# Patient Record
Sex: FEMALE | Race: OTHER | Employment: UNEMPLOYED | URBAN - METROPOLITAN AREA
[De-identification: names, ages, dates, MRNs, and addresses within clinical notes are randomized per-mention and may not be internally consistent; named-entity substitution may affect disease eponyms.]

---

## 2017-02-07 ENCOUNTER — OFFICE VISIT (OUTPATIENT)
Dept: FAMILY MEDICINE CLINIC | Age: 62
End: 2017-02-07

## 2017-02-07 VITALS
WEIGHT: 192.8 LBS | HEIGHT: 63 IN | OXYGEN SATURATION: 92 % | TEMPERATURE: 98.1 F | BODY MASS INDEX: 34.16 KG/M2 | HEART RATE: 95 BPM | RESPIRATION RATE: 18 BRPM | DIASTOLIC BLOOD PRESSURE: 74 MMHG | SYSTOLIC BLOOD PRESSURE: 107 MMHG

## 2017-02-07 DIAGNOSIS — E78.5 DYSLIPIDEMIA: ICD-10-CM

## 2017-02-07 DIAGNOSIS — R10.13 EPIGASTRIC ABDOMINAL PAIN: ICD-10-CM

## 2017-02-07 DIAGNOSIS — K59.09 OTHER CONSTIPATION: ICD-10-CM

## 2017-02-07 DIAGNOSIS — F33.9 MAJOR DEPRESSIVE DISORDER, RECURRENT EPISODE WITH ANXIOUS DISTRESS (HCC): ICD-10-CM

## 2017-02-07 DIAGNOSIS — E55.9 HYPOVITAMINOSIS D: ICD-10-CM

## 2017-02-07 DIAGNOSIS — E78.00 HYPERCHOLESTEROLEMIA: ICD-10-CM

## 2017-02-07 DIAGNOSIS — I10 HYPERTENSION, WELL CONTROLLED: ICD-10-CM

## 2017-02-07 DIAGNOSIS — E11.40 DIABETIC NEUROPATHY, PAINFUL (HCC): ICD-10-CM

## 2017-02-07 LAB
GLUCOSE POC: NORMAL MG/DL
HBA1C MFR BLD HPLC: 10.5 %

## 2017-02-07 RX ORDER — LANCETS
EACH MISCELLANEOUS
Qty: 1 EACH | Refills: 11 | Status: SHIPPED | OUTPATIENT
Start: 2017-02-07 | End: 2017-05-03 | Stop reason: SDUPTHER

## 2017-02-07 RX ORDER — PHENOL/SODIUM PHENOLATE
20 AEROSOL, SPRAY (ML) MUCOUS MEMBRANE DAILY
Qty: 30 TAB | Refills: 5 | Status: SHIPPED | OUTPATIENT
Start: 2017-02-07 | End: 2017-05-03 | Stop reason: SDUPTHER

## 2017-02-07 RX ORDER — LISINOPRIL 5 MG/1
5 TABLET ORAL DAILY
Qty: 30 TAB | Refills: 5 | Status: SHIPPED | OUTPATIENT
Start: 2017-02-07 | End: 2017-03-07 | Stop reason: SDUPTHER

## 2017-02-07 RX ORDER — INSULIN ASPART 100 [IU]/ML
INJECTION, SOLUTION INTRAVENOUS; SUBCUTANEOUS
Qty: 3 PEN | Refills: 10 | Status: SHIPPED | OUTPATIENT
Start: 2017-02-07 | End: 2017-05-03 | Stop reason: SDUPTHER

## 2017-02-07 RX ORDER — ERGOCALCIFEROL 1.25 MG/1
50000 CAPSULE ORAL
Qty: 12 CAP | Refills: 3 | Status: SHIPPED | OUTPATIENT
Start: 2017-02-07 | End: 2017-03-07 | Stop reason: SDUPTHER

## 2017-02-07 RX ORDER — METFORMIN HYDROCHLORIDE 1000 MG/1
1000 TABLET ORAL 2 TIMES DAILY WITH MEALS
Qty: 60 TAB | Refills: 7 | Status: SHIPPED | OUTPATIENT
Start: 2017-02-07 | End: 2017-05-03 | Stop reason: SDUPTHER

## 2017-02-07 RX ORDER — BUPROPION HYDROCHLORIDE 150 MG/1
150 TABLET ORAL
Qty: 30 TAB | Refills: 2 | Status: SHIPPED | OUTPATIENT
Start: 2017-02-07 | End: 2017-03-07 | Stop reason: SDUPTHER

## 2017-02-07 RX ORDER — POLYETHYLENE GLYCOL 3350 17 G/17G
17 POWDER, FOR SOLUTION ORAL
Qty: 30 PACKET | Refills: 5 | Status: SHIPPED | OUTPATIENT
Start: 2017-02-09 | End: 2017-12-24 | Stop reason: ALTCHOICE

## 2017-02-07 RX ORDER — GABAPENTIN 100 MG/1
100 CAPSULE ORAL 3 TIMES DAILY
Qty: 90 CAP | Refills: 3 | Status: SHIPPED | OUTPATIENT
Start: 2017-02-07 | End: 2017-03-07 | Stop reason: SDUPTHER

## 2017-02-07 RX ORDER — INSULIN LISPRO 100 [IU]/ML
INJECTION, SOLUTION INTRAVENOUS; SUBCUTANEOUS
Qty: 20 UNITS | Refills: 0
Start: 2017-02-07 | End: 2017-02-07 | Stop reason: ALTCHOICE

## 2017-02-07 NOTE — PATIENT INSTRUCTIONS
Type 2 Diabetes: Care Instructions  Your Care Instructions  Type 2 diabetes is a disease that develops when the body's tissues cannot use insulin properly. Over time, the pancreas cannot make enough insulin. Insulin is a hormone that helps the body's cells use sugar (glucose) for energy. It also helps the body store extra sugar in muscle, fat, and liver cells. Without insulin, the sugar cannot get into the cells to do its work. It stays in the blood instead. This can cause high blood sugar levels. A person has diabetes when the blood sugar stays too high too much of the time. Over time, diabetes can lead to diseases of the heart, blood vessels, nerves, kidneys, and eyes. You may be able to control your blood sugar by losing weight, eating a healthy diet, and getting daily exercise. You may also have to take insulin or other diabetes medicine. Follow-up care is a key part of your treatment and safety. Be sure to make and go to all appointments. Call your doctor if you are having problems. It's also a good idea to know your test results and keep a list of the medicines you take. How can you care for yourself at home? · Keep your blood sugar at a target level (which you set with your doctor). ¨ Eat a good diet that spreads carbohydrate throughout the day. Carbohydrate--the body's main source of fuel--affects blood sugar more than any other nutrient. Carbohydrate is in fruits, vegetables, milk, and yogurt. It also is in breads, cereals, vegetables such as potatoes and corn, and sugary foods such as candy and cakes. ¨ Aim for 30 minutes of exercise on most, preferably all, days of the week. Walking is a good choice. You also may want to do other activities, such as running, swimming, cycling, or playing tennis or team sports. If your doctor says it's okay, do muscle-strengthening exercises at least 2 times a week. ¨ Take your medicines exactly as prescribed.  Call your doctor if you think you are having a problem with your medicine. You will get more details on the specific medicines your doctor prescribes. · Check your blood sugar as often as your doctor recommends. It is important to keep track of any symptoms you have, such as low blood sugar. Also tell your doctor if you have any changes in your activities, diet, or insulin use. · Talk to your doctor before you start taking aspirin every day. Aspirin can help certain people lower their risk of a heart attack or stroke. But taking aspirin isn't right for everyone, because it can cause serious bleeding. · Do not smoke. If you need help quitting, talk to your doctor about stop-smoking programs and medicines. These can increase your chances of quitting for good. · Keep your cholesterol and blood pressure at normal levels. You may need to take one or more medicines to reach your goals. Take them exactly as directed. Do not stop or change a medicine without talking to your doctor first.  When should you call for help? Call 911 anytime you think you may need emergency care. For example, call if:  · You passed out (lost consciousness), or you suddenly become very sleepy or confused. (You may have very low blood sugar.)  Call your doctor now or seek immediate medical care if:  · Your blood sugar is 300 mg/dL or is higher than the level your doctor has set for you. · You have symptoms of low blood sugar, such as:  ¨ Sweating. ¨ Feeling nervous, shaky, and weak. ¨ Extreme hunger and slight nausea. ¨ Dizziness and headache. ¨ Blurred vision. ¨ Confusion. Watch closely for changes in your health, and be sure to contact your doctor if:  · You often have problems controlling your blood sugar. · You have symptoms of long-term diabetes problems, such as:  ¨ New vision changes. ¨ New pain, numbness, or tingling in your hands or feet. ¨ Skin problems. Where can you learn more? Go to http://alley-serg.info/.   Enter C553 in the search box to learn more about \"Type 2 Diabetes: Care Instructions. \"  Current as of: May 23, 2016  Content Version: 11.1  © 2235-1294 [employer]. Care instructions adapted under license by [employer] (which disclaims liability or warranty for this information). If you have questions about a medical condition or this instruction, always ask your healthcare professional. [address] any warranty or liability for your use of this information.

## 2017-02-07 NOTE — PROGRESS NOTES
1. Have you been to the ER, urgent care clinic since your last visit? Hospitalized since your last visit? Yes Where: elevated sugar, [facility]    2. Have you seen or consulted any other health care providers outside of the [address] [place] [address] since your last visit? Include any pap smears or colon screening. No  interperter states patient sugar is elevated and monitor is not reading sugar.

## 2017-02-07 NOTE — MR AVS SNAPSHOT
Visit Information Date & Time Provider Department Dept. Phone Encounter #  
 2/7/2017  3:30 PM Ricci Robertson MD [facility] at [address] [id number] Follow-up Instructions Return 2 weeks, for f/u results. Your Appointments 3/2/2017 10:40 AM  
Any with Marcos Alvarado MD  
[address] [place] ([facility]) Appt Note: f/up to discuss treatment options, last seen 2015  
 [address] [facility], [address] [address] [address] [place] [address], [address] [address] Upcoming Health Maintenance Date Due Pneumococcal 19-64 Medium Risk (1 of 1 - PPSV23) 7/1/1974 DTaP/Tdap/Td series (1 - Tdap) 7/1/1976 FOOT EXAM Q1 6/9/2016 MICROALBUMIN Q1 6/9/2016 EYE EXAM RETINAL OR DILATED Q1 6/9/2016 FOBT Q 1 YEAR AGE 50-75 6/9/2016 HEMOGLOBIN A1C Q6M 2/10/2017 BREAST CANCER SCRN MAMMOGRAM 5/6/2017 LIPID PANEL Q1 3/8/2017 PAP AKA CERVICAL CYTOLOGY 6/9/2018 Allergies as of 2/7/2017  Review Complete On: 2/7/2017 By: Ricci Robertson MD  
 No Known Allergies Current Immunizations  Never Reviewed No immunizations on file. Not reviewed this visit You Were Diagnosed With   
  
 Codes Comments DM type 2, uncontrolled, with neuropathy ([facility] [address]    -  Primary ICD-10-CM: E11.40, E11.65 ICD-9-CM: 250.62, 357.2 Other constipation     ICD-10-CM: K59.09 
ICD-9-CM: 564.09 Hypertension, well controlled     ICD-10-CM: I10 
ICD-9-CM: 401.9 Major depressive disorder, recurrent episode with anxious distress ([address]     ICD-10-CM: F33.9 ICD-9-CM: 296.30 Hypovitaminosis D     ICD-10-CM: E55.9 ICD-9-CM: 268.9 Epigastric abdominal pain     ICD-10-CM: R10.13 ICD-9-CM: 789.06 Diabetic neuropathy, painful ([address]     ICD-10-CM: E11.40 ICD-9-CM: 250.60, 357.2 Dyslipidemia     ICD-10-CM: E78.5 ICD-9-CM: 272.4 Hypercholesterolemia     ICD-10-CM: E78.00 ICD-9-CM: 272.0 Vitals BP Pulse Temp Resp Height(growth percentile) Weight(growth percentile) 107/74 (BP 1 Location: Left arm, BP Patient Position: Sitting) 95 98.1 °F (36.7 °C) (Oral) 18 5' 3\" (1.6 m) 192 lb 12.8 oz (87.5 kg) LMP SpO2 BMI OB Status Smoking Status (LMP Unknown) 92% 34.15 kg/m2 Hysterectomy Current Every Day Smoker Vitals History BMI and BSA Data Body Mass Index Body Surface Area  
 34.15 kg/m 2 1.97 m 2 Preferred Pharmacy Pharmacy Name Phone [id number], [address] [phone] Your Updated Medication List  
  
   
This list is accurate as of: 2/7/17  4:38 PM.  Always use your most recent med list.  
  
  
  
  
 Blood-Glucose Meter monitoring kit Commonly known as:  Freestyle Flash System Use as directed buPROPion  mg tablet Commonly known as:  Diego Medrano Take 1 Tab by mouth every morning. Indications: ANXIETY WITH DEPRESSION  
  
 ergocalciferol 50,000 unit capsule Commonly known as:  ERGOCALCIFEROL Take 1 Cap by mouth every seven (7) days. gabapentin 100 mg capsule Commonly known as:  NEURONTIN Take 1 Cap by mouth three (3) times daily. glucose blood VI test strips strip Commonly known as:  FREESTYLE INSULINX Check sugar 3 times  
  
 insulin aspart 100 unit/mL Inpn Commonly known as:  Andrey Ahr 40 units in am, 15 units in evening with meal  Indications: type 2 diabetes mellitus Lancets Misc Check sugar 2 times a day  
  
 lisinopril 5 mg tablet Commonly known as:  Nonah Henrietta Take 1 Tab by mouth daily. Indications: DIABETIC NEPHROPATHY, hypertension  
  
 metFORMIN 1,000 mg tablet Commonly known as:  GLUCOPHAGE Take 1 Tab by mouth two (2) times daily (with meals). Indications: type 2 diabetes mellitus Omeprazole delayed release 20 mg tablet Commonly known as:  PRILOSEC D/R Take 1 Tab by mouth daily. Indications: GASTROESOPHAGEAL REFLUX polyethylene glycol 17 gram packet Commonly known as:  Bolivar Novak Take 1 Packet by mouth every Monday, Thursday, Saturday. Start taking on:  2017 Prescriptions Sent to Pharmacy Refills  
 polyethylene glycol (MIRALAX) 17 gram packet 5 Starting on: 2017 Sig: Take 1 Packet by mouth every Monday, Thursday, Saturday. Class: Normal  
 Pharmacy: [facility]/pharmacy #[id number] [address] [address] Ph #: [phone] Route: Oral  
 lisinopril (PRINIVIL, ZESTRIL) 5 mg tablet 5 Sig: Take 1 Tab by mouth daily. Indications: DIABETIC NEPHROPATHY, hypertension Class: Normal  
 Pharmacy: [facility]/pharmacy #[id number]- [place] [address] [address] Ph #: [phone] Route: Oral  
 glucose blood VI test strips (FREESTYLE INSULINX) strip 11 Sig: Check sugar 3 times Class: Normal  
 Pharmacy: [facility]/pharmacy #[id number] [address] [address] Ph #: [phone] Lancets misc 11 Sig: Check sugar 2 times a day Class: Normal  
 Pharmacy: [facility]/pharmacy #[id number] [address] [address] Ph #: [phone]  
 buPROPion XL (WELLBUTRIN XL) 150 mg tablet 2 Sig: Take 1 Tab by mouth every morning. Indications: ANXIETY WITH DEPRESSION Class: Normal  
 Pharmacy: [facility]/pharmacy #[id number] [address] [address] Ph #: [phone] Route: Oral  
 insulin aspart (NOVOLOG FLEXPEN) 100 unit/mL inpn 10 Si units in am, 15 units in evening with meal  Indications: type 2 diabetes mellitus  Class: Normal  
 Pharmacy: [facility]/pharmacy #[id number]- [place] [address] [address] Ph #: [phone]  
 ergocalciferol (ERGOCALCIFEROL) 50,000 unit capsule 3  
 Sig: Take 1 Cap by mouth every seven (7) days. Class: Normal  
 Pharmacy: [facility]/pharmacy #[id number] [address] [address] Ph #: [phone] Route: Oral  
 Omeprazole delayed release (PRILOSEC D/R) 20 mg tablet 5 Sig: Take 1 Tab by mouth daily. Indications: GASTROESOPHAGEAL REFLUX Class: Normal  
 Pharmacy: [facility]/pharmacy #[id number] [address] [address] Ph #: [phone] Route: Oral  
 gabapentin (NEURONTIN) 100 mg capsule 3 Sig: Take 1 Cap by mouth three (3) times daily. Class: Normal  
 Pharmacy: [facility]/pharmacy #[id number] [address] [address] #: [phone] Route: Oral  
 metFORMIN (GLUCOPHAGE) 1,000 mg tablet 7 Sig: Take 1 Tab by mouth two (2) times daily (with meals). Indications: type 2 diabetes mellitus Class: Normal  
 Pharmacy: [facility]/pharmacy #[id number] [address] [address] #: [phone] Route: Oral  
  
We Performed the Following AMB POC GLUCOSE BLOOD, BY GLUCOSE MONITORING DEVICE [93613 CPT(R)] AMB POC HEMOGLOBIN A1C [21150 CPT(R)] LIPID PANEL [55663 CPT(R)] METABOLIC PANEL, COMPREHENSIVE [60204 CPT(R)] MICROALBUMIN, UR, RAND W/ MICROALBUMIN/CREA RATIO [id number] CPT(R)] Follow-up Instructions Return 2 weeks, for f/u results. Patient Instructions Type 2 Diabetes: Care Instructions Your Care Instructions Type 2 diabetes is a disease that develops when the body's tissues cannot use insulin properly. Over time, the pancreas cannot make enough insulin. Insulin is a hormone that helps the body's cells use sugar (glucose) for energy. It also helps the body store extra sugar in muscle, fat, and liver cells. Without insulin, the sugar cannot get into the cells to do its work. It stays in the blood instead. This can cause high blood sugar levels.  A person has diabetes when the blood sugar stays too high too much of the time. Over time, diabetes can lead to diseases of the heart, blood vessels, nerves, kidneys, and eyes. You may be able to control your blood sugar by losing weight, eating a healthy diet, and getting daily exercise. You may also have to take insulin or other diabetes medicine. Follow-up care is a key part of your treatment and safety. Be sure to make and go to all appointments. Call your doctor if you are having problems. It's also a good idea to know your test results and keep a list of the medicines you take. How can you care for yourself at home? · Keep your blood sugar at a target level (which you set with your doctor). ¨ Eat a good diet that spreads carbohydrate throughout the day. Carbohydratethe body's main source of fuelaffects blood sugar more than any other nutrient. Carbohydrate is in fruits, vegetables, milk, and yogurt. It also is in breads, cereals, vegetables such as potatoes and corn, and sugary foods such as candy and cakes. ¨ Aim for 30 minutes of exercise on most, preferably all, days of the week. Walking is a good choice. You also may want to do other activities, such as running, swimming, cycling, or playing tennis or team sports. If your doctor says it's okay, do muscle-strengthening exercises at least 2 times a week. ¨ Take your medicines exactly as prescribed. Call your doctor if you think you are having a problem with your medicine. You will get more details on the specific medicines your doctor prescribes. · Check your blood sugar as often as your doctor recommends. It is important to keep track of any symptoms you have, such as low blood sugar. Also tell your doctor if you have any changes in your activities, diet, or insulin use. · Talk to your doctor before you start taking aspirin every day. Aspirin can help certain people lower their risk of a heart attack or stroke.  But taking aspirin isn't right for everyone, because it can cause serious bleeding. · Do not smoke. If you need help quitting, talk to your doctor about stop-smoking programs and medicines. These can increase your chances of quitting for good. · Keep your cholesterol and blood pressure at normal levels. You may need to take one or more medicines to reach your goals. Take them exactly as directed. Do not stop or change a medicine without talking to your doctor first. 
When should you call for help? Call 911 anytime you think you may need emergency care. For example, call if: 
· You passed out (lost consciousness), or you suddenly become very sleepy or confused. (You may have very low blood sugar.) Call your doctor now or seek immediate medical care if: 
· Your blood sugar is 300 mg/dL or is higher than the level your doctor has set for you. · You have symptoms of low blood sugar, such as: ¨ Sweating. ¨ Feeling nervous, shaky, and weak. ¨ Extreme hunger and slight nausea. ¨ Dizziness and headache. ¨ Blurred vision. ¨ Confusion. Watch closely for changes in your health, and be sure to contact your doctor if: 
· You often have problems controlling your blood sugar. · You have symptoms of long-term diabetes problems, such as: ¨ New vision changes. ¨ New pain, numbness, or tingling in your hands or feet. ¨ Skin problems. Where can you learn more? Go to http://alley-serg.info/. Enter C553 in the search box to learn more about \"Type 2 Diabetes: Care Instructions. \" Current as of: May 23, 2016 Content Version: 11.1 © 2388-9943 [employer]. Care instructions adapted under license by [employer] (which disclaims liability or warranty for this information). If you have questions about a medical condition or this instruction, always ask your healthcare professional. [address] any warranty or liability for your use of this information. Introducing [facility]! Angelina Cummings introduces [employer] patient portal. Now you can access parts of your medical record, email your doctor's office, and request medication refills online. 1. In your internet browser, go to [email or web address] 2. Click on the First Time User? Click Here link in the Sign In box. You will see the New Member Sign Up page. 3. Enter your [employer] Access Code exactly as it appears below. You will not need to use this code after youve completed the sign-up process. If you do not sign up before the expiration date, you must request a new code. · [employer] Access Code: [email or web address] Expires: 5/8/2017  4:38 PM 
 
4. Enter the last four digits of your Social Security Number (xxxx) and Date of Birth (mm/dd/yyyy) as indicated and click Submit. You will be taken to the next sign-up page. 5. Create a Femasys ID. This will be your [employer] login ID and cannot be changed, so think of one that is secure and easy to remember. 6. Create a [employer] password. You can change your password at any time. 7. Enter your Password Reset Question and Answer. This can be used at a later time if you forget your password. 8. Enter your e-mail address. You will receive e-mail notification when new information is available in [address] 9. Click Sign Up. You can now view and download portions of your medical record. 10. Click the Download Summary menu link to download a portable copy of your medical information. If you have questions, please visit the Frequently Asked Questions section of the [employer] website. Remember, [employer] is NOT to be used for urgent needs. For medical emergencies, dial 911. Now available from your iPhone and Android! Please provide this summary of care documentation to your next provider. Your primary care clinician is listed as Juany Stark. If you have any questions after today's visit, please call [phone].

## 2017-02-07 NOTE — PROGRESS NOTES
Chief Complaint   Patient presents with    Diabetes    Medication Refill     HPI:  Maggy Campos is a 64 y.o. AA female presenting for diabetes and hypertension presenting for medication r.efill  Diabetes is always not controlled, A1c=10%, blood sugar reads high. She awake, alert and asymptomatic. Patient has not taken insulin for few weeks    Review of Systems  As per hpi  Past Medical History   Diagnosis Date    Arthritis      shoulders and knees.  Diabetes ([address]      insulin dependent     Hypercholesterolemia     Hypertension      Past Surgical History   Procedure Laterality Date    Hx gyn       hysterectomy     Social History    Marital status:      Spouse name: N/A    Number of children: N/A    Years of education: N/A     Social History Main Topics    Smoking status: Current Every Day Smoker     Packs/day: 4.00     Years: 3.00    Smokeless tobacco: Never Used    Alcohol use No    Drug use: No    Sexual activity: Not Asked     Current Outpatient Prescriptions   Medication Sig Dispense Refill    buPROPion XL (WELLBUTRIN XL) 150 mg tablet Take 1 Tab by mouth every morning. Indications: ANXIETY WITH DEPRESSION 30 Tab 2    ergocalciferol (ERGOCALCIFEROL) 50,000 unit capsule Take 1 Cap by mouth every seven (7) days. 12 Cap 3    insulin aspart (NOVOLOG FLEXPEN) 100 unit/mL inpn 30 units in am, 10 units in evening with meal  Indications: TYPE 2 DIABETES MELLITUS 3 Pen 10    mupirocin (BACTROBAN) 2 % ointment Apply  to affected area daily. 22 g 0    insulin lispro (HUMALOG) 100 unit/mL injection 20 units now 20 Units 0    Omeprazole delayed release (PRILOSEC D/R) 20 mg tablet Take 1 Tab by mouth daily. Indications: GASTROESOPHAGEAL REFLUX 30 Tab 5    gabapentin (NEURONTIN) 100 mg capsule Take 1 Cap by mouth three (3) times daily.  90 Cap 3    Lancets (FREESTYLE LANCETS) misc Check sugar 2 times a day 1 Each 11    glucose blood VI test strips (FREESTYLE INSULINX) strip Check sugar 3 times 90 Strip 11    insulin aspart protamine/insulin aspart (NOVOLOG MIX 70/30) 100 unit/mL (70-30) inpn by SubCUTAneous route.  metFORMIN (GLUCOPHAGE) 1,000 mg tablet Take 1 Tab by mouth two (2) times daily (with meals). Indications: TYPE 2 DIABETES MELLITUS 60 Tab 7    polyethylene glycol (MIRALAX) 17 gram packet Take 1 Packet by mouth every Monday, Thursday, Saturday. 30 Packet 5    lisinopril (PRINIVIL, ZESTRIL) 5 mg tablet Take 1 Tab by mouth daily.  Indications: DIABETIC NEPHROPATHY, HYPERTENSION 30 Tab 5    Blood-Glucose Meter (VidimaxSTYLE FLASH SYSTEM) monitoring kit Use as directed 1 Kit 0     No Known Allergies    Objective:  Visit Vitals    /74 (BP 1 Location: Left arm, BP Patient Position: Sitting)    Pulse 95    Temp 98.1 °F (36.7 °C) (Oral)    Resp 18    Ht 5' 3\" (1.6 m)    Wt 192 lb 12.8 oz (87.5 kg)    LMP  (LMP Unknown)    SpO2 92%    BMI 34.15 kg/m2     Physical Exam:   General appearance - alert, well appearing, in no distress  Mental status - alert, oriented to person, place, and time  EYE-PERRL, EOMI  Heart - normal rate, regular rhythm, stable blood pressure  Abdomen - soft, nontender, nondistended, no organomegaly  Ext-peripheral pulses normal, no pedal edema, no clubbing or cyanosis  Neuro -alert, oriented, normal speech, no focal findings    Results for orders placed or performed in visit on 02/07/17   AMB POC GLUCOSE BLOOD, BY GLUCOSE MONITORING DEVICE   Result Value Ref Range    Glucose POC HHH mg/dL   AMB POC HEMOGLOBIN A1C   Result Value Ref Range    Hemoglobin A1c (POC) 10.5 %     Assessment/Plan:    ICD-10-CM ICD-9-CM    1. DM type 2, uncontrolled, with neuropathy (HCC) E11.40 250.62 AMB POC GLUCOSE BLOOD, BY GLUCOSE MONITORING DEVICE    E11.65 357.2 AMB POC HEMOGLOBIN A1C      Lancets misc      metFORMIN (GLUCOPHAGE) 1,000 mg tablet      METABOLIC PANEL, COMPREHENSIVE      MICROALBUMIN, UR, RAND W/ MICROALBUMIN/CREA RATIO      REFERRAL TO ENDOCRINOLOGY   2. Other constipation K59.09 564.09 polyethylene glycol (MIRALAX) 17 gram packet   3. Hypertension, well controlled I10 401.9 lisinopril (PRINIVIL, ZESTRIL) 5 mg tablet      METABOLIC PANEL, COMPREHENSIVE   4. Major depressive disorder, recurrent episode with anxious distress (HCC) F33.9 296.30 buPROPion XL (WELLBUTRIN XL) 150 mg tablet   5. Hypovitaminosis D E55.9 268.9 ergocalciferol (ERGOCALCIFEROL) 50,000 unit capsule   6. Epigastric abdominal pain R10.13 789.06 Omeprazole delayed release (PRILOSEC D/R) 20 mg tablet   7. Diabetic neuropathy, painful (HCC) E11.40 250.60 gabapentin (NEURONTIN) 100 mg capsule     357.2    8. Dyslipidemia E78.5 272.4    9. Hypercholesterolemia E78.00 272.0 LIPID PANEL     Patient Instructions        Type 2 Diabetes: Care Instructions  Your Care Instructions  Type 2 diabetes is a disease that develops when the body's tissues cannot use insulin properly. Over time, the pancreas cannot make enough insulin. Insulin is a hormone that helps the body's cells use sugar (glucose) for energy. It also helps the body store extra sugar in muscle, fat, and liver cells. Without insulin, the sugar cannot get into the cells to do its work. It stays in the blood instead. This can cause high blood sugar levels. A person has diabetes when the blood sugar stays too high too much of the time. Over time, diabetes can lead to diseases of the heart, blood vessels, nerves, kidneys, and eyes. You may be able to control your blood sugar by losing weight, eating a healthy diet, and getting daily exercise. You may also have to take insulin or other diabetes medicine. Follow-up care is a key part of your treatment and safety. Be sure to make and go to all appointments. Call your doctor if you are having problems. It's also a good idea to know your test results and keep a list of the medicines you take. How can you care for yourself at home?   · Keep your blood sugar at a target level (which you set with your doctor). ¨ Eat a good diet that spreads carbohydrate throughout the day. Carbohydrate--the body's main source of fuel--affects blood sugar more than any other nutrient. Carbohydrate is in fruits, vegetables, milk, and yogurt. It also is in breads, cereals, vegetables such as potatoes and corn, and sugary foods such as candy and cakes. ¨ Aim for 30 minutes of exercise on most, preferably all, days of the week. Walking is a good choice. You also may want to do other activities, such as running, swimming, cycling, or playing tennis or team sports. If your doctor says it's okay, do muscle-strengthening exercises at least 2 times a week. ¨ Take your medicines exactly as prescribed. Call your doctor if you think you are having a problem with your medicine. You will get more details on the specific medicines your doctor prescribes. · Check your blood sugar as often as your doctor recommends. It is important to keep track of any symptoms you have, such as low blood sugar. Also tell your doctor if you have any changes in your activities, diet, or insulin use. · Talk to your doctor before you start taking aspirin every day. Aspirin can help certain people lower their risk of a heart attack or stroke. But taking aspirin isn't right for everyone, because it can cause serious bleeding. · Do not smoke. If you need help quitting, talk to your doctor about stop-smoking programs and medicines. These can increase your chances of quitting for good. · Keep your cholesterol and blood pressure at normal levels. You may need to take one or more medicines to reach your goals. Take them exactly as directed. Do not stop or change a medicine without talking to your doctor first.  When should you call for help? Call 911 anytime you think you may need emergency care. For example, call if:  · You passed out (lost consciousness), or you suddenly become very sleepy or confused.  (You may have very low blood sugar.)  Call your doctor now or seek immediate medical care if:  · Your blood sugar is 300 mg/dL or is higher than the level your doctor has set for you. · You have symptoms of low blood sugar, such as:  ¨ Sweating. ¨ Feeling nervous, shaky, and weak. ¨ Extreme hunger and slight nausea. ¨ Dizziness and headache. ¨ Blurred vision. ¨ Confusion. Watch closely for changes in your health, and be sure to contact your doctor if:  · You often have problems controlling your blood sugar. · You have symptoms of long-term diabetes problems, such as:  ¨ New vision changes. ¨ New pain, numbness, or tingling in your hands or feet. ¨ Skin problems. Where can you learn more? Go to http://alley-serg.info/. Enter C553 in the search box to learn more about \"Type 2 Diabetes: Care Instructions. \"  Current as of: May 23, 2016  Content Version: 11.1  © 6935-8590 [employer]. Care instructions adapted under license by [employer] (which disclaims liability or warranty for this information). If you have questions about a medical condition or this instruction, always ask your healthcare professional. [place] [address] any warranty or liability for your use of this information. Follow-up Disposition:  Return 2 weeks, for f/u results.

## 2017-02-16 LAB
ALBUMIN SERPL-MCNC: 4.1 G/DL (ref 3.6–4.8)
ALBUMIN/CREAT UR: 4.8 MG/G CREAT (ref 0–30)
ALBUMIN/GLOB SERPL: 1.5 {RATIO} (ref 1.1–2.5)
ALP SERPL-CCNC: 80 IU/L (ref 39–117)
ALT SERPL-CCNC: 16 IU/L (ref 0–32)
AST SERPL-CCNC: 15 IU/L (ref 0–40)
BILIRUB SERPL-MCNC: 0.5 MG/DL (ref 0–1.2)
BUN SERPL-MCNC: 15 MG/DL (ref 8–27)
BUN/CREAT SERPL: 20 (ref 11–26)
CALCIUM SERPL-MCNC: 9.5 MG/DL (ref 8.7–10.3)
CHLORIDE SERPL-SCNC: 96 MMOL/L (ref 96–106)
CHOLEST SERPL-MCNC: 220 MG/DL (ref 100–199)
CO2 SERPL-SCNC: 26 MMOL/L (ref 18–29)
CREAT SERPL-MCNC: 0.74 MG/DL (ref 0.57–1)
CREAT UR-MCNC: 135.8 MG/DL
GLOBULIN SER CALC-MCNC: 2.8 G/DL (ref 1.5–4.5)
GLUCOSE SERPL-MCNC: 347 MG/DL (ref 65–99)
HDLC SERPL-MCNC: 45 MG/DL
LDLC SERPL CALC-MCNC: 123 MG/DL (ref 0–99)
MICROALBUMIN UR-MCNC: 6.5 UG/ML
POTASSIUM SERPL-SCNC: 5 MMOL/L (ref 3.5–5.2)
PROT SERPL-MCNC: 6.9 G/DL (ref 6–8.5)
SODIUM SERPL-SCNC: 137 MMOL/L (ref 134–144)
TRIGL SERPL-MCNC: 258 MG/DL (ref 0–149)
VLDLC SERPL CALC-MCNC: 52 MG/DL (ref 5–40)

## 2017-02-20 ENCOUNTER — OFFICE VISIT (OUTPATIENT)
Dept: FAMILY MEDICINE CLINIC | Age: 62
End: 2017-02-20

## 2017-02-20 VITALS
HEART RATE: 100 BPM | SYSTOLIC BLOOD PRESSURE: 103 MMHG | RESPIRATION RATE: 20 BRPM | DIASTOLIC BLOOD PRESSURE: 64 MMHG | WEIGHT: 193 LBS | TEMPERATURE: 97.4 F | HEIGHT: 63 IN | BODY MASS INDEX: 34.2 KG/M2 | OXYGEN SATURATION: 97 %

## 2017-02-20 DIAGNOSIS — E11.00 UNCONTROLLED TYPE 2 DIABETES MELLITUS WITH HYPEROSMOLARITY WITHOUT COMA, UNSPECIFIED LONG TERM INSULIN USE STATUS: ICD-10-CM

## 2017-02-20 DIAGNOSIS — E78.00 HYPERCHOLESTEROLEMIA: ICD-10-CM

## 2017-02-20 DIAGNOSIS — E66.9 OBESITY (BMI 30-39.9): ICD-10-CM

## 2017-02-20 DIAGNOSIS — E55.9 HYPOVITAMINOSIS D: Primary | ICD-10-CM

## 2017-02-20 NOTE — MR AVS SNAPSHOT
Visit Information Date & Time Provider Department Dept. Phone Encounter #  
 2/20/2017  3:00 PM Nanci Celestin MD [facility] at [address] [id number] Follow-up Instructions Return in about 4 weeks (around 3/20/2017), or if symptoms worsen or fail to improve. Your Appointments 3/2/2017 10:40 AM  
Any with Lola Jeronimo MD  
[address] [place] [address] ([facility]) Appt Note: f/up to discuss treatment options, last seen 2015  
 [address] [facility] [address] [phone] [address] [place] [address]., [address] [phone]  
  
    
 3/21/2017  3:20 PM  
New Patient with Venancio Beach MD  
[facility] OFF[facility] ([facility]) Appt Note: new pt; manage diabetes [address] [place] [address] 7 [phone] [place] [phone] Upcoming Health Maintenance Date Due Pneumococcal 19-64 Medium Risk (1 of 1 - PPSV23) 7/1/1974 DTaP/Tdap/Td series (1 - Tdap) 7/1/1976 FOOT EXAM Q1 6/9/2016 EYE EXAM RETINAL OR DILATED Q1 6/9/2016 FOBT Q 1 YEAR AGE 50-75 6/9/2016 BREAST CANCER SCRN MAMMOGRAM 5/6/2017 HEMOGLOBIN A1C Q6M 8/7/2017 MICROALBUMIN Q1 2/15/2018 LIPID PANEL Q1 2/15/2018 PAP AKA CERVICAL CYTOLOGY 6/9/2018 Allergies as of 2/20/2017  Review Complete On: 2/20/2017 By: Nanci Celestin MD  
 No Known Allergies Current Immunizations  Never Reviewed No immunizations on file. Not reviewed this visit You Were Diagnosed With   
  
 Codes Comments Hypovitaminosis D    -  Primary ICD-10-CM: E55.9 ICD-9-CM: 268.9 Hypercholesterolemia     ICD-10-CM: E78.00 ICD-9-CM: 272.0 Uncontrolled type 2 diabetes mellitus with hyperosmolarity without coma, unspecified long term insulin use status (HCC)     ICD-10-CM: E11.00 ICD-9-CM: 250.22   
 Obesity (BMI 30-39. 9)     ICD-10-CM: E66.9 ICD-9-CM: 278.00 Vitals BP Pulse Temp Resp Height(growth percentile) Weight(growth percentile) 103/64 (BP 1 Location: Right arm, BP Patient Position: Sitting) 100 97.4 °F (36.3 °C) (Oral) 20 5' 3\" (1.6 m) 193 lb (87.5 kg) LMP SpO2 BMI OB Status Smoking Status (LMP Unknown) 97% 34.19 kg/m2 Hysterectomy Current Every Day Smoker Vitals History BMI and BSA Data Body Mass Index Body Surface Area  
 34.19 kg/m 2 1.97 m 2 Preferred Pharmacy Pharmacy Name Phone [id number] [id number], [phone] [phone] Your Updated Medication List  
  
   
This list is accurate as of: 2/20/17  4:32 PM.  Always use your most recent med list.  
  
  
  
  
 Blood-Glucose Meter monitoring kit Commonly known as:  Freestyle Flash System Use as directed buPROPion  mg tablet Commonly known as:  Oanh Bannister Take 1 Tab by mouth every morning. Indications: ANXIETY WITH DEPRESSION  
  
 ergocalciferol 50,000 unit capsule Commonly known as:  ERGOCALCIFEROL Take 1 Cap by mouth every seven (7) days. gabapentin 100 mg capsule Commonly known as:  NEURONTIN Take 1 Cap by mouth three (3) times daily. glucose blood VI test strips strip Commonly known as:  FREESTYLE INSULINX Check sugar 3 times  
  
 insulin aspart 100 unit/mL Inpn Commonly known as:  Julee Barnacle 40 units in am, 15 units in evening with meal  Indications: type 2 diabetes mellitus Lancets Misc Check sugar 2 times a day  
  
 lisinopril 5 mg tablet Commonly known as:  Kenneth Pares Take 1 Tab by mouth daily. Indications: DIABETIC NEPHROPATHY, hypertension  
  
 metFORMIN 1,000 mg tablet Commonly known as:  GLUCOPHAGE Take 1 Tab by mouth two (2) times daily (with meals). Indications: type 2 diabetes mellitus Omeprazole delayed release 20 mg tablet Commonly known as:  PRILOSEC D/R Take 1 Tab by mouth daily. Indications: GASTROESOPHAGEAL REFLUX polyethylene glycol 17 gram packet Commonly known as:  Johnathan John Take 1 Packet by mouth every Monday, Thursday, Saturday. We Performed the Following AMB POC GLUCOSE BLOOD, BY GLUCOSE MONITORING DEVICE [67475 CPT(R)] Follow-up Instructions Return in about 4 weeks (around 3/20/2017), or if symptoms worsen or fail to improve. Patient Instructions Advised to go to ER for evaluation and some fluid, Sugar is ruperto high Introducing \[facility]\"" & HEALTH SERVICES! [facility] introduces [employer] patient portal. Now you can access parts of your medical record, email your doctor's office, and request medication refills online. 1. In your internet browser, go to [email or web address] 2. Click on the First Time User? Click Here link in the Sign In box. You will see the New Member Sign Up page. 3. Enter your Priceline Access Code exactly as it appears below. You will not need to use this code after youve completed the sign-up process. If you do not sign up before the expiration date, you must request a new code. · Priceline Access Code: [email or web address] Expires: 5/8/2017  4:38 PM 
 
4. Enter the last four digits of your Social Security Number (xxxx) and Date of Birth (mm/dd/yyyy) as indicated and click Submit. You will be taken to the next sign-up page. 5. Create a [employer] ID. This will be your Priceline login ID and cannot be changed, so think of one that is secure and easy to remember. 6. Create a Priceline password. You can change your password at any time. 7. Enter your Password Reset Question and Answer. This can be used at a later time if you forget your password. 8. Enter your e-mail address. You will receive e-mail notification when new information is available in [address] 9. Click Sign Up. You can now view and download portions of your medical record. 10. Click the Download Summary menu link to download a portable copy of your medical information. If you have questions, please visit the Frequently Asked Questions section of the [employer] website. Remember, [employer] is NOT to be used for urgent needs. For medical emergencies, dial 911. Now available from your iPhone and Android! Please provide this summary of care documentation to your next provider. Your primary care clinician is listed as Esme Norman. If you have any questions after today's visit, please call [phone].

## 2017-02-20 NOTE — PROGRESS NOTES
1. Have you been to the ER, urgent care clinic since your last visit? Hospitalized since your last visit? No    2. Have you seen or consulted any other health care providers outside of the [address] [place] [address] since your last visit? Include any pap smears or colon screening. No     Pt is here for lab results.

## 2017-02-20 NOTE — PROGRESS NOTES
Chief Complaint   Patient presents with    Results     HPI:  Rubens Cortez is a 64 y.o. female presenting to follow up diabetes. Current medications include Nolog 40 unit AM and 15 units in PM; metformin 1g 2 times aday. Patient is non compliant with treatment, A1C=10, BS clinic today reads [address] About 4 weeks ago the reading was also [address]. Patient is alert and awake. Advised to go to ER for some IV fluid and insulin    Review of Systems  As per    Past Medical History   Diagnosis Date    Arthritis      shoulders and knees.  Diabetes ([address]      insulin dependent     Hypercholesterolemia     Hypertension      Past Surgical History   Procedure Laterality Date    Hx gyn       hysterectomy     Social History     Social History    Marital status:      Spouse name: N/A    Number of children: N/A    Years of education: N/A     Social History Main Topics    Smoking status: Current Every Day Smoker     Packs/day: 4.00     Years: 3.00    Smokeless tobacco: Never Used    Alcohol use No    Drug use: No    Sexual activity: Not Asked     Current Outpatient Prescriptions   Medication Sig Dispense Refill    polyethylene glycol (MIRALAX) 17 gram packet Take 1 Packet by mouth every Monday, Thursday, Saturday. 30 Packet 5    lisinopril (PRINIVIL, ZESTRIL) 5 mg tablet Take 1 Tab by mouth daily. Indications: DIABETIC NEPHROPATHY, hypertension 30 Tab 5    glucose blood VI test strips (FREESTYLE INSULINX) strip Check sugar 3 times 90 Strip 11    Lancets misc Check sugar 2 times a day 1 Each 11    buPROPion XL (WELLBUTRIN XL) 150 mg tablet Take 1 Tab by mouth every morning. Indications: ANXIETY WITH DEPRESSION 30 Tab 2    insulin aspart (NOVOLOG FLEXPEN) 100 unit/mL inpn 40 units in am, 15 units in evening with meal  Indications: type 2 diabetes mellitus 3 Pen 10    ergocalciferol (ERGOCALCIFEROL) 50,000 unit capsule Take 1 Cap by mouth every seven (7) days.  12 Cap 3    Omeprazole delayed release (PRILOSEC D/R) 20 mg tablet Take 1 Tab by mouth daily. Indications: GASTROESOPHAGEAL REFLUX 30 Tab 5    gabapentin (NEURONTIN) 100 mg capsule Take 1 Cap by mouth three (3) times daily. 90 Cap 3    metFORMIN (GLUCOPHAGE) 1,000 mg tablet Take 1 Tab by mouth two (2) times daily (with meals). Indications: type 2 diabetes mellitus 60 Tab 7    Blood-Glucose Meter (FREESTYLE FLASH SYSTEM) monitoring kit Use as directed 1 Kit 0     No Known Allergies    Objective:  Visit Vitals    /64 (BP 1 Location: Right arm, BP Patient Position: Sitting)    Pulse 100    Temp 97.4 °F (36.3 °C) (Oral)    Resp 20    Ht 5' 3\" (1.6 m)    Wt 193 lb (87.5 kg)    LMP  (LMP Unknown)    SpO2 97%    BMI 34.19 kg/m2     Physical Exam:   General appearance - alert, well appearing, in no distress  Mental status - alert, oriented to person, place, and time  EYE-PERRL, EOMI  Chest - clear to auscultation, no wheezes, rales or rhonchi  Heart - normal rate, regular rhythm, normal blood pressure  Abdomen - soft, nontender, nondistended, no organomegaly  Ext-peripheral pulses normal, no pedal edema  Neuro -alert, oriented, normal speech, no focal findings   Back-full range of motion, no tenderness, palpable spasm or pain on motion     Results for orders placed or performed in visit on 36/36/87   METABOLIC PANEL, COMPREHENSIVE   Result Value Ref Range    Glucose 347 (H) 65 - 99 mg/dL    BUN 15 8 - 27 mg/dL    Creatinine 0.74 0.57 - 1.00 mg/dL    GFR est non-AA 88 >59 mL/min/1.73    GFR est  >59 mL/min/1.73    BUN/Creatinine ratio 20 11 - 26    Sodium 137 134 - 144 mmol/L    Potassium 5.0 3.5 - 5.2 mmol/L    Chloride 96 96 - 106 mmol/L    CO2 26 18 - 29 mmol/L    Calcium 9.5 8.7 - 10.3 mg/dL    Protein, total 6.9 6.0 - 8.5 g/dL    Albumin 4.1 3.6 - 4.8 g/dL    GLOBULIN, TOTAL 2.8 1.5 - 4.5 g/dL    A-G Ratio 1.5 1.1 - 2.5    Bilirubin, total 0.5 0.0 - 1.2 mg/dL    Alk.  phosphatase 80 39 - 117 IU/L    AST (SGOT) 15 0 - 40 IU/L    ALT (SGPT) 16 0 - 32 IU/L   LIPID PANEL   Result Value Ref Range    Cholesterol, total 220 (H) 100 - 199 mg/dL    Triglyceride 258 (H) 0 - 149 mg/dL    HDL Cholesterol 45 >39 mg/dL    VLDL, calculated 52 (H) 5 - 40 mg/dL    LDL, calculated 123 (H) 0 - 99 mg/dL   MICROALBUMIN, UR, RAND W/ MICROALBUMIN/CREA RATIO   Result Value Ref Range    Creatinine, urine 135.8 Not Estab. mg/dL    Microalbumin, urine 6.5 Not Estab. ug/mL    Microalb/Creat ratio (ug/mg creat.) 4.8 0.0 - 30.0 mg/g creat   AMB POC GLUCOSE BLOOD, BY GLUCOSE MONITORING DEVICE   Result Value Ref Range    Glucose POC HHH mg/dL   AMB POC HEMOGLOBIN A1C   Result Value Ref Range    Hemoglobin A1c (POC) 10.5 %     Assessment/Plan:    ICD-10-CM ICD-9-CM    1. Hypovitaminosis D E55.9 268.9    2. Hypercholesterolemia E78.00 272.0    3. Uncontrolled type 2 diabetes mellitus with hyperosmolarity without coma, unspecified long term insulin use status (HCC) E11.00 250.22 AMB POC GLUCOSE BLOOD, BY GLUCOSE MONITORING DEVICE   4. Obesity (BMI 30-39. 9) E66.9 278.00        Patient Instructions   Advised to go to ER for evaluation and some fluid, Sugar is ruperto high    Follow-up Disposition:  Return in about 4 weeks (around 3/20/2017), or if symptoms worsen or fail to improve.

## 2017-03-02 ENCOUNTER — OFFICE VISIT (OUTPATIENT)
Dept: SLEEP MEDICINE | Age: 62
End: 2017-03-02

## 2017-03-02 VITALS
SYSTOLIC BLOOD PRESSURE: 108 MMHG | HEIGHT: 63 IN | DIASTOLIC BLOOD PRESSURE: 74 MMHG | HEART RATE: 77 BPM | BODY MASS INDEX: 34.38 KG/M2 | WEIGHT: 194 LBS | OXYGEN SATURATION: 98 %

## 2017-03-02 DIAGNOSIS — G47.33 OBSTRUCTIVE SLEEP APNEA (ADULT) (PEDIATRIC): Primary | ICD-10-CM

## 2017-03-02 NOTE — PATIENT INSTRUCTIONS
[address] [place]. [place], [address]  Tel.  [phone]  Fax. 100 [facility] 60  [place], [address] [facility]  Tel.  [phone]  Fax. [phone] [address] [address] Erik Patel  Tel.  [phone]  Fax. [phone]     Learning About CPAP for Sleep Apnea  What is CPAP? CPAP is a small machine that you use at home every night while you sleep. It increases air pressure in your throat to keep your airway open. When you have sleep apnea, this can help you sleep better so you feel much better. CPAP stands for \"continuous positive airway pressure. \"  The CPAP machine will have one of the following:  · A mask that covers your nose and mouth  · Prongs that fit into your nose  · A mask that covers your nose only, the most common type. This type is called NCPAP. The N stands for \"nasal.\"  Why is it done? CPAP is usually the best treatment for obstructive sleep apnea. It is the first treatment choice and the most widely used. Your doctor may suggest CPAP if you have:  · Moderate to severe sleep apnea. · Sleep apnea and coronary artery disease (CAD) or heart failure. How does it help? · CPAP can help you have more normal sleep, so you feel less sleepy and more alert during the daytime. · CPAP may help keep heart failure or other heart problems from getting worse. · NCPAP may help lower your blood pressure. · If you use CPAP, your bed partner may also sleep better because you are not snoring or restless. What are the side effects? Some people who use CPAP have:  · A dry or stuffy nose and a sore throat. · Irritated skin on the face. · Sore eyes. · Bloating. If you have any of these problems, work with your doctor to fix them. Here are some things you can try:  · Be sure the mask or nasal prongs fit well. · See if your doctor can adjust the pressure of your CPAP. · If your nose is dry, try a humidifier.   · If your nose is runny or stuffy, try decongestant medicine or a steroid nasal spray. If these things do not help, you might try a different type of machine. Some machines have air pressure that adjusts on its own. Others have air pressures that are different when you breathe in than when you breathe out. This may reduce discomfort caused by too much pressure in your nose. Where can you learn more? Go to [employer].be  Enter Abena Crain in the search box to learn more about \"Learning About CPAP for Sleep Apnea. \"   © 1610-5210 Healthwise, Incorporated. Care instructions adapted under license by [facility] [employer] (which disclaims liability or warranty for this information). This care instruction is for use with your licensed healthcare professional. If you have questions about a medical condition or this instruction, always ask your healthcare professional. [address] any warranty or liability for your use of this information. Content Version: 9.0.50939; Last Revised: January 11, 2010  PROPER SLEEP HYGIENE    What to avoid  · Do not have drinks with caffeine, such as coffee or black tea, for 8 hours before bed. · Do not smoke or use other types of tobacco near bedtime. Nicotine is a stimulant and can keep you awake. · Avoid drinking alcohol late in the evening, because it can cause you to wake in the middle of the night. · Do not eat a big meal close to bedtime. If you are hungry, eat a light snack. · Do not drink a lot of water close to bedtime, because the need to urinate may wake you up during the night. · Do not read or watch TV in bed. Use the bed only for sleeping and sexual activity. What to try  · Go to bed at the same time every night, and wake up at the same time every morning. Do not take naps during the day. · Keep your bedroom quiet, dark, and cool. · Get regular exercise, but not within 3 to 4 hours of your bedtime. .  · Sleep on a comfortable pillow and mattress.   · If watching the clock makes you anxious, turn it facing away from you so you cannot see the time. · If you worry when you lie down, start a worry book. Well before bedtime, write down your worries, and then set the book and your concerns aside. · Try meditation or other relaxation techniques before you go to bed. · If you cannot fall asleep, get up and go to another room until you feel sleepy. Do something relaxing. Repeat your bedtime routine before you go to bed again. · Make your house quiet and calm about an hour before bedtime. Turn down the lights, turn off the TV, log off the computer, and turn down the volume on music. This can help you relax after a busy day. Drowsy Driving: The [employer] cites drowsiness as a causing factor in more than 028,377 police reported crashes annually, resulting in 76,000 injuries and 1,500 deaths. Other surveys suggest 55% of people polled have driven while drowsy in the past year, 23% had fallen asleep but not crashed, 3% crashed, and 2% had and accident due to drowsy driving. Who is at risk? Young Drivers: One study of drowsy driving accidents states that 55% of the drivers were under 25 years. Of those, 75% were male. Shift Workers and Travelers: People who work overnight or travel across time zones frequently are at higher risk of experiencing Circadian Rhythm Disorders. They are trying to work and function when their body is programed to sleep. Sleep Deprived: Lack of sleep has a serious impact on your ability to pay attention or focus on a task. Consistently getting less than the average of 8 hours your body needs creates partial or cumulative sleep deprivation. Untreated Sleep Disorders: Sleep Apnea, Narcolepsy, R.L.S., and other sleep disorders (untreated) prevent a person from getting enough restful sleep. This leads to excessive daytime sleepiness and increases the risk for drowsy driving accidents by up to 7 times.   Medications / Alcohol: Even over the counter medications can cause drowsiness. Medications that impair a drivers attention should have a warning label. Alcohol naturally makes you sleepy and on its own can cause accidents. Combined with excessive drowsiness its effects are amplified. Signs of Drowsy Driving:   * You don't remember driving the last few miles   * You may drift out of your mckay   * You are unable to focus and your thoughts wander   * You may yawn more often than normal   * You have difficulty keeping your eyes open / nodding off   * Missing traffic signs, speeding, or tailgating  Prevention-   Good sleep hygiene, lifestyle and behavioral choices have the most impact on drowsy driving. There is no substitute for sleep and the average person requires 8 hours nightly. If you find yourself driving drowsy, stop and sleep. Consider the sleep hygiene tips provided during your visit as well. Medication Refill Policy: Refills for all medications require 1 week advance notice. Please have your pharmacy fax a refill request. We are unable to fax, or call in \"controled substance\" medications and you will need to pick these prescriptions up from our office. Collabera Activation    Thank you for requesting access to [employer]era. Please follow the instructions below to securely access and download your online medical record. Collabera allows you to send messages to your doctor, view your test results, renew your prescriptions, schedule appointments, and more. How Do I Sign Up? 1. In your internet browser, go to [email or web address]. 2. Click on the First Time User? Click Here link in the Sign In box. You will see the New Member Sign Up page. 3. Enter your Collabera Access Code exactly as it appears below. You will not need to use this code after youve completed the sign-up process. If you do not sign up before the expiration date, you must request a new code.     Collabera Access Code: [email or web address]  Expires: 2017  4:38 PM (This is the date your AboutOne access code will )    4. Enter the last four digits of your Social Security Number (xxxx) and Date of Birth (mm/dd/yyyy) as indicated and click Submit. You will be taken to the next sign-up page. 5. Create a AboutOne ID. This will be your AboutOne login ID and cannot be changed, so think of one that is secure and easy to remember. 6. Create a AboutOne password. You can change your password at any time. 7. Enter your Password Reset Question and Answer. This can be used at a later time if you forget your password. 8. Enter your e-mail address. You will receive e-mail notification when new information is available in [address] 9. Click Sign Up. You can now view and download portions of your medical record. 10. Click the Download Summary menu link to download a portable copy of your medical information. Additional Information    If you have questions, please call [phone]. Remember, AboutOne is NOT to be used for urgent needs. For medical emergencies, dial 911.

## 2017-03-02 NOTE — PROGRESS NOTES
[address] [facility] [place]. [place], [address]  Tel.  [phone]  Fax. 100 [facility] 60  [place], [address] [facility]  Tel.  [phone]  Fax. [phone] [address] [place] [address] Erik Patel   Tel.  [phone]  Fax. [phone]     S>Virgilio Glasgow is a 64 y.o. female seen for Sleep Problem (f/up to discuss treatment options- blue phone  in [address] [place] [address]  number [id number])    She was previously diagnosed with sleep apnea. We ordered a CPAP but she says she never received it. She would like to proceed with PAP therapy. She also describes difficulty sleeping. She stays up at night to talk to her son in [place] ([facility]). She drinks coffee at night and smokes. She is upset and sad that her son was denied a visa to come to the [place] in [place]. She is very worried about him. No Known Allergies    She has a current medication list which includes the following prescription(s): polyethylene glycol, lisinopril, glucose blood vi test strips, lancets, bupropion xl, insulin aspart, ergocalciferol, omeprazole delayed release, gabapentin, metformin, and blood-glucose meter. .      She  has a past medical history of Arthritis; Diabetes ([address] Hypercholesterolemia; and Hypertension. [place] Sleepiness Score: 6   and Modified F.O.S.Q. Score Total / 2: 12.5      O>    Visit Vitals    /74    Pulse 77    Ht 5' 3\" (1.6 m)    Wt 194 lb (88 kg)    LMP  (LMP Unknown)    SpO2 98%    BMI 34.37 kg/m2           General:   Alert, oriented, not in distress   Neck:   No JVD    Chest/Lungs:  symetrical lung expansion , no accessory muscle use    Extremities:  no obvious rashes , negative edema    Neuro:  No focal deficits ; No obvious tremor    Psych:  Normal affect ,  Normal countenance ;       A>    ICD-10-CM ICD-9-CM    1. Obstructive sleep apnea (adult) (pediatric) G47.33 327.23 AMB SUPPLY ORDER           P>    Treatment options were reviewed in detail.  she would like to proceed with PAP therapy. I have placed an order for APAP. I advised her to have PAP set up done in our office so  services will be available. she will be seen in follow-up in 6 weeks to gauge treatment response and adherence to therapy. All of her questions were addressed with the assistance of a . 2. Depression - in 2015, we sent her to a psychiatrist but hasnt seen him lately. I advised for her to discuss her stressors with her PMD and psychiatrist.   Proper sleep hygiene  reviewed today and all of her questions were addressed. Office visit exceeded 25 minutes with counseling and direction of care taking up more than 50% of the allotted time.         Karen Ann MD  Diplomate in Sleep Medicine  [facility]

## 2017-03-07 ENCOUNTER — HOSPITAL ENCOUNTER (OUTPATIENT)
Dept: GENERAL RADIOLOGY | Age: 62
Discharge: HOME | End: 2017-03-07
Payer: MEDICAID

## 2017-03-07 ENCOUNTER — OFFICE VISIT (OUTPATIENT)
Dept: FAMILY MEDICINE CLINIC | Age: 62
End: 2017-03-07

## 2017-03-07 VITALS
OXYGEN SATURATION: 94 % | BODY MASS INDEX: 45.13 KG/M2 | DIASTOLIC BLOOD PRESSURE: 67 MMHG | SYSTOLIC BLOOD PRESSURE: 113 MMHG | TEMPERATURE: 97 F | WEIGHT: 195 LBS | HEIGHT: 55 IN | HEART RATE: 84 BPM | RESPIRATION RATE: 20 BRPM

## 2017-03-07 DIAGNOSIS — E78.2 MIXED HYPERCHOLESTEROLEMIA AND HYPERTRIGLYCERIDEMIA: ICD-10-CM

## 2017-03-07 DIAGNOSIS — F33.9 MAJOR DEPRESSIVE DISORDER, RECURRENT EPISODE WITH ANXIOUS DISTRESS (HCC): ICD-10-CM

## 2017-03-07 DIAGNOSIS — E11.40 DIABETIC NEUROPATHY, PAINFUL (HCC): ICD-10-CM

## 2017-03-07 DIAGNOSIS — M79.606 LUMBAR PAIN WITH RADIATION DOWN LEG: ICD-10-CM

## 2017-03-07 DIAGNOSIS — E55.9 HYPOVITAMINOSIS D: ICD-10-CM

## 2017-03-07 DIAGNOSIS — I10 HYPERTENSION, WELL CONTROLLED: ICD-10-CM

## 2017-03-07 DIAGNOSIS — M54.50 LUMBAR PAIN WITH RADIATION DOWN LEG: ICD-10-CM

## 2017-03-07 DIAGNOSIS — J30.81 ALLERGIC RHINITIS DUE TO ANIMAL HAIR AND DANDER, UNSPECIFIED RHINITIS SEASONALITY: ICD-10-CM

## 2017-03-07 LAB — GLUCOSE POC: 208 MG/DL

## 2017-03-07 PROCEDURE — 72100 X-RAY EXAM L-S SPINE 2/3 VWS: CPT

## 2017-03-07 RX ORDER — LISINOPRIL 5 MG/1
5 TABLET ORAL DAILY
Qty: 30 TAB | Refills: 5 | Status: SHIPPED | OUTPATIENT
Start: 2017-03-07 | End: 2017-05-03 | Stop reason: SDUPTHER

## 2017-03-07 RX ORDER — LORATADINE 10 MG/1
10 TABLET ORAL
Qty: 30 TAB | Refills: 5 | Status: SHIPPED | OUTPATIENT
Start: 2017-03-07 | End: 2017-05-03 | Stop reason: SDUPTHER

## 2017-03-07 RX ORDER — ERGOCALCIFEROL 1.25 MG/1
50000 CAPSULE ORAL
Qty: 12 CAP | Refills: 3 | Status: SHIPPED | OUTPATIENT
Start: 2017-03-07 | End: 2017-05-03 | Stop reason: SDUPTHER

## 2017-03-07 RX ORDER — GABAPENTIN 100 MG/1
100 CAPSULE ORAL 3 TIMES DAILY
Qty: 90 CAP | Refills: 3 | Status: SHIPPED | OUTPATIENT
Start: 2017-03-07 | End: 2017-05-03 | Stop reason: SDUPTHER

## 2017-03-07 RX ORDER — LOVASTATIN 20 MG/1
20 TABLET ORAL
Qty: 30 TAB | Refills: 5 | Status: SHIPPED | OUTPATIENT
Start: 2017-03-07 | End: 2017-05-03 | Stop reason: SDUPTHER

## 2017-03-07 RX ORDER — BUPROPION HYDROCHLORIDE 150 MG/1
150 TABLET ORAL
Qty: 30 TAB | Refills: 5 | Status: SHIPPED | OUTPATIENT
Start: 2017-03-07 | End: 2017-05-03 | Stop reason: SDUPTHER

## 2017-03-07 NOTE — PROGRESS NOTES
Chief Complaint   Patient presents with    Diabetes     HPI:  Arianna Kirkpatrick is a 64 y.o. female presenting for 2 weeks follow up diabetes. Current medications include Nolog 40 unit AM and 15 units in PM; metformin 1g 2 times aday. She has been taking treatment, A1C=10, BS today has improved indicating that she has been taking medication. About 4 weeks prior the readings have very high. Also, she has additional complaints of lower back pain that radiates to legs bilaterally for months. Patient has not been able to walk. Review of Systems  As per hpi    Past Medical History:   Diagnosis Date    Arthritis     shoulders and knees.  Diabetes ([address]     insulin dependent     Hypercholesterolemia     Hypertension      Past Surgical History:   Procedure Laterality Date    HX GYN      hysterectomy     Social History     Social History    Marital status:      Spouse name: N/A    Number of children: N/A    Years of education: N/A     Social History Main Topics    Smoking status: Current Every Day Smoker     Packs/day: 2.00     Years: 3.00    Smokeless tobacco: Never Used    Alcohol use No    Drug use: No    Sexual activity: Not Asked     Current Outpatient Prescriptions   Medication Sig Dispense Refill    polyethylene glycol (MIRALAX) 17 gram packet Take 1 Packet by mouth every Monday, Thursday, Saturday. 30 Packet 5    lisinopril (PRINIVIL, ZESTRIL) 5 mg tablet Take 1 Tab by mouth daily. Indications: DIABETIC NEPHROPATHY, hypertension 30 Tab 5    glucose blood VI test strips (FREESTYLE INSULINX) strip Check sugar 3 times 90 Strip 11    Lancets misc Check sugar 2 times a day 1 Each 11    buPROPion XL (WELLBUTRIN XL) 150 mg tablet Take 1 Tab by mouth every morning.  Indications: ANXIETY WITH DEPRESSION 30 Tab 2    insulin aspart (NOVOLOG FLEXPEN) 100 unit/mL inpn 40 units in am, 15 units in evening with meal  Indications: type 2 diabetes mellitus 3 Pen 10    ergocalciferol (ERGOCALCIFEROL) 50,000 unit capsule Take 1 Cap by mouth every seven (7) days. 12 Cap 3    Omeprazole delayed release (PRILOSEC D/R) 20 mg tablet Take 1 Tab by mouth daily. Indications: GASTROESOPHAGEAL REFLUX 30 Tab 5    gabapentin (NEURONTIN) 100 mg capsule Take 1 Cap by mouth three (3) times daily. 90 Cap 3    metFORMIN (GLUCOPHAGE) 1,000 mg tablet Take 1 Tab by mouth two (2) times daily (with meals).  Indications: type 2 diabetes mellitus 60 Tab 7    Blood-Glucose Meter (FREESTYLE FLASH SYSTEM) monitoring kit Use as directed 1 Kit 0     No Known Allergies    Objective:  Visit Vitals    /67 (BP 1 Location: Right arm, BP Patient Position: Sitting)    Pulse 84    Temp 97 °F (36.1 °C) (Oral)    Resp 20    Ht 4' 5\" (1.346 m)    Wt 195 lb (88.5 kg)    LMP  (LMP Unknown)    SpO2 94%    BMI 48.81 kg/m2     Physical Exam:   General appearance - alert, well appearing, in no distress  Mental status - alert, oriented to person, place, and time  EYE-PERRL, EOMI  Neck - supple, no significant adenopathy   Chest - clear to auscultation, no wheezes, rales or rhonchi  Heart - normal rate, regular rhythm, normal blood pressure  Abdomen - soft, nontender, nondistended, no organomegaly  Ext-peripheral pulses normal, no pedal edema  Neuro -alert, oriented, normal speech, no focal findings  Back-antalgic gait, limited range of motion, pain with motion noted during exam     Results for orders placed or performed in visit on 69/19/80   METABOLIC PANEL, COMPREHENSIVE   Result Value Ref Range    Glucose 347 (H) 65 - 99 mg/dL    BUN 15 8 - 27 mg/dL    Creatinine 0.74 0.57 - 1.00 mg/dL    GFR est non-AA 88 >59 mL/min/1.73    GFR est  >59 mL/min/1.73    BUN/Creatinine ratio 20 11 - 26    Sodium 137 134 - 144 mmol/L    Potassium 5.0 3.5 - 5.2 mmol/L    Chloride 96 96 - 106 mmol/L    CO2 26 18 - 29 mmol/L    Calcium 9.5 8.7 - 10.3 mg/dL    Protein, total 6.9 6.0 - 8.5 g/dL    Albumin 4.1 3.6 - 4.8 g/dL    GLOBULIN, TOTAL 2.8 1.5 - 4.5 g/dL    A-G Ratio 1.5 1.1 - 2.5    Bilirubin, total 0.5 0.0 - 1.2 mg/dL    Alk. phosphatase 80 39 - 117 IU/L    AST (SGOT) 15 0 - 40 IU/L    ALT (SGPT) 16 0 - 32 IU/L   LIPID PANEL   Result Value Ref Range    Cholesterol, total 220 (H) 100 - 199 mg/dL    Triglyceride 258 (H) 0 - 149 mg/dL    HDL Cholesterol 45 >39 mg/dL    VLDL, calculated 52 (H) 5 - 40 mg/dL    LDL, calculated 123 (H) 0 - 99 mg/dL   MICROALBUMIN, UR, RAND W/ MICROALBUMIN/CREA RATIO   Result Value Ref Range    Creatinine, urine 135.8 Not Estab. mg/dL    Microalbumin, urine 6.5 Not Estab. ug/mL    Microalb/Creat ratio (ug/mg creat.) 4.8 0.0 - 30.0 mg/g creat   AMB POC GLUCOSE BLOOD, BY GLUCOSE MONITORING DEVICE   Result Value Ref Range    Glucose POC HHH mg/dL   AMB POC HEMOGLOBIN A1C   Result Value Ref Range    Hemoglobin A1c (POC) 10.5 %     Recent Results (from the past 12 hour(s))   AMB POC GLUCOSE BLOOD, BY GLUCOSE MONITORING DEVICE    Collection Time: 03/07/17  3:43 PM   Result Value Ref Range    Glucose  mg/dL     Assessment/Plan:    ICD-10-CM ICD-9-CM    1. DM type 2, uncontrolled, with neuropathy (HCC) E11.40 250.62 AMB POC GLUCOSE BLOOD, BY GLUCOSE MONITORING DEVICE    E11.65 357.2    2. Mixed hypercholesterolemia and hypertriglyceridemia E78.2 272.2 lovastatin (MEVACOR) 20 mg tablet   3. Hypovitaminosis D E55.9 268.9 ergocalciferol (ERGOCALCIFEROL) 50,000 unit capsule   4. Lumbar pain with radiation down leg M54.5 724.2 XR SPINE LUMB 2 OR 3 V      gabapentin (NEURONTIN) 100 mg capsule   5. Major depressive disorder, recurrent episode with anxious distress (HCC) F33.9 296.30 buPROPion XL (WELLBUTRIN XL) 150 mg tablet   6. Diabetic neuropathy, painful (HCC) E11.40 250.60 gabapentin (NEURONTIN) 100 mg capsule     357.2    7. Hypertension, well controlled I10 401.9 lisinopril (PRINIVIL, ZESTRIL) 5 mg tablet   8.  Allergic rhinitis due to animal hair and dander, unspecified rhinitis seasonality J30.81 477.2 loratadine (CLARITIN) 10 mg tablet     Patient Instructions        Back Pain: Care Instructions  Your Care Instructions    Back pain has many possible causes. It is often related to problems with muscles and ligaments of the back. It may also be related to problems with the nerves, discs, or bones of the back. Moving, lifting, standing, sitting, or sleeping in an awkward way can strain the back. Sometimes you don't notice the injury until later. Arthritis is another common cause of back pain. Although it may hurt a lot, back pain usually improves on its own within several weeks. Most people recover in 12 weeks or less. Using good home treatment and being careful not to stress your back can help you feel better sooner. Follow-up care is a key part of your treatment and safety. Be sure to make and go to all appointments, and call your doctor if you are having problems. Its also a good idea to know your test results and keep a list of the medicines you take. How can you care for yourself at home? · Sit or lie in positions that are most comfortable and reduce your pain. Try one of these positions when you lie down:  ¨ Lie on your back with your knees bent and supported by large pillows. ¨ Lie on the floor with your legs on the seat of a sofa or chair. [place] on your side with your knees and hips bent and a pillow between your legs. ¨ Lie on your stomach if it does not make pain worse. · Do not sit up in bed, and avoid soft couches and twisted positions. Bed rest can help relieve pain at first, but it delays healing. Avoid bed rest after the first day of back pain. · Change positions every 30 minutes. If you must sit for long periods of time, take breaks from sitting. Get up and walk around, or lie in a comfortable position. · Try using a heating pad on a low or medium setting for 15 to 20 minutes every 2 or 3 hours. Try a warm shower in place of one session with the heating pad.   · You can also try an ice pack for 10 to 15 minutes every 2 to 3 hours. Put a thin cloth between the ice pack and your skin. · Take pain medicines exactly as directed. ¨ If the doctor gave you a prescription medicine for pain, take it as prescribed. ¨ If you are not taking a prescription pain medicine, ask your doctor if you can take an over-the-counter medicine. · Take short walks several times a day. You can start with 5 to 10 minutes, 3 or 4 times a day, and work up to longer walks. Walk on level surfaces and avoid hills and stairs until your back is better. · Return to work and other activities as soon as you can. Continued rest without activity is usually not good for your back. · To prevent future back pain, do exercises to stretch and strengthen your back and stomach. Learn how to use good posture, safe lifting techniques, and proper body mechanics. When should you call for help? Call your doctor now or seek immediate medical care if:  · You have new or worsening numbness in your legs. · You have new or worsening weakness in your legs. (This could make it hard to stand up.)  · You lose control of your bladder or bowels. Watch closely for changes in your health, and be sure to contact your doctor if:  · Your pain gets worse. · You are not getting better after 2 weeks. Where can you learn more? Go to http://alley-serg.info/. Enter S407 in the search box to learn more about \"Back Pain: Care Instructions. \"  Current as of: May 23, 2016  Content Version: 11.1  © 1424-1754 [email or web address], Incorporated. Care instructions adapted under license by [employer] (which disclaims liability or warranty for this information). If you have questions about a medical condition or this instruction, always ask your healthcare professional. [place] [address] any warranty or liability for your use of this information.       Follow-up Disposition:  Return in about 2 months (around 5/7/2017) for routine follow up.

## 2017-03-07 NOTE — MR AVS SNAPSHOT
Visit Information Date & Time Provider Department Dept. Phone Encounter #  
 3/7/2017  3:00 PM Ricci Robertson MD [facility] at [address] [id number] Follow-up Instructions Return in about 2 months (around 5/7/2017) for routine follow up. Your Appointments 3/21/2017  3:20 PM  
New Patient with Ben Hoyos MD  
[facility] OFFICE-ANNEX ([facility]) Appt Note: new pt; manage diabetes [address] [facility] [phone] [address] [phone] Upcoming Health Maintenance Date Due Pneumococcal 19-64 Medium Risk (1 of 1 - PPSV23) 7/1/1974 DTaP/Tdap/Td series (1 - Tdap) 7/1/1976 FOOT EXAM Q1 6/9/2016 EYE EXAM RETINAL OR DILATED Q1 6/9/2016 FOBT Q 1 YEAR AGE 50-75 6/9/2016 BREAST CANCER SCRN MAMMOGRAM 5/6/2017 HEMOGLOBIN A1C Q6M 8/7/2017 MICROALBUMIN Q1 2/15/2018 LIPID PANEL Q1 2/15/2018 PAP AKA CERVICAL CYTOLOGY 6/9/2018 Allergies as of 3/7/2017  Review Complete On: 3/7/2017 By: Ricci Robertson MD  
 No Known Allergies Current Immunizations  Never Reviewed No immunizations on file. Not reviewed this visit You Were Diagnosed With   
  
 Codes Comments DM type 2, uncontrolled, with neuropathy ([facility] [address]    -  Primary ICD-10-CM: E11.40, E11.65 ICD-9-CM: 250.62, 357.2 Mixed hypercholesterolemia and hypertriglyceridemia     ICD-10-CM: E78.2 ICD-9-CM: 272.2 Hypovitaminosis D     ICD-10-CM: E55.9 ICD-9-CM: 268.9 Lumbar pain with radiation down leg     ICD-10-CM: M54.5 ICD-9-CM: 724.2 Major depressive disorder, recurrent episode with anxious distress ([facility] [address]     ICD-10-CM: F33.9 ICD-9-CM: 296.30 Diabetic neuropathy, painful [facility]     ICD-10-CM: E11.40 ICD-9-CM: 250.60, 357.2 Hypertension, well controlled     ICD-10-CM: I10 
ICD-9-CM: 401.9 Allergic rhinitis due to animal hair and dander, unspecified rhinitis seasonality     ICD-10-CM: J30.81 ICD-9-CM: 477.2 Vitals BP Pulse Temp Resp Height(growth percentile) Weight(growth percentile) 113/67 (BP 1 Location: Right arm, BP Patient Position: Sitting) 84 97 °F (36.1 °C) (Oral) 20 4' 5\" (1.346 m) 195 lb (88.5 kg) LMP SpO2 BMI OB Status Smoking Status (LMP Unknown) 94% 48.81 kg/m2 Hysterectomy Current Every Day Smoker Vitals History BMI and BSA Data Body Mass Index Body Surface Area  
 48.81 kg/m 2 1.82 m 2 Preferred Pharmacy Pharmacy Name Phone [facility]/PHARMACY #[id number]- [place], [address] [phone] Your Updated Medication List  
  
   
This list is accurate as of: 3/7/17  4:09 PM.  Always use your most recent med list.  
  
  
  
  
 Blood-Glucose Meter monitoring kit Commonly known as:  Freestyle Flash System Use as directed buPROPion  mg tablet Commonly known as:  Larcarlos Banangelater Take 1 Tab by mouth every morning. Indications: ANXIETY WITH DEPRESSION  
  
 ergocalciferol 50,000 unit capsule Commonly known as:  ERGOCALCIFEROL Take 1 Cap by mouth every seven (7) days. gabapentin 100 mg capsule Commonly known as:  NEURONTIN Take 1 Cap by mouth three (3) times daily. glucose blood VI test strips strip Commonly known as:  FREESTYLE INSULINX Check sugar 3 times  
  
 insulin aspart 100 unit/mL Inpn Commonly known as:  Ariel [place] 40 units in am, 15 units in evening with meal  Indications: type 2 diabetes mellitus Lancets Misc Check sugar 2 times a day  
  
 lisinopril 5 mg tablet Commonly known as:  Chari Lovington Take 1 Tab by mouth daily. Indications: DIABETIC NEPHROPATHY, hypertension  
  
 loratadine 10 mg tablet Commonly known as:  Kin Riki Take 1 Tab by mouth daily as needed for Allergies or Itching. lovastatin 20 mg tablet Commonly known as:  MEVACOR Take 1 Tab by mouth nightly. Indications: mixed hyperlipidemia  
  
 metFORMIN 1,000 mg tablet Commonly known as:  GLUCOPHAGE Take 1 Tab by mouth two (2) times daily (with meals). Indications: type 2 diabetes mellitus Omeprazole delayed release 20 mg tablet Commonly known as:  PRILOSEC D/R Take 1 Tab by mouth daily. Indications: GASTROESOPHAGEAL REFLUX polyethylene glycol 17 gram packet Commonly known as:  Pricemond Kishore Take 1 Packet by mouth every Monday, Thursday, Saturday. Prescriptions Sent to Pharmacy Refills  
 lovastatin (MEVACOR) 20 mg tablet 5 Sig: Take 1 Tab by mouth nightly. Indications: mixed hyperlipidemia Class: Normal  
 Pharmacy: [facility]/pharmacy #[id number]- [place], [employer] Ph #: [phone] Route: Oral  
 buPROPion XL (WELLBUTRIN XL) 150 mg tablet 5 Sig: Take 1 Tab by mouth every morning. Indications: ANXIETY WITH DEPRESSION Class: Normal  
 Pharmacy: [facility]/pharmacy #[id number] [employer] Ph #: [phone] Route: Oral  
 gabapentin (NEURONTIN) 100 mg capsule 3 Sig: Take 1 Cap by mouth three (3) times daily. Class: Normal  
 Pharmacy: [facility]/pharmacy #[id number]- [facility] [employer] Ph #: [phone] Route: Oral  
 lisinopril (PRINIVIL, ZESTRIL) 5 mg tablet 5 Sig: Take 1 Tab by mouth daily. Indications: DIABETIC NEPHROPATHY, hypertension Class: Normal  
 Pharmacy: [facility]/pharmacy #[id number] [employer] Ph #: [phone] Route: Oral  
 ergocalciferol (ERGOCALCIFEROL) 50,000 unit capsule 3 Sig: Take 1 Cap by mouth every seven (7) days. Class: Normal  
 Pharmacy: [facility]/pharmacy #[id number]- [facility] [employer] Ph #: [phone]  Route: Oral  
 loratadine (CLARITIN) 10 mg tablet 5 Sig: Take 1 Tab by mouth daily as needed for Allergies or Itching. Class: Normal  
 Pharmacy: [facility]/pharmacy #[id number]- [place], [address] [place] [address] #: [phone] Route: Oral  
  
We Performed the Following AMB POC GLUCOSE BLOOD, BY GLUCOSE MONITORING DEVICE [31311 CPT(R)] Follow-up Instructions Return in about 2 months (around 5/7/2017) for routine follow up. To-Do List   
 03/07/2017 Imaging:  XR SPINE LUMB 2 OR 3 V Introducing [facility]! Domenico Marion introduces [employer]ver patient portal. Now you can access parts of your medical record, email your doctor's office, and request medication refills online. 1. In your internet browser, go to [email or web address] Take the Interview/Bablic 2. Click on the First Time User? Click Here link in the Sign In box. You will see the New Member Sign Up page. 3. Enter your Forever Access Code exactly as it appears below. You will not need to use this code after youve completed the sign-up process. If you do not sign up before the expiration date, you must request a new code. · Forever Access Code: [email or web address] Expires: 5/8/2017  4:38 PM 
 
4. Enter the last four digits of your Social Security Number (xxxx) and Date of Birth (mm/dd/yyyy) as indicated and click Submit. You will be taken to the next sign-up page. 5. Create a Tickadet ID. This will be your Forever login ID and cannot be changed, so think of one that is secure and easy to remember. 6. Create a Forever password. You can change your password at any time. 7. Enter your Password Reset Question and Answer. This can be used at a later time if you forget your password. 8. Enter your e-mail address. You will receive e-mail notification when new information is available in [address]. 9. Click Sign Up. You can now view and download portions of your medical record. 10. Click the Download Summary menu link to download a portable copy of your medical information. If you have questions, please visit the Frequently Asked Questions section of the [employer] website. Remember, [employer] is NOT to be used for urgent needs. For medical emergencies, dial 911. Now available from your iPhone and Android! Please provide this summary of care documentation to your next provider. Your primary care clinician is listed as Belgica Rivera. If you have any questions after today's visit, please call [phone].

## 2017-03-07 NOTE — PROGRESS NOTES
1. Have you been to the ER, urgent care clinic since your last visit? Hospitalized since your last visit? No    2. Have you seen or consulted any other health care providers outside of the [address] [place] [address] since your last visit? Include any pap smears or colon screening.  No     Pt is here for follow up

## 2017-03-09 NOTE — PATIENT INSTRUCTIONS
Back Pain: Care Instructions  Your Care Instructions    Back pain has many possible causes. It is often related to problems with muscles and ligaments of the back. It may also be related to problems with the nerves, discs, or bones of the back. Moving, lifting, standing, sitting, or sleeping in an awkward way can strain the back. Sometimes you don't notice the injury until later. Arthritis is another common cause of back pain. Although it may hurt a lot, back pain usually improves on its own within several weeks. Most people recover in 12 weeks or less. Using good home treatment and being careful not to stress your back can help you feel better sooner. Follow-up care is a key part of your treatment and safety. Be sure to make and go to all appointments, and call your doctor if you are having problems. Its also a good idea to know your test results and keep a list of the medicines you take. How can you care for yourself at home? · Sit or lie in positions that are most comfortable and reduce your pain. Try one of these positions when you lie down:  ¨ Lie on your back with your knees bent and supported by large pillows. ¨ Lie on the floor with your legs on the seat of a sofa or chair. Matilde Gerardo on your side with your knees and hips bent and a pillow between your legs. ¨ Lie on your stomach if it does not make pain worse. · Do not sit up in bed, and avoid soft couches and twisted positions. Bed rest can help relieve pain at first, but it delays healing. Avoid bed rest after the first day of back pain. · Change positions every 30 minutes. If you must sit for long periods of time, take breaks from sitting. Get up and walk around, or lie in a comfortable position. · Try using a heating pad on a low or medium setting for 15 to 20 minutes every 2 or 3 hours. Try a warm shower in place of one session with the heating pad. · You can also try an ice pack for 10 to 15 minutes every 2 to 3 hours.  Put a thin cloth between the ice pack and your skin. · Take pain medicines exactly as directed. ¨ If the doctor gave you a prescription medicine for pain, take it as prescribed. ¨ If you are not taking a prescription pain medicine, ask your doctor if you can take an over-the-counter medicine. · Take short walks several times a day. You can start with 5 to 10 minutes, 3 or 4 times a day, and work up to longer walks. Walk on level surfaces and avoid hills and stairs until your back is better. · Return to work and other activities as soon as you can. Continued rest without activity is usually not good for your back. · To prevent future back pain, do exercises to stretch and strengthen your back and stomach. Learn how to use good posture, safe lifting techniques, and proper body mechanics. When should you call for help? Call your doctor now or seek immediate medical care if:  · You have new or worsening numbness in your legs. · You have new or worsening weakness in your legs. (This could make it hard to stand up.)  · You lose control of your bladder or bowels. Watch closely for changes in your health, and be sure to contact your doctor if:  · Your pain gets worse. · You are not getting better after 2 weeks. Where can you learn more? Go to http://alley-serg.info/. Enter U098 in the search box to learn more about \"Back Pain: Care Instructions. \"  Current as of: May 23, 2016  Content Version: 11.1  © 4913-8721 [employer]. Care instructions adapted under license by [employer] (which disclaims liability or warranty for this information). If you have questions about a medical condition or this instruction, always ask your healthcare professional. [address] any warranty or liability for your use of this information.

## 2017-03-22 ENCOUNTER — DOCUMENTATION ONLY (OUTPATIENT)
Dept: SLEEP MEDICINE | Age: 62
End: 2017-03-22

## 2017-05-03 ENCOUNTER — OFFICE VISIT (OUTPATIENT)
Dept: FAMILY MEDICINE CLINIC | Age: 62
End: 2017-05-03

## 2017-05-03 VITALS
TEMPERATURE: 97.1 F | WEIGHT: 191.7 LBS | OXYGEN SATURATION: 98 % | RESPIRATION RATE: 20 BRPM | SYSTOLIC BLOOD PRESSURE: 98 MMHG | DIASTOLIC BLOOD PRESSURE: 60 MMHG | HEART RATE: 81 BPM | BODY MASS INDEX: 33.96 KG/M2 | HEIGHT: 63 IN

## 2017-05-03 DIAGNOSIS — Z78.9 DECREASED ACTIVITIES OF DAILY LIVING (ADL): ICD-10-CM

## 2017-05-03 DIAGNOSIS — R21 RASH: ICD-10-CM

## 2017-05-03 DIAGNOSIS — E11.40 DIABETIC NEUROPATHY, PAINFUL (HCC): ICD-10-CM

## 2017-05-03 DIAGNOSIS — M54.50 LUMBAR PAIN WITH RADIATION DOWN LEG: ICD-10-CM

## 2017-05-03 DIAGNOSIS — E55.9 HYPOVITAMINOSIS D: ICD-10-CM

## 2017-05-03 DIAGNOSIS — M79.606 LUMBAR PAIN WITH RADIATION DOWN LEG: ICD-10-CM

## 2017-05-03 DIAGNOSIS — E78.2 MIXED HYPERCHOLESTEROLEMIA AND HYPERTRIGLYCERIDEMIA: ICD-10-CM

## 2017-05-03 DIAGNOSIS — F33.9 MAJOR DEPRESSIVE DISORDER, RECURRENT EPISODE WITH ANXIOUS DISTRESS (HCC): ICD-10-CM

## 2017-05-03 DIAGNOSIS — R10.13 EPIGASTRIC ABDOMINAL PAIN: ICD-10-CM

## 2017-05-03 DIAGNOSIS — M47.26 OSTEOARTHRITIS OF SPINE WITH RADICULOPATHY, LUMBAR REGION: ICD-10-CM

## 2017-05-03 DIAGNOSIS — I10 HYPERTENSION, WELL CONTROLLED: ICD-10-CM

## 2017-05-03 LAB
GLUCOSE POC: 300 MG/DL
HBA1C MFR BLD HPLC: 10 %

## 2017-05-03 RX ORDER — METFORMIN HYDROCHLORIDE 1000 MG/1
1000 TABLET ORAL 2 TIMES DAILY WITH MEALS
Qty: 60 TAB | Refills: 7 | Status: SHIPPED | OUTPATIENT
Start: 2017-05-03 | End: 2017-07-20 | Stop reason: SDUPTHER

## 2017-05-03 RX ORDER — LORATADINE 10 MG/1
10 TABLET ORAL
Qty: 30 TAB | Refills: 5 | Status: SHIPPED | OUTPATIENT
Start: 2017-05-03 | End: 2017-12-20 | Stop reason: ALTCHOICE

## 2017-05-03 RX ORDER — BUPROPION HYDROCHLORIDE 150 MG/1
150 TABLET ORAL
Qty: 30 TAB | Refills: 5 | Status: SHIPPED | OUTPATIENT
Start: 2017-05-03 | End: 2017-12-20 | Stop reason: SDUPTHER

## 2017-05-03 RX ORDER — PHENOL/SODIUM PHENOLATE
20 AEROSOL, SPRAY (ML) MUCOUS MEMBRANE DAILY
Qty: 30 TAB | Refills: 5 | Status: SHIPPED | OUTPATIENT
Start: 2017-05-03 | End: 2017-12-20 | Stop reason: ALTCHOICE

## 2017-05-03 RX ORDER — LOVASTATIN 20 MG/1
20 TABLET ORAL
Qty: 30 TAB | Refills: 5 | Status: SHIPPED | OUTPATIENT
Start: 2017-05-03 | End: 2017-12-24 | Stop reason: ALTCHOICE

## 2017-05-03 RX ORDER — INSULIN PUMP SYRINGE, 3 ML
EACH MISCELLANEOUS
Qty: 1 KIT | Refills: 0 | Status: SHIPPED | OUTPATIENT
Start: 2017-05-03 | End: 2017-12-20 | Stop reason: SDUPTHER

## 2017-05-03 RX ORDER — INSULIN ASPART 100 [IU]/ML
INJECTION, SOLUTION INTRAVENOUS; SUBCUTANEOUS
Qty: 3 PEN | Refills: 10 | Status: SHIPPED | OUTPATIENT
Start: 2017-05-03 | End: 2017-05-04 | Stop reason: SDUPTHER

## 2017-05-03 RX ORDER — GABAPENTIN 100 MG/1
100 CAPSULE ORAL 3 TIMES DAILY
Qty: 90 CAP | Refills: 3 | Status: SHIPPED | OUTPATIENT
Start: 2017-05-03 | End: 2017-12-20 | Stop reason: SDUPTHER

## 2017-05-03 RX ORDER — ERGOCALCIFEROL 1.25 MG/1
50000 CAPSULE ORAL
Qty: 12 CAP | Refills: 3 | Status: SHIPPED | OUTPATIENT
Start: 2017-05-03 | End: 2017-12-20 | Stop reason: SDUPTHER

## 2017-05-03 RX ORDER — DIAPER,BRIEF,INFANT-TODD,DISP
EACH MISCELLANEOUS 2 TIMES DAILY
Qty: 30 G | Refills: 0 | Status: SHIPPED | OUTPATIENT
Start: 2017-05-03 | End: 2017-12-20 | Stop reason: ALTCHOICE

## 2017-05-03 RX ORDER — LISINOPRIL 5 MG/1
5 TABLET ORAL DAILY
Qty: 30 TAB | Refills: 5 | Status: SHIPPED | OUTPATIENT
Start: 2017-05-03 | End: 2017-12-20 | Stop reason: SDUPTHER

## 2017-05-03 RX ORDER — LANCETS
EACH MISCELLANEOUS
Qty: 1 EACH | Refills: 11 | Status: SHIPPED | OUTPATIENT
Start: 2017-05-03 | End: 2017-07-20 | Stop reason: SDUPTHER

## 2017-05-03 NOTE — MR AVS SNAPSHOT
Visit Information Date & Time Provider Department Dept. Phone Encounter #  
 5/3/2017  3:00 PM Jaclyn Hdz MD [facility] at [address] [id number] Follow-up Instructions Return in about 3 months (around 8/3/2017), or if symptoms worsen or fail to improve, for routine follow up. Upcoming Health Maintenance Date Due Pneumococcal 19-64 Medium Risk (1 of 1 - PPSV23) 7/1/1974 DTaP/Tdap/Td series (1 - Tdap) 7/1/1976 FOOT EXAM Q1 6/9/2016 EYE EXAM RETINAL OR DILATED Q1 6/9/2016 FOBT Q 1 YEAR AGE 50-75 6/9/2016 BREAST CANCER SCRN MAMMOGRAM 5/6/2017 INFLUENZA AGE 9 TO ADULT 8/1/2017 HEMOGLOBIN A1C Q6M 8/7/2017 MICROALBUMIN Q1 2/15/2018 LIPID PANEL Q1 2/15/2018 PAP AKA CERVICAL CYTOLOGY 6/9/2018 Allergies as of 5/3/2017  Review Complete On: 5/3/2017 By: Jaclyn Hdz MD  
 No Known Allergies Current Immunizations  Never Reviewed No immunizations on file. Not reviewed this visit You Were Diagnosed With   
  
 Codes Comments DM type 2, uncontrolled, with neuropathy ([facility] [address]    -  Primary ICD-10-CM: E11.40, E11.65 ICD-9-CM: 250.62, 357.2 Mixed hypercholesterolemia and hypertriglyceridemia     ICD-10-CM: E78.2 ICD-9-CM: 272.2 Major depressive disorder, recurrent episode with anxious distress ([facility] [address]     ICD-10-CM: F33.9 ICD-9-CM: 296.30 Diabetic neuropathy, painful ([facility] [address]     ICD-10-CM: E11.40 ICD-9-CM: 250.60, 357.2 Lumbar pain with radiation down leg     ICD-10-CM: M54.5 ICD-9-CM: 724.2 Hypertension, well controlled     ICD-10-CM: I10 
ICD-9-CM: 401.9 Hypovitaminosis D     ICD-10-CM: E55.9 ICD-9-CM: 268.9 Epigastric abdominal pain     ICD-10-CM: R10.13 ICD-9-CM: 789.06 Osteoarthritis of spine with radiculopathy, lumbar region     ICD-10-CM: M47.26 
ICD-9-CM: 721.3 Decreased activities of daily living (ADL)     ICD-10-CM: Z78.9 ICD-9-CM: V49.89 Vitals BP Pulse Temp Resp Height(growth percentile) Weight(growth percentile) 98/60 (BP 1 Location: Left arm, BP Patient Position: Sitting) 81 97.1 °F (36.2 °C) (Oral) 20 5' 3\" (1.6 m) 191 lb 11.2 oz (87 kg) LMP SpO2 BMI OB Status Smoking Status (LMP Unknown) 98% 33.96 kg/m2 Hysterectomy Current Every Day Smoker Vitals History BMI and BSA Data Body Mass Index Body Surface Area  
 33.96 kg/m 2 1.97 m 2 Preferred Pharmacy Pharmacy Name Phone [id number] [place], [address] [phone] Your Updated Medication List  
  
   
This list is accurate as of: 5/3/17  4:13 PM.  Always use your most recent med list.  
  
  
  
  
 Blood-Glucose Meter monitoring kit Commonly known as:  Freestyle Flash System Use as directed buPROPion  mg tablet Commonly known as:  Monique Pointer Take 1 Tab by mouth every morning. Indications: ANXIETY WITH DEPRESSION  
  
 ergocalciferol 50,000 unit capsule Commonly known as:  ERGOCALCIFEROL Take 1 Cap by mouth every seven (7) days. gabapentin 100 mg capsule Commonly known as:  NEURONTIN Take 1 Cap by mouth three (3) times daily. glucose blood VI test strips strip Commonly known as:  FREESTYLE INSULINX Check sugar 3 times  
  
 insulin aspart 100 unit/mL Inpn Commonly known as:  Luis Alberto Deras 40 units in am, 15 units in evening with meal  Indications: type 2 diabetes mellitus Lancets Misc Check sugar 2 times a day  
  
 lisinopril 5 mg tablet Commonly known as:  Walker Ku Take 1 Tab by mouth daily. Indications: DIABETIC NEPHROPATHY, hypertension  
  
 loratadine 10 mg tablet Commonly known as:  Keller Siemens Take 1 Tab by mouth daily as needed for Allergies or Itching. lovastatin 20 mg tablet Commonly known as:  MEVACOR Take 1 Tab by mouth nightly. Indications: mixed hyperlipidemia  
  
 metFORMIN 1,000 mg tablet Commonly known as:  GLUCOPHAGE Take 1 Tab by mouth two (2) times daily (with meals). Indications: type 2 diabetes mellitus Omeprazole delayed release 20 mg tablet Commonly known as:  PRILOSEC D/R Take 1 Tab by mouth daily. Indications: gastroesophageal reflux disease  
  
 polyethylene glycol 17 gram packet Commonly known as:  Chris Hoops Take 1 Packet by mouth every Monday, Thursday, Saturday. Prescriptions Sent to Pharmacy Refills  
 lovastatin (MEVACOR) 20 mg tablet 5 Sig: Take 1 Tab by mouth nightly. Indications: mixed hyperlipidemia Class: Normal  
 Pharmacy: [facility]/pharmacy #[id number] [address] [address] Ph #: [phone] Route: Oral  
 buPROPion XL (WELLBUTRIN XL) 150 mg tablet 5 Sig: Take 1 Tab by mouth every morning. Indications: ANXIETY WITH DEPRESSION Class: Normal  
 Pharmacy: [facility]/pharmacy #[id number] [address] [address] Ph #: [phone] Route: Oral  
 gabapentin (NEURONTIN) 100 mg capsule 3 Sig: Take 1 Cap by mouth three (3) times daily. Class: Normal  
 Pharmacy: [facility]/pharmacy #[id number] [address] [address] Ph #: [phone] Route: Oral  
 lisinopril (PRINIVIL, ZESTRIL) 5 mg tablet 5 Sig: Take 1 Tab by mouth daily. Indications: DIABETIC NEPHROPATHY, hypertension Class: Normal  
 Pharmacy: [facility]/pharmacy #[id number] [address] [address] Ph #: [phone] Route: Oral  
 ergocalciferol (ERGOCALCIFEROL) 50,000 unit capsule 3 Sig: Take 1 Cap by mouth every seven (7) days. Class: Normal  
 Pharmacy: [facility]/pharmacy #[id number] [address] [address] Ph #: [phone] Route: Oral  
 loratadine (CLARITIN) 10 mg tablet 5 Sig: Take 1 Tab by mouth daily as needed for Allergies or Itching.   
 Class: Normal  
 Pharmacy: [facility]/pharmacy #[id number] Ph #: [phone] Route: Oral  
 glucose blood VI test strips (FREESTYLE INSULINX) strip 11 Sig: Check sugar 3 times Class: Normal  
 Pharmacy: [facility]/pharmacy #[id number] Ph #: [phone]  
 insulin aspart (NOVOLOG FLEXPEN) 100 unit/mL inpn 10 Si units in am, 15 units in evening with meal  Indications: type 2 diabetes mellitus Class: Normal  
 Pharmacy: [facility]/pharmacy #[id number] Ph #: [phone] Omeprazole delayed release (PRILOSEC D/R) 20 mg tablet 5 Sig: Take 1 Tab by mouth daily. Indications: gastroesophageal reflux disease Class: Normal  
 Pharmacy: [facility]/pharmacy #[id number] Ph #: [phone] Route: Oral  
 metFORMIN (GLUCOPHAGE) 1,000 mg tablet 7 Sig: Take 1 Tab by mouth two (2) times daily (with meals). Indications: type 2 diabetes mellitus Class: Normal  
 Pharmacy: [facility]/pharmacy #[id number] Ph #: [phone] Route: Oral  
 Blood-Glucose Meter (FREESTYLE FLASH SYSTEM) monitoring kit 0 Sig: Use as directed Class: Normal  
 Pharmacy: [facility]/pharmacy #[id number] Ph #: [phone] Lancets misc 11 Sig: Check sugar 2 times a day Class: Normal  
 Pharmacy: [facility]/pharmacy #[id number] Ph #: [phone] We Performed the Following AMB POC GLUCOSE BLOOD, BY GLUCOSE MONITORING DEVICE [34850 CPT(R)] AMB POC HEMOGLOBIN A1C [11717 CPT(R)] REFERRAL TO ENDOCRINOLOGY [MLP31 Custom] Comments: To help treat uncontrolled diabetes [address] Comments: Patient needs at least 12 hour of service for medication administration, and ADL Follow-up Instructions Return in about 3 months (around 8/3/2017), or if symptoms worsen or fail to improve, for routine follow up. Referral Information Referral ID Referred By Referred To  
  
 [id number] [facility] ANDREW RAY MD   
   [address] [place] [address] [address] [place], [address] Phone: [phone] Fax: [phone] Visits Status Start Date End Date 1 New Request 5/3/17 5/3/18 If your referral has a status of pending review or denied, additional information will be sent to support the outcome of this decision. Referral ID Referred By Referred To  
 [id number] [facility] [place].Heller D Not Available Visits Status Start Date End Date 1 New Request 5/3/17 5/3/18 If your referral has a status of pending review or denied, additional information will be sent to support the outcome of this decision. Patient Instructions High Cholesterol: Care Instructions Your Care Instructions Cholesterol is a type of fat in your blood. It is needed for many body functions, such as making new cells. Cholesterol is made by your body. It also comes from food you eat. High cholesterol means that you have too much of the fat in your blood. This raises your risk of a heart attack and stroke. LDL and HDL are part of your total cholesterol. LDL is the \"bad\" cholesterol. High LDL can raise your risk for heart disease, heart attack, and stroke. HDL is the \"good\" cholesterol. It helps clear bad cholesterol from the body. High HDL is linked with a lower risk of heart disease, heart attack, and stroke. Your cholesterol levels help your doctor find out your risk for having a heart attack or stroke. You and your doctor can talk about whether you need to lower your risk and what treatment is best for you.  
A heart-healthy lifestyle along with medicines can help lower your cholesterol and your risk. The way you choose to lower your risk will depend on how high your risk is for heart attack and stroke. It will also depend on how you feel about taking medicines. Follow-up care is a key part of your treatment and safety. Be sure to make and go to all appointments, and call your doctor if you are having problems. It's also a good idea to know your test results and keep a list of the medicines you take. How can you care for yourself at home? · Eat a variety of foods every day. Good choices include fruits, vegetables, whole grains (like oatmeal), dried beans and peas, nuts and seeds, soy products (like tofu), and fat-free or low-fat dairy products. · Replace butter, margarine, and hydrogenated or partially hydrogenated oils with olive and canola oils. (Canola oil margarine without trans fat is fine.) · Replace red meat with fish, poultry, and soy protein (like tofu). · Limit processed and packaged foods like chips, crackers, and cookies. · Bake, broil, or steam foods. Don't lehman them. · Be physically active. Get at least 30 minutes of exercise on most days of the week. Walking is a good choice. You also may want to do other activities, such as running, swimming, cycling, or playing tennis or team sports. · Stay at a healthy weight or lose weight by making the changes in eating and physical activity listed above. Losing just a small amount of weight, even 5 to 10 pounds, can reduce your risk for having a heart attack or stroke. · Do not smoke. When should you call for help? Watch closely for changes in your health, and be sure to contact your doctor if: 
· You need help making lifestyle changes. · You have questions about your medicine. Where can you learn more? Go to http://alley-serg.info/. Enter O919 in the search box to learn more about \"High Cholesterol: Care Instructions. \" Current as of: January 27, 2016 Content Version: 11.2 © 9876-4058 Healthwise, Incorporated. Care instructions adapted under license by [employer] (which disclaims liability or warranty for this information). If you have questions about a medical condition or this instruction, always ask your healthcare professional. [address] any warranty or liability for your use of this information. Introducing \[facility]\"" & HEALTH SERVICES! Toribio Nyhan introduces [employer] patient portal. Now you can access parts of your medical record, email your doctor's office, and request medication refills online. 1. In your internet browser, go to [email or web address] 2. Click on the First Time User? Click Here link in the Sign In box. You will see the New Member Sign Up page. 3. Enter your [employer] Access Code exactly as it appears below. You will not need to use this code after youve completed the sign-up process. If you do not sign up before the expiration date, you must request a new code. · [employer] Access Code: [email or web address] Expires: 5/8/2017  5:38 PM 
 
4. Enter the last four digits of your Social Security Number (xxxx) and Date of Birth (mm/dd/yyyy) as indicated and click Submit. You will be taken to the next sign-up page. 5. Create a Ring[employer] ID. This will be your [employer] login ID and cannot be changed, so think of one that is secure and easy to remember. 6. Create a [employer] password. You can change your password at any time. 7. Enter your Password Reset Question and Answer. This can be used at a later time if you forget your password. 8. Enter your e-mail address. You will receive e-mail notification when new information is available in [address] 9. Click Sign Up. You can now view and download portions of your medical record. 10. Click the Download Summary menu link to download a portable copy of your medical information.  
 
If you have questions, please visit the Frequently Asked Questions section of the [employer]. Remember, the[employer] is NOT to be used for urgent needs. For medical emergencies, dial 911. Now available from your iPhone and Android! Please provide this summary of care documentation to your next provider. Your primary care clinician is listed as Pavel Eduardo. If you have any questions after today's visit, please call [phone].

## 2017-05-03 NOTE — PROGRESS NOTES
1. Have you been to the ER, urgent care clinic since your last visit? Hospitalized since your last visit? No    2. Have you seen or consulted any other health care providers outside of the [address] [place] [address] since your last visit? Include any pap smears or colon screening.  No     Pt is here for follow up

## 2017-05-03 NOTE — LETTER
5/3/2017 3:55 PM 
 
Ms. Kemal Nguyen [address] [facility] [address] Dear Kemal Nguyen: 
 
Please find your most recent results below. Cholesterol is up Resulted Orders AMB POC GLUCOSE BLOOD, BY GLUCOSE MONITORING DEVICE Result Value Ref Range Glucose POC [facility] mg/dL Comment:  
   doctor notified AMB POC HEMOGLOBIN A1C Result Value Ref Range Hemoglobin A1c (POC) 10.5 % METABOLIC PANEL, COMPREHENSIVE Result Value Ref Range Glucose 347 (H) 65 - 99 mg/dL BUN 15 8 - 27 mg/dL Creatinine 0.74 0.57 - 1.00 mg/dL GFR est non-AA 88 >59 mL/min/1.73 GFR est  >59 mL/min/1.73  
 BUN/Creatinine ratio 20 11 - 26 Sodium 137 134 - 144 mmol/L Potassium 5.0 3.5 - 5.2 mmol/L Chloride 96 96 - 106 mmol/L  
 CO2 26 18 - 29 mmol/L Calcium 9.5 8.7 - 10.3 mg/dL Protein, total 6.9 6.0 - 8.5 g/dL Albumin 4.1 3.6 - 4.8 g/dL GLOBULIN, TOTAL 2.8 1.5 - 4.5 g/dL A-G Ratio 1.5 1.1 - 2.5 Comment: **Effective March 13, 2017 the reference interval** 
  for A/G Ratio will be changing to: Age                Male          Female 0 -  7 days       1.1 - 2.3       1.1 - 2.3 
          8 - 30 days       1.2 - 2.8       1.2 - 2.8 
          1 -  6 months     1.3 - 3.6       1.3 - 3.6 
   7 months -  5 years      1.5 - 2.6       1.5 - 2.6 
             > 5 years      1.2 - 2.2       1.2 - 2.2 Bilirubin, total 0.5 0.0 - 1.2 mg/dL Alk. phosphatase 80 39 - 117 IU/L  
 AST (SGOT) 15 0 - 40 IU/L  
 ALT (SGPT) 16 0 - 32 IU/L Narrative Performed at:  [address]  [id number] : Claudette Saunders MD, Phone:  [phone] LIPID PANEL Result Value Ref Range Cholesterol, total 220 (H) 100 - 199 mg/dL Triglyceride 258 (H) 0 - 149 mg/dL HDL Cholesterol 45 >39 mg/dL VLDL, calculated 52 (H) 5 - 40 mg/dL LDL, calculated 123 (H) 0 - 99 mg/dL Narrative Performed at:  [address]  [id number] : Ronal Skinner MD, Phone:  [phone] MICROALBUMIN, UR, RAND W/ MICROALBUMIN/CREA RATIO Result Value Ref Range Creatinine, urine 135.8 Not Estab. mg/dL Microalbumin, urine 6.5 Not Estab. ug/mL Microalb/Creat ratio (ug/mg creat.) 4.8 0.0 - 30.0 mg/g creat Narrative Performed at:  [address]  [id number] : Ronal Skinner MD, Phone:  [phone] RECOMMENDATIONS: 
Continue with current  diet and medications. Please call me if you have any questions: [phone] Sincerely, Maggie Arndt MD

## 2017-05-03 NOTE — PROGRESS NOTES
Chief Complaint   Patient presents with    Diabetes     HPI:  Kemal Nguyen is a 64 y.o. female presenting for 6 weeks follow up diabetes accompanied by daughter and intepreter. Current medications include Nolog 40 unit AM and 15 units in PM; metformin 1g 2 times a day. Patient is not compliant with treatment regimen, including diet. A1C=10, NF=765 mg/dl which has not improved. During last visit and for several occasendocrinologyferred to [facility]ology[facility]. They always have excuse for not making the appointment. I have made clear to family and patient that she has to follow a specialist because I really can not help her. Review of Systems  As per hpi    Past Medical History:   Diagnosis Date    Arthritis     shoulders and knees.  Diabetes ([address]     insulin dependent     Hypercholesterolemia     Hypertension      Past Surgical History:   Procedure Laterality Date    HX GYN      hysterectomy     Social History     Social History    Marital status:      Spouse name: N/A    Number of children: N/A    Years of education: N/A     Social History Main Topics    Smoking status: Current Every Day Smoker     Packs/day: 2.00     Years: 3.00    Smokeless tobacco: Never Used    Alcohol use No    Drug use: No    Sexual activity: Not Asked     Other Topics Concern    None     Social History Narrative     Family History   Problem Relation Age of Onset    Diabetes Brother      Current Outpatient Prescriptions   Medication Sig Dispense Refill    lovastatin (MEVACOR) 20 mg tablet Take 1 Tab by mouth nightly. Indications: mixed hyperlipidemia 30 Tab 5    gabapentin (NEURONTIN) 100 mg capsule Take 1 Cap by mouth three (3) times daily. 90 Cap 3    lisinopril (PRINIVIL, ZESTRIL) 5 mg tablet Take 1 Tab by mouth daily. Indications: DIABETIC NEPHROPATHY, hypertension 30 Tab 5    loratadine (CLARITIN) 10 mg tablet Take 1 Tab by mouth daily as needed for Allergies or Itching.  30 Tab 5    polyethylene glycol (MIRALAX) 17 gram packet Take 1 Packet by mouth every Monday, Thursday, Saturday. 30 Packet 5    glucose blood VI test strips (FREESTYLE INSULINX) strip Check sugar 3 times 90 Strip 11    Lancets misc Check sugar 2 times a day 1 Each 11    insulin aspart (NOVOLOG FLEXPEN) 100 unit/mL inpn 40 units in am, 15 units in evening with meal  Indications: type 2 diabetes mellitus 3 Pen 10    Omeprazole delayed release (PRILOSEC D/R) 20 mg tablet Take 1 Tab by mouth daily. Indications: GASTROESOPHAGEAL REFLUX 30 Tab 5    metFORMIN (GLUCOPHAGE) 1,000 mg tablet Take 1 Tab by mouth two (2) times daily (with meals). Indications: type 2 diabetes mellitus 60 Tab 7    Blood-Glucose Meter (FREESTYLE FLASH SYSTEM) monitoring kit Use as directed 1 Kit 0    buPROPion XL (WELLBUTRIN XL) 150 mg tablet Take 1 Tab by mouth every morning. Indications: ANXIETY WITH DEPRESSION 30 Tab 5    ergocalciferol (ERGOCALCIFEROL) 50,000 unit capsule Take 1 Cap by mouth every seven (7) days.  12 Cap 3     No Known Allergies    Objective:  Visit Vitals    BP 98/60 (BP 1 Location: Left arm, BP Patient Position: Sitting)    Pulse 81    Temp 97.1 °F (36.2 °C) (Oral)    Resp 20    Ht 5' 3\" (1.6 m)    Wt 191 lb 11.2 oz (87 kg)    LMP  (LMP Unknown)    SpO2 98%    BMI 33.96 kg/m2     Physical Exam:   General appearance - alert, well appearing, in no distress  Mental status - alert, oriented to person, place, and time  EYE-PERRL, EOMI  Neck - supple, no significant adenopathy   Chest - clear to auscultation, no wheezes, rales or rhonchi  Heart - normal rate, regular rhythm, normal blood pressure  Abdomen - soft, nontender, nondistended, no organomegaly  Ext-peripheral pulses normal, no pedal edema  Neuro -alert, oriented, normal speech, no focal findings     Results for orders placed or performed in visit on 05/03/17   AMB POC GLUCOSE BLOOD, BY GLUCOSE MONITORING DEVICE   Result Value Ref Range    Glucose  mg/dL   AMB POC HEMOGLOBIN A1C   Result Value Ref Range    Hemoglobin A1c (POC) 10.0 %     Assessment/Plan:    ICD-10-CM ICD-9-CM    1. DM type 2, uncontrolled, with neuropathy ([facility]) E11.40 250.62 AMB POC GLUCOSE BLOOD, BY GLUCOSE MONITORING DEVICE    E11.65 357.2 AMB POC HEMOGLOBIN A1C      REFERRAL TO ENDOCRINOLOGY      glucose blood VI test strips (FREESTYLE INSULINX) strip      metFORMIN (GLUCOPHAGE) 1,000 mg tablet      Lancets misc      insulin aspart (NOVOLOG FLEXPEN) 100 unit/mL inpn      DISCONTINUED: insulin aspart (NOVOLOG FLEXPEN) 100 unit/mL inpn   2. Mixed hypercholesterolemia and hypertriglyceridemia E78.2 272.2 lovastatin (MEVACOR) 20 mg tablet   3. Major depressive disorder, recurrent episode with anxious distress ([facility]) F33.9 296.30 buPROPion XL (WELLBUTRIN XL) 150 mg tablet   4. Diabetic neuropathy, painful ([facility]) E11.40 250.60 gabapentin (NEURONTIN) 100 mg capsule     357.2    5. Lumbar pain with radiation down leg M54.5 724.2 gabapentin (NEURONTIN) 100 mg capsule   6. Hypertension, well controlled I10 401.9 lisinopril (PRINIVIL, ZESTRIL) 5 mg tablet   7. Hypovitaminosis D E55.9 268.9 ergocalciferol (ERGOCALCIFEROL) 50,000 unit capsule   8. Epigastric abdominal pain R10.13 789.06 Omeprazole delayed release (PRILOSEC D/R) 20 mg tablet   9. Osteoarthritis of spine with radiculopathy, lumbar region M47.26 721.3    10. Decreased activities of daily living (ADL) Z78.9 V49.89 REFERRAL TO HOME HEALTH   11. Rash R21 782.1 hydrocortisone (CORTAID) 0.5 % topical cream     Patient Instructions        High Cholesterol: Care Instructions  Your Care Instructions  Cholesterol is a type of fat in your blood. It is needed for many body functions, such as making new cells. Cholesterol is made by your body. It also comes from food you eat. High cholesterol means that you have too much of the fat in your blood. This raises your risk of a heart attack and stroke. LDL and HDL are part of your total cholesterol.  LDL is the \"bad\" cholesterol. High LDL can raise your risk for heart disease, heart attack, and stroke. HDL is the \"good\" cholesterol. It helps clear bad cholesterol from the body. High HDL is linked with a lower risk of heart disease, heart attack, and stroke. Your cholesterol levels help your doctor find out your risk for having a heart attack or stroke. You and your doctor can talk about whether you need to lower your risk and what treatment is best for you. A heart-healthy lifestyle along with medicines can help lower your cholesterol and your risk. The way you choose to lower your risk will depend on how high your risk is for heart attack and stroke. It will also depend on how you feel about taking medicines. Follow-up care is a key part of your treatment and safety. Be sure to make and go to all appointments, and call your doctor if you are having problems. It's also a good idea to know your test results and keep a list of the medicines you take. How can you care for yourself at home? · Eat a variety of foods every day. Good choices include fruits, vegetables, whole grains (like oatmeal), dried beans and peas, nuts and seeds, soy products (like tofu), and fat-free or low-fat dairy products. · Replace butter, margarine, and hydrogenated or partially hydrogenated oils with olive and canola oils. (Canola oil margarine without trans fat is fine.)  · Replace red meat with fish, poultry, and soy protein (like tofu). · Limit processed and packaged foods like chips, crackers, and cookies. · Bake, broil, or steam foods. Don't lehman them. · Be physically active. Get at least 30 minutes of exercise on most days of the week. Walking is a good choice. You also may want to do other activities, such as running, swimming, cycling, or playing tennis or team sports. · Stay at a healthy weight or lose weight by making the changes in eating and physical activity listed above.  Losing just a small amount of weight, even 5 to 10 pounds, can reduce your risk for having a heart attack or stroke. · Do not smoke. When should you call for help? Watch closely for changes in your health, and be sure to contact your doctor if:  · You need help making lifestyle changes. · You have questions about your medicine. Where can you learn more? Go to http://alley-serg.info/. Enter T358 in the search box to learn more about \"High Cholesterol: Care Instructions. \"  Current as of: January 27, 2016  Content Version: 11.2  © 3029-2654 [employer]. Care instructions adapted under license by [employer] (which disclaims liability or warranty for this information). If you have questions about a medical condition or this instruction, always ask your healthcare professional. [address] any warranty or liability for your use of this information. Follow-up Disposition:  Return in about 3 months (around 8/3/2017), or if symptoms worsen or fail to improve, for routine follow up.

## 2017-05-03 NOTE — PATIENT INSTRUCTIONS

## 2017-05-04 RX ORDER — INSULIN ASPART 100 [IU]/ML
INJECTION, SOLUTION INTRAVENOUS; SUBCUTANEOUS
Qty: 3 PEN | Refills: 10 | Status: SHIPPED | OUTPATIENT
Start: 2017-05-04 | End: 2017-07-20 | Stop reason: SDUPTHER

## 2017-05-11 DIAGNOSIS — G89.29 CHRONIC RADICULAR PAIN OF LOWER BACK: ICD-10-CM

## 2017-05-11 DIAGNOSIS — M54.16 CHRONIC RADICULAR PAIN OF LOWER BACK: ICD-10-CM

## 2017-05-11 DIAGNOSIS — R26.89 DECREASED MOBILITY: ICD-10-CM

## 2017-05-11 DIAGNOSIS — E11.610 DIABETIC NEUROPATHIC ARTHRITIS (HCC): Primary | ICD-10-CM

## 2017-05-24 ENCOUNTER — DOCUMENTATION ONLY (OUTPATIENT)
Dept: FAMILY MEDICINE CLINIC | Age: 62
End: 2017-05-24

## 2017-06-06 ENCOUNTER — OFFICE VISIT (OUTPATIENT)
Dept: FAMILY MEDICINE CLINIC | Age: 62
End: 2017-06-06

## 2017-06-06 VITALS
SYSTOLIC BLOOD PRESSURE: 127 MMHG | HEIGHT: 63 IN | HEART RATE: 92 BPM | BODY MASS INDEX: 34.02 KG/M2 | DIASTOLIC BLOOD PRESSURE: 75 MMHG | OXYGEN SATURATION: 95 % | WEIGHT: 192 LBS | TEMPERATURE: 97.3 F | RESPIRATION RATE: 18 BRPM

## 2017-06-06 DIAGNOSIS — I10 HYPERTENSION, WELL CONTROLLED: ICD-10-CM

## 2017-06-06 DIAGNOSIS — Z12.31 ENCOUNTER FOR SCREENING MAMMOGRAM FOR BREAST CANCER: ICD-10-CM

## 2017-06-06 LAB
GLUCOSE POC: NORMAL MG/DL
HBA1C MFR BLD HPLC: 10.3 %

## 2017-06-06 NOTE — PROGRESS NOTES
Chief Complaint   Patient presents with    Diabetes     pre-op     HPI:  # [id number]  Veronique Way is a 64 y.o. female presents accompanied by daughter for pre-op evaluation. Patient has a left eye surgery scheduled 6/19/2017 at [facility] with Dr. Sally Linton. Blood pressure is well controlled in clinic today. Diabetes is poorly controlled, A1C= 10.3%, BS is very high to the point that it does not register om the meter. Current diabetic medications are Novolin 50 unites in AM, 30 unites in PM and metformin 1g twice a day. She has not taken medications today. I have made clear that if the sugar stays elevated she will not have the eye surgery. Review of Systems  As per hpi    Past Medical History:   Diagnosis Date    Arthritis     shoulders and knees.  Diabetes ([address]     insulin dependent     Hypercholesterolemia     Hypertension      Past Surgical History:   Procedure Laterality Date    HX GYN      hysterectomy     Social History    Marital status:      Spouse name: N/A    Number of children: N/A    Years of education: N/A     Social History Main Topics    Smoking status: Current Every Day Smoker     Packs/day: 2.00     Years: 3.00    Smokeless tobacco: Never Used    Alcohol use No    Drug use: No    Sexual activity: Not Asked     Current Outpatient Prescriptions   Medication Sig Dispense Refill    insulin aspart (NOVOLOG FLEXPEN) 100 unit/mL inpn 50 units in am, 30 units in evening with meal  Indications: type 2 diabetes mellitus 3 Pen 10    lovastatin (MEVACOR) 20 mg tablet Take 1 Tab by mouth nightly. Indications: mixed hyperlipidemia 30 Tab 5    buPROPion XL (WELLBUTRIN XL) 150 mg tablet Take 1 Tab by mouth every morning. Indications: ANXIETY WITH DEPRESSION 30 Tab 5    gabapentin (NEURONTIN) 100 mg capsule Take 1 Cap by mouth three (3) times daily. 90 Cap 3    lisinopril (PRINIVIL, ZESTRIL) 5 mg tablet Take 1 Tab by mouth daily.  Indications: DIABETIC NEPHROPATHY, hypertension 30 Tab 5    ergocalciferol (ERGOCALCIFEROL) 50,000 unit capsule Take 1 Cap by mouth every seven (7) days. 12 Cap 3    loratadine (CLARITIN) 10 mg tablet Take 1 Tab by mouth daily as needed for Allergies or Itching. 30 Tab 5    glucose blood VI test strips (FREESTYLE INSULINX) strip Check sugar 3 times 90 Strip 11    Omeprazole delayed release (PRILOSEC D/R) 20 mg tablet Take 1 Tab by mouth daily. Indications: gastroesophageal reflux disease 30 Tab 5    metFORMIN (GLUCOPHAGE) 1,000 mg tablet Take 1 Tab by mouth two (2) times daily (with meals). Indications: type 2 diabetes mellitus 60 Tab 7    Blood-Glucose Meter (FREESTYLE FLASH SYSTEM) monitoring kit Use as directed 1 Kit 0    Lancets misc Check sugar 2 times a day 1 Each 11    hydrocortisone (CORTAID) 0.5 % topical cream Apply  to affected area two (2) times a day. use thin layer 30 g 0    polyethylene glycol (MIRALAX) 17 gram packet Take 1 Packet by mouth every Monday, Thursday, Saturday.  30 Packet 5     No Known Allergies    Objective:  Visit Vitals    /75 (BP 1 Location: Left arm, BP Patient Position: Sitting)    Pulse 92    Temp 97.3 °F (36.3 °C) (Oral)    Resp 18    Ht 5' 3\" (1.6 m)    Wt 192 lb (87.1 kg)    LMP  (LMP Unknown)    SpO2 95%    BMI 34.01 kg/m2     Physical Exam:   General appearance - alert, well appearing, in no distress  Mental status - alert, oriented to person, place, and time  EYE-PERRL, EOMI  Neck - supple, no significant adenopathy   Chest - clear to auscultation, no wheezes, rales or rhonchi  Heart - normal rate, regular rhythm, normal blood pressure  Abdomen - soft, nontender, nondistended, no organomegaly  Ext-peripheral pulses normal, no pedal edema  Neuro -alert, oriented, normal speech, no focal findings     AMB POC HEMOGLOBIN A1C   Result Value Ref Range    Hemoglobin A1c (POC) 10.3 %   AMB POC GLUCOSE BLOOD, BY GLUCOSE MONITORING DEVICE   Result Value Ref Range    Glucose POC [facility] mg/dL     Assessment/Plan:    ICD-10-CM ICD-9-CM    1. DM type 2, uncontrolled, with neuropathy ([facility]) F08.53 416.19 METABOLIC PANEL, COMPREHENSIVE    E11.65 357.2 CBC WITH AUTOMATED DIFF      AMB POC HEMOGLOBIN A1C      AMB POC GLUCOSE BLOOD, BY GLUCOSE MONITORING DEVICE   2. Encounter for screening mammogram for breast cancer Z12.31 V76.12 [facility] BI SCREENING INCL CAD   3. Hypertension, well controlled Y17 236.7 METABOLIC PANEL, COMPREHENSIVE     Patient Instructions   Medication:  NOVOLIn Insulin 50 unite AM 30 unites PM  Metformin 1 Tab in AM and 1 tab in PM with meal     Follow-up Disposition:  Return in about 7 days (around 2017), or follow up diabetes treatment.

## 2017-06-06 NOTE — MR AVS SNAPSHOT
Visit Information Date & Time Provider Department Dept. Phone Encounter #  
 6/6/2017  2:45 PM Ernie Escobar MD [facility] at [address] [id number] Follow-up Instructions Return in about 7 days (around 6/13/2017), or follow up diabetes treatment. Your Appointments 6/20/2017  3:00 PM  
New Patient with Fantasma Bello MD  
[facility] OFFICE-ANN ([address]) Appt Note: uncontrolled diabetes [address] [place] [id number] [phone] [phone] Upcoming Health Maintenance Date Due Pneumococcal 19-64 Medium Risk (1 of 1 - PPSV23) 7/1/1974 FOOT EXAM Q1 6/9/2016 EYE EXAM RETINAL OR DILATED Q1 6/9/2016 FOBT Q 1 YEAR AGE 50-75 6/9/2016 BREAST CANCER SCRN MAMMOGRAM 5/6/2017 INFLUENZA AGE 9 TO ADULT 8/1/2017 HEMOGLOBIN A1C Q6M 11/3/2017 MICROALBUMIN Q1 2/15/2018 LIPID PANEL Q1 2/15/2018 PAP AKA CERVICAL CYTOLOGY 6/9/2018 DTaP/Tdap/Td series (2 - Td) 6/6/2027 Allergies as of 6/6/2017  Review Complete On: 6/6/2017 By: Ernie Escobar MD  
 No Known Allergies Current Immunizations  Never Reviewed No immunizations on file. Not reviewed this visit You Were Diagnosed With   
  
 Codes Comments DM type 2, uncontrolled, with neuropathy [facility]    -  Primary ICD-10-CM: E11.40, E11.65 ICD-9-CM: 250.62, 357.2 Encounter for screening mammogram for breast cancer     ICD-10-CM: Z12.31 
ICD-9-CM: V76.12 Hypertension, well controlled     ICD-10-CM: I10 
ICD-9-CM: 401.9 Vitals BP Pulse Temp Resp Height(growth percentile) Weight(growth percentile) 127/75 (BP 1 Location: Left arm, BP Patient Position: Sitting) 92 97.3 °F (36.3 °C) (Oral) 18 5' 3\" (1.6 m) 192 lb (87.1 kg) LMP SpO2 BMI OB Status Smoking Status (LMP Unknown) 95% 34.01 kg/m2 Hysterectomy Current Every Day Smoker Vitals History BMI and BSA Data Body Mass Index Body Surface Area 34.01 kg/m 2 1.97 m 2 Preferred Pharmacy Pharmacy Name Phone [id number] [place], [address] [facility] [phone] Your Updated Medication List  
  
   
This list is accurate as of: 6/6/17  2:50 PM.  Always use your most recent med list.  
  
  
  
  
 Blood-Glucose Meter monitoring kit Commonly known as:  Freestyle Flash System Use as directed buPROPion  mg tablet Commonly known as:  Gaycorina Rouleau Take 1 Tab by mouth every morning. Indications: ANXIETY WITH DEPRESSION  
  
 ergocalciferol 50,000 unit capsule Commonly known as:  ERGOCALCIFEROL Take 1 Cap by mouth every seven (7) days. gabapentin 100 mg capsule Commonly known as:  NEURONTIN Take 1 Cap by mouth three (3) times daily. glucose blood VI test strips strip Commonly known as:  FREESTYLE INSULINX Check sugar 3 times  
  
 hydrocortisone 0.5 % topical cream  
Commonly known as:  CORTAID Apply  to affected area two (2) times a day. use thin layer  
  
 insulin aspart 100 unit/mL Inpn Commonly known as:  Meredith Spurling 50 units in am, 30 units in evening with meal  Indications: type 2 diabetes mellitus Lancets Misc Check sugar 2 times a day  
  
 lisinopril 5 mg tablet Commonly known as:  Hellen Janelle Take 1 Tab by mouth daily. Indications: DIABETIC NEPHROPATHY, hypertension  
  
 loratadine 10 mg tablet Commonly known as:  Tony Erazo Take 1 Tab by mouth daily as needed for Allergies or Itching. lovastatin 20 mg tablet Commonly known as:  MEVACOR Take 1 Tab by mouth nightly. Indications: mixed hyperlipidemia  
  
 metFORMIN 1,000 mg tablet Commonly known as:  GLUCOPHAGE Take 1 Tab by mouth two (2) times daily (with meals). Indications: type 2 diabetes mellitus Omeprazole delayed release 20 mg tablet Commonly known as:  PRILOSEC D/R  
 Take 1 Tab by mouth daily. Indications: gastroesophageal reflux disease  
  
 polyethylene glycol 17 gram packet Commonly known as:  Debbie Marrow Take 1 Packet by mouth every Monday, Thursday, Saturday. We Performed the Following CBC WITH AUTOMATED DIFF [90226 CPT(R)] METABOLIC PANEL, COMPREHENSIVE [28686 CPT(R)] Follow-up Instructions Return in about 7 days (around 2017), or follow up diabetes treatment. To-Do List   
 2017 Imaging:  [facility] M[facility] BI SCREENING INCL CAD Patient Instructions Medication: NOVOLIn Insulin 50 unite AM 30 unites PM 
Metformin 1 Tab in AM and 1 tab in PM with meal 
 
  
Introducing [facility]! [facility] introduces [employer] patient portal. Now you can access parts of your medical record, email your doctor's office, and request medication refills online. 1. In your internet browser, go to [email or web address] 2. Click on the First Time User? Click Here link in the Sign In box. You will see the New Member Sign Up page. 3. Enter your [employer] Access Code exactly as it appears below. You will not need to use this code after youve completed the sign-up process. If you do not sign up before the expiration date, you must request a new code. · [employer] Access Code: [email or web address] Expires: 2017  2:50 PM 
 
4. Enter the last four digits of your Social Security Number (xxxx) and Date of Birth (mm/dd/yyyy) as indicated and click Submit. You will be taken to the next sign-up page. 5. Create a [employer] (?????)t ID. This will be your [employer] login ID and cannot be changed, so think of one that is secure and easy to remember. 6. Create a [employer] password. You can change your password at any time. 7. Enter your Password Reset Question and Answer. This can be used at a later time if you forget your password. 8. Enter your e-mail address.  You will receive e-mail notification when new information is available in [employer]. 9. Click Sign Up. You can now view and download portions of your medical record. 10. Click the Download Summary menu link to download a portable copy of your medical information. If you have questions, please visit the Frequently Asked Questions section of the [employer] website. Remember, [employer] is NOT to be used for urgent needs. For medical emergencies, dial 911. Now available from your iPhone and Android! Please provide this summary of care documentation to your next provider. Your primary care clinician is listed as Paul Everett. If you have any questions after today's visit, please call [phone].

## 2017-06-06 NOTE — PATIENT INSTRUCTIONS
Medication:  NOVOLIn Insulin 50 unite AM 30 unites PM  Metformin 1 Tab in AM and 1 tab in PM with meal

## 2017-06-08 LAB
ALBUMIN SERPL-MCNC: 4.2 G/DL (ref 3.6–4.8)
ALBUMIN/GLOB SERPL: 1.4 {RATIO} (ref 1.2–2.2)
ALP SERPL-CCNC: 84 IU/L (ref 39–117)
ALT SERPL-CCNC: 18 IU/L (ref 0–32)
AST SERPL-CCNC: 16 IU/L (ref 0–40)
BASOPHILS # BLD AUTO: 0 X10E3/UL (ref 0–0.2)
BASOPHILS NFR BLD AUTO: 0 %
BILIRUB SERPL-MCNC: 0.4 MG/DL (ref 0–1.2)
BUN SERPL-MCNC: 20 MG/DL (ref 8–27)
BUN/CREAT SERPL: 24 (ref 12–28)
CALCIUM SERPL-MCNC: 9.3 MG/DL (ref 8.7–10.3)
CHLORIDE SERPL-SCNC: 96 MMOL/L (ref 96–106)
CO2 SERPL-SCNC: 24 MMOL/L (ref 18–29)
CREAT SERPL-MCNC: 0.82 MG/DL (ref 0.57–1)
EOSINOPHIL # BLD AUTO: 0.3 X10E3/UL (ref 0–0.4)
EOSINOPHIL NFR BLD AUTO: 3 %
ERYTHROCYTE [DISTWIDTH] IN BLOOD BY AUTOMATED COUNT: 14.2 % (ref 12.3–15.4)
GLOBULIN SER CALC-MCNC: 2.9 G/DL (ref 1.5–4.5)
GLUCOSE SERPL-MCNC: 380 MG/DL (ref 65–99)
HCT VFR BLD AUTO: 42.3 % (ref 34–46.6)
HGB BLD-MCNC: 14.1 G/DL (ref 11.1–15.9)
IMM GRANULOCYTES # BLD: 0 X10E3/UL (ref 0–0.1)
IMM GRANULOCYTES NFR BLD: 0 %
LYMPHOCYTES # BLD AUTO: 3.5 X10E3/UL (ref 0.7–3.1)
LYMPHOCYTES NFR BLD AUTO: 39 %
MCH RBC QN AUTO: 29.7 PG (ref 26.6–33)
MCHC RBC AUTO-ENTMCNC: 33.3 G/DL (ref 31.5–35.7)
MCV RBC AUTO: 89 FL (ref 79–97)
MONOCYTES # BLD AUTO: 0.7 X10E3/UL (ref 0.1–0.9)
MONOCYTES NFR BLD AUTO: 8 %
NEUTROPHILS # BLD AUTO: 4.3 X10E3/UL (ref 1.4–7)
NEUTROPHILS NFR BLD AUTO: 50 %
PLATELET # BLD AUTO: 281 X10E3/UL (ref 150–379)
POTASSIUM SERPL-SCNC: 5.1 MMOL/L (ref 3.5–5.2)
PROT SERPL-MCNC: 7.1 G/DL (ref 6–8.5)
RBC # BLD AUTO: 4.75 X10E6/UL (ref 3.77–5.28)
SODIUM SERPL-SCNC: 135 MMOL/L (ref 134–144)
WBC # BLD AUTO: 8.9 X10E3/UL (ref 3.4–10.8)

## 2017-06-09 ENCOUNTER — HOSPITAL ENCOUNTER (OUTPATIENT)
Dept: MAMMOGRAPHY | Age: 62
Discharge: HOME | End: 2017-06-09
Attending: INTERNAL MEDICINE
Payer: MEDICAID

## 2017-06-09 DIAGNOSIS — Z12.31 ENCOUNTER FOR SCREENING MAMMOGRAM FOR BREAST CANCER: ICD-10-CM

## 2017-06-09 PROCEDURE — 77067 SCR MAMMO BI INCL CAD: CPT

## 2017-06-13 ENCOUNTER — TELEPHONE (OUTPATIENT)
Dept: FAMILY MEDICINE CLINIC | Age: 62
End: 2017-06-13

## 2017-06-13 NOTE — TELEPHONE ENCOUNTER
Pt is calling - through an  at her home   She missed yesterday's apptmnt b/c of no ride   Wants to r/s for 6/14 or 6/15, which is not available   States she has eye surgery on 6-19-17     Young man interpreting, is going to ask her to r/s and call us for another apptmnt     Pls call to advise at [phone]

## 2017-06-15 ENCOUNTER — OFFICE VISIT (OUTPATIENT)
Dept: FAMILY MEDICINE CLINIC | Age: 62
End: 2017-06-15

## 2017-06-15 VITALS
TEMPERATURE: 97.1 F | OXYGEN SATURATION: 96 % | WEIGHT: 192.6 LBS | DIASTOLIC BLOOD PRESSURE: 66 MMHG | RESPIRATION RATE: 18 BRPM | HEIGHT: 63 IN | BODY MASS INDEX: 34.12 KG/M2 | SYSTOLIC BLOOD PRESSURE: 112 MMHG | HEART RATE: 87 BPM

## 2017-06-15 DIAGNOSIS — I10 HYPERTENSION, WELL CONTROLLED: ICD-10-CM

## 2017-06-15 DIAGNOSIS — Z01.818 PRE-OP EVALUATION: ICD-10-CM

## 2017-06-15 DIAGNOSIS — E11.36 CATARACT DUE TO DM (HCC): ICD-10-CM

## 2017-06-15 DIAGNOSIS — E55.9 HYPOVITAMINOSIS D: ICD-10-CM

## 2017-06-15 DIAGNOSIS — E78.00 HYPERCHOLESTEROLEMIA: ICD-10-CM

## 2017-06-15 LAB — GLUCOSE POC: 343 MG/DL

## 2017-06-15 NOTE — PROGRESS NOTES
Chief Complaint   Patient presents with   [address] [place] [address] Diabetes     [facility] [place] # [id number], # [id number]  Ector Harkins is a 64 y.o. female with diabetes type 2, hypertension was seen 1-2 weeks prior for pre-op evaluation for a cataract removal surgery. Blood pressure is well controlled. However, blood sugar is elevated today at mid 300 mg/dl, A1c is 10%   Patient is not compliant with treatment regimen and follow up. Because of these lab findings she can not be cleared for sugery until her blood sugar is controlled. I have discussed this with pt and daughter. I will inform Dr. Elif Cisneros office about the finding    Review of Systems  As per hpi    Past Medical History:   Diagnosis Date    Arthritis     shoulders and knees.  Diabetes ([address]     insulin dependent     Hypercholesterolemia     Hypertension      Past Surgical History:   Procedure Laterality Date    HX GYN      hysterectomy     Social History     Social History    Marital status:      Spouse name: N/A    Number of children: N/A    Years of education: N/A     Social History Main Topics    Smoking status: Current Every Day Smoker     Packs/day: 2.00     Years: 3.00    Smokeless tobacco: Never Used    Alcohol use No    Drug use: No    Sexual activity: Not Asked     Other Topics Concern    None     Social History Narrative     Family History   Problem Relation Age of Onset    Diabetes Brother      Current Outpatient Prescriptions   Medication Sig Dispense Refill    insulin aspart (NOVOLOG FLEXPEN) 100 unit/mL inpn 50 units in am, 30 units in evening with meal  Indications: type 2 diabetes mellitus 3 Pen 10    lovastatin (MEVACOR) 20 mg tablet Take 1 Tab by mouth nightly. Indications: mixed hyperlipidemia 30 Tab 5    buPROPion XL (WELLBUTRIN XL) 150 mg tablet Take 1 Tab by mouth every morning. Indications: ANXIETY WITH DEPRESSION 30 Tab 5    gabapentin (NEURONTIN) 100 mg capsule Take 1 Cap by mouth three (3) times daily.  90 Cap 3    lisinopril (PRINIVIL, ZESTRIL) 5 mg tablet Take 1 Tab by mouth daily. Indications: DIABETIC NEPHROPATHY, hypertension 30 Tab 5    ergocalciferol (ERGOCALCIFEROL) 50,000 unit capsule Take 1 Cap by mouth every seven (7) days. 12 Cap 3    loratadine (CLARITIN) 10 mg tablet Take 1 Tab by mouth daily as needed for Allergies or Itching. 30 Tab 5    glucose blood VI test strips (FREESTYLE INSULINX) strip Check sugar 3 times 90 Strip 11    Omeprazole delayed release (PRILOSEC D/R) 20 mg tablet Take 1 Tab by mouth daily. Indications: gastroesophageal reflux disease 30 Tab 5    metFORMIN (GLUCOPHAGE) 1,000 mg tablet Take 1 Tab by mouth two (2) times daily (with meals). Indications: type 2 diabetes mellitus 60 Tab 7    Blood-Glucose Meter (FREESTYLE FLASH SYSTEM) monitoring kit Use as directed 1 Kit 0    Lancets misc Check sugar 2 times a day 1 Each 11    hydrocortisone (CORTAID) 0.5 % topical cream Apply  to affected area two (2) times a day. use thin layer 30 g 0    polyethylene glycol (MIRALAX) 17 gram packet Take 1 Packet by mouth every Monday, Thursday, Saturday.  30 Packet 5     No Known Allergies    Objective:  Visit Vitals    /66 (BP 1 Location: Right arm, BP Patient Position: Sitting)    Pulse 87    Temp 97.1 °F (36.2 °C) (Oral)    Resp 18    Ht 5' 3\" (1.6 m)    Wt 192 lb 9.6 oz (87.4 kg)    LMP  (LMP Unknown)    SpO2 96%    BMI 34.12 kg/m2     Physical Exam:   General appearance - alert, well appearing, in no distress  Neck - supple, no significant adenopathy   Chest - clear to auscultation, no wheezes, rales or rhonchi  Heart - normal rate, regular rhythm, normal blood pressure  Abdomen - soft, nontender, nondistended, no organomegaly  Ext-peripheral pulses normal, no pedal edema  Neuro -alert, oriented, normal speech, no focal findings     Results for orders placed or performed in visit on 06/15/17   AMB POC GLUCOSE BLOOD, BY GLUCOSE MONITORING DEVICE   Result Value Ref Range    Glucose  mg/dL Assessment/Plan:    ICD-10-CM ICD-9-CM    1. DM type 2, uncontrolled, with neuropathy (HCC) E11.40 250.62 AMB POC GLUCOSE BLOOD, BY GLUCOSE MONITORING DEVICE    E11.65 357.2    2. Hypercholesterolemia E78.00 272.0    3. Hypovitaminosis D E55.9 268.9    4. Cataract due to DM ([facility] E11.36 250.50 REFERRAL TO OPHTHALMOLOGY     366.41    5. Pre-op evaluation Z01.818 V72.84    6. Hypertension, well controlled I10 401.9      Patient Instructions   Diabetes medications  NOVOLIn Insulin 55 unites AM 30 unites PM with meal  Metformin 1 Tab in morning and 1 tab in evening    I informed Dr. Abdi Jj office that your sugar is not controlled. Your eye surgery is cancelled until your blood sugar is below 200 mg/dl  So also follow up with the endocrinologist to help adjust medication. Next surgery opening     Follow-up Disposition:  Return in about 2 weeks (around 2017) for f/u treatment of diabetes treatment.

## 2017-06-15 NOTE — MR AVS SNAPSHOT
Visit Information Date & Time Provider Department Dept. Phone Encounter #  
 6/15/2017 10:00 AM Javi Toney MD [address] at [address] [place] [address] Road [id number] Follow-up Instructions Return in about 2 weeks (around 6/29/2017) for f/u treatment of diabetes treatment. Your Appointments 6/20/2017  3:00 PM  
New Patient with Daniela Hand MD  
 [facility] ([facility]) Appt Note: uncontrolled diabetes [address] [place] [address] [id number] [phone] [phone] 7/14/2017  1:30 PM  
New Patient with Daniela Hand MD  
[facility]) Appt Note: New patient for Diabetes (dr Jayna Aquino) [address] [place] [id number] [address] Upcoming Health Maintenance Date Due Pneumococcal 19-64 Medium Risk (1 of 1 - PPSV23) 7[birth year]1/1974 FOOT EXAM Q1 6/9/2016 EYE EXAM RETINAL OR DILATED Q1 6/9/2016 FOBT Q 1 YEAR AGE 50-75 6/9/2016 INFLUENZA AGE 9 TO ADULT 8/1/2017 HEMOGLOBIN A1C Q6M 12/6/2017 MICROALBUMIN Q1 2/15/2018 LIPID PANEL Q1 2/15/2018 PAP AKA CERVICAL CYTOLOGY 6/9/2018 BREAST CANCER SCRN MAMMOGRAM 6/9/2019 DTaP/Tdap/Td series (2 - Td) 6/6/2027 Allergies as of 6/15/2017  Review Complete On: 6/15/2017 By: Javi Toney MD  
 No Known Allergies Current Immunizations  Never Reviewed No immunizations on file. Not reviewed this visit You Were Diagnosed With   
  
 Codes Comments DM type 2, uncontrolled, with neuropathy ([facility]    -  Primary ICD-10-CM: E11.40, E11.65 ICD-9-CM: 250.62, 357.2 Hypercholesterolemia     ICD-10-CM: E78.00 ICD-9-CM: 272.0 Hypovitaminosis D     ICD-10-CM: E55.9 ICD-9-CM: 268.9  Cataract due to DM [facility])     ICD-10-CM: E11.36 
 ICD-9-CM: 250.50, 366.41 Pre-op evaluation     ICD-10-CM: T53.089 ICD-9-CM: V72.84 Vitals BP Pulse Temp Resp Height(growth percentile) Weight(growth percentile) 112/66 (BP 1 Location: Right arm, BP Patient Position: Sitting) 87 97.1 °F (36.2 °C) (Oral) 18 5' 3\" (1.6 m) 192 lb 9.6 oz (87.4 kg) LMP SpO2 BMI OB Status Smoking Status (LMP Unknown) 96% 34.12 kg/m2 Hysterectomy Current Every Day Smoker Vitals History BMI and BSA Data Body Mass Index Body Surface Area  
 34.12 kg/m 2 1.97 m 2 Preferred Pharmacy Pharmacy Name Phone [id number], [address] [phone] Your Updated Medication List  
  
   
This list is accurate as of: 6/15/17 11:39 AM.  Always use your most recent med list.  
  
  
  
  
 Blood-Glucose Meter monitoring kit Commonly known as:  Freestyle Flash System Use as directed buPROPion  mg tablet Commonly known as:  Misha Riley Take 1 Tab by mouth every morning. Indications: ANXIETY WITH DEPRESSION  
  
 ergocalciferol 50,000 unit capsule Commonly known as:  ERGOCALCIFEROL Take 1 Cap by mouth every seven (7) days. gabapentin 100 mg capsule Commonly known as:  NEURONTIN Take 1 Cap by mouth three (3) times daily. glucose blood VI test strips strip Commonly known as:  FREESTYLE INSULINX Check sugar 3 times  
  
 hydrocortisone 0.5 % topical cream  
Commonly known as:  CORTAID Apply  to affected area two (2) times a day. use thin layer  
  
 insulin aspart 100 unit/mL Inpn Commonly known as:  Mignon Spurling 50 units in am, 30 units in evening with meal  Indications: type 2 diabetes mellitus Lancets Misc Check sugar 2 times a day  
  
 lisinopril 5 mg tablet Commonly known as:  Hellen Luis Take 1 Tab by mouth daily. Indications: DIABETIC NEPHROPATHY, hypertension  
  
 loratadine 10 mg tablet Commonly known as:  Al Alvine Take 1 Tab by mouth daily as needed for Allergies or Itching. lovastatin 20 mg tablet Commonly known as:  MEVACOR Take 1 Tab by mouth nightly. Indications: mixed hyperlipidemia  
  
 metFORMIN 1,000 mg tablet Commonly known as:  GLUCOPHAGE Take 1 Tab by mouth two (2) times daily (with meals). Indications: type 2 diabetes mellitus Omeprazole delayed release 20 mg tablet Commonly known as:  PRILOSEC D/R Take 1 Tab by mouth daily. Indications: gastroesophageal reflux disease  
  
 polyethylene glycol 17 gram packet Commonly known as:  Amemakenna Muirall Take 1 Packet by mouth every Monday, Thursday, Saturday. We Performed the Following AMB POC GLUCOSE BLOOD, BY GLUCOSE MONITORING DEVICE [43371 CPT(R)] REFERRAL TO OPHTHALMOLOGY [REF57 Custom] Follow-up Instructions Return in about 2 weeks (around 2017) for f/u treatment of diabetes treatment. Referral Information Referral ID Referred By Referred To  
  
 [id number] [facility] Geovanny Tirado MD   
   [address] [place] [address] Phone: [phone] Fax: [phone] Visits Status Start Date End Date 1 New Request 6/15/17 6/15/18 If your referral has a status of pending review or denied, additional information will be sent to support the outcome of this decision. Patient Instructions Diabetes medications NOVOLIn Insulin 55 unites AM 30 unites PM with meal 
Metformin 1 Tab in morning and 1 tab in evening I informed Dr. Mccarty Govern office that your sugar is not controlled. Your eye surgery is cancelled until your blood sugar is below 200 mg/dl So also follow up with the endocrinologist to help adjust medication. Next surgery opening Introducing [facility]!    
 Mat Watts introduces [employer] patient portal. Now you can access parts of your medical record, email your doctor's office, and request medication refills online. 1. In your internet browser, go to [email or web address] 2. Click on the First Time User? Click Here link in the Sign In box. You will see the New Member Sign Up page. 3. Enter your [employer] Access Code exactly as it appears below. You will not need to use this code after youve completed the sign-up process. If you do not sign up before the expiration date, you must request a new code. · [employer] Access Code: [email or web address] Expires: 9/4/2017  2:50 PM 
 
4. Enter the last four digits of your Social Security Number (xxxx) and Date of Birth (mm/dd/yyyy) as indicated and click Submit. You will be taken to the next sign-up page. 5. Create a [employer] ID. This will be your [employer] login ID and cannot be changed, so think of one that is secure and easy to remember. 6. Create a [employer] password. You can change your password at any time. 7. Enter your Password Reset Question and Answer. This can be used at a later time if you forget your password. 8. Enter your e-mail address. You will receive e-mail notification when new information is available in [address] 9. Click Sign Up. You can now view and download portions of your medical record. 10. Click the Download Summary menu link to download a portable copy of your medical information. If you have questions, please visit the Frequently Asked Questions section of the [employer] website. Remember, [employer] is NOT to be used for urgent needs. For medical emergencies, dial 911. Now available from your iPhone and Android! Please provide this summary of care documentation to your next provider. Your primary care clinician is listed as Vidhi Chris. If you have any questions after today's visit, please call [phone].

## 2017-06-15 NOTE — PATIENT INSTRUCTIONS
Diabetes medications  NOVOLIn Insulin 55 unites AM 30 unites PM with meal  Metformin 1 Tab in morning and 1 tab in evening    I informed Dr. Ricardo Diaz office that your sugar is not controlled. Your eye surgery is cancelled until your blood sugar is below 200 mg/dl  So also follow up with the endocrinologist to help adjust medication.   Next surgery opening

## 2017-07-20 ENCOUNTER — OFFICE VISIT (OUTPATIENT)
Dept: FAMILY MEDICINE CLINIC | Age: 62
End: 2017-07-20

## 2017-07-20 VITALS
OXYGEN SATURATION: 95 % | DIASTOLIC BLOOD PRESSURE: 61 MMHG | TEMPERATURE: 97.1 F | BODY MASS INDEX: 34.52 KG/M2 | WEIGHT: 194.8 LBS | HEART RATE: 82 BPM | HEIGHT: 63 IN | SYSTOLIC BLOOD PRESSURE: 107 MMHG | RESPIRATION RATE: 18 BRPM

## 2017-07-20 LAB
GLUCOSE POC: 220 MG/DL
HBA1C MFR BLD HPLC: 9.7 %

## 2017-07-20 RX ORDER — LANCETS
EACH MISCELLANEOUS
Qty: 100 EACH | Refills: 11 | Status: SHIPPED | OUTPATIENT
Start: 2017-07-20 | End: 2017-12-20 | Stop reason: ALTCHOICE

## 2017-07-20 RX ORDER — INSULIN ASPART 100 [IU]/ML
INJECTION, SOLUTION INTRAVENOUS; SUBCUTANEOUS
Qty: 10 PEN | Refills: 11 | Status: SHIPPED | OUTPATIENT
Start: 2017-07-20 | End: 2017-12-20 | Stop reason: SDUPTHER

## 2017-07-20 RX ORDER — METFORMIN HYDROCHLORIDE 1000 MG/1
1000 TABLET ORAL 2 TIMES DAILY WITH MEALS
Qty: 60 TAB | Refills: 7 | Status: SHIPPED | OUTPATIENT
Start: 2017-07-20 | End: 2017-12-20 | Stop reason: SDUPTHER

## 2017-07-20 NOTE — PATIENT INSTRUCTIONS

## 2017-07-20 NOTE — PROGRESS NOTES
1. Have you been to the ER, urgent care clinic since your last visit? Hospitalized since your last visit? No    2. Have you seen or consulted any other health care providers outside of the [address] [place] [address] since your last visit? Include any pap smears or colon screening.  No       Pt is here for follow up on diabetes, states it was 221 this morning

## 2017-07-20 NOTE — PROGRESS NOTES
Chief Complaint   Patient presents with    Diabetes     [id number]  Cristian German is a 58 y.o. female with diabetes type 2, last A1C 0f 10%, BS was 300's, hypertension presents for 4 weeks f/u on treatment of diabetes. Current diabetes medications include metformin 1g 2 X daily and Novolog Pen 50 units AM and 30 units PM.   Due to uncontrolled diabetes eye surgery with Dr. Mazin Hsu was cancelled in June. Patient was referred to endocrinologist.   Appointment is scheduled today at 3pm. Blood sugar today is 220 mg/dl, A1C=9.7%. Review of Systems  As per hpi    Past Medical History:   Diagnosis Date    Arthritis     shoulders and knees.  Diabetes ([address]     insulin dependent     Hypercholesterolemia     Hypertension      Past Surgical History:   Procedure Laterality Date    HX GYN      hysterectomy     Social History    Marital status:      Spouse name: N/A    Number of children: N/A    Years of education: N/A     Social History Main Topics    Smoking status: Current Every Day Smoker     Packs/day: 2.00     Years: 3.00    Smokeless tobacco: Never Used    Alcohol use No    Drug use: No    Sexual activity: Not Asked     Current Outpatient Prescriptions   Medication Sig Dispense Refill    insulin aspart (NOVOLOG FLEXPEN) 100 unit/mL inpn 50 units in am, 30 units in evening with meal  Indications: type 2 diabetes mellitus 3 Pen 10    lovastatin (MEVACOR) 20 mg tablet Take 1 Tab by mouth nightly. Indications: mixed hyperlipidemia 30 Tab 5    buPROPion XL (WELLBUTRIN XL) 150 mg tablet Take 1 Tab by mouth every morning. Indications: ANXIETY WITH DEPRESSION 30 Tab 5    gabapentin (NEURONTIN) 100 mg capsule Take 1 Cap by mouth three (3) times daily. 90 Cap 3    lisinopril (PRINIVIL, ZESTRIL) 5 mg tablet Take 1 Tab by mouth daily. Indications: DIABETIC NEPHROPATHY, hypertension 30 Tab 5    ergocalciferol (ERGOCALCIFEROL) 50,000 unit capsule Take 1 Cap by mouth every seven (7) days.  12 Cap 3    loratadine (CLARITIN) 10 mg tablet Take 1 Tab by mouth daily as needed for Allergies or Itching. 30 Tab 5    glucose blood VI test strips (FREESTYLE INSULINX) strip Check sugar 3 times 90 Strip 11    Omeprazole delayed release (PRILOSEC D/R) 20 mg tablet Take 1 Tab by mouth daily. Indications: gastroesophageal reflux disease 30 Tab 5    metFORMIN (GLUCOPHAGE) 1,000 mg tablet Take 1 Tab by mouth two (2) times daily (with meals). Indications: type 2 diabetes mellitus 60 Tab 7    Blood-Glucose Meter (FREESTYLE FLASH SYSTEM) monitoring kit Use as directed 1 Kit 0    Lancets misc Check sugar 2 times a day 1 Each 11    hydrocortisone (CORTAID) 0.5 % topical cream Apply  to affected area two (2) times a day. use thin layer 30 g 0    polyethylene glycol (MIRALAX) 17 gram packet Take 1 Packet by mouth every Monday, Thursday, Saturday.  30 Packet 5     No Known Allergies    Objective:  Visit Vitals    /61 (BP 1 Location: Right arm, BP Patient Position: Sitting)    Pulse 82    Temp 97.1 °F (36.2 °C) (Oral)    Resp 18    Ht 5' 3\" (1.6 m)    Wt 194 lb 12.8 oz (88.4 kg)    LMP  (LMP Unknown)    SpO2 95%    BMI 34.51 kg/m2     Physical Exam:   General appearance - alert, well appearing, in no distress  Mental status - alert, oriented to person, place, and time  EYE-PERRL, EOMI  Neck - supple, no significant adenopathy   Chest - clear to auscultation, no wheezes, rales or rhonchi  Heart - normal rate, regular rhythm, normal blood pressure  Abdomen - soft, nontender, nondistended, no organomegaly  Ext-peripheral pulses normal, no pedal edema  Neuro -alert, oriented, normal speech, no focal findings   Back-full range of motion, no tenderness, palpable spasm or pain on motion     Recent Results (from the past 12 hour(s))   AMB POC GLUCOSE BLOOD, BY GLUCOSE MONITORING DEVICE    Collection Time: 07/20/17  9:31 AM   Result Value Ref Range    Glucose  mg/dL   AMB POC HEMOGLOBIN A1C    Collection Time: 07/20/17 9:48 AM   Result Value Ref Range    Hemoglobin A1c (POC) 9.7 %     Assessment/Plan:    ICD-10-CM ICD-9-CM    1. DM type 2, uncontrolled, with neuropathy (HCC) E11.40 250.62 AMB POC GLUCOSE BLOOD, BY GLUCOSE MONITORING DEVICE    E11.65 357.2 AMB POC HEMOGLOBIN A1C      metFORMIN (GLUCOPHAGE) 1,000 mg tablet      insulin aspart (NOVOLOG FLEXPEN) 100 unit/mL inpn      glucose blood VI test strips (FREESTYLE INSULINX) strip      Lancets misc     Patient Instructions        Learning About Meal Planning for Diabetes  Why plan your meals? Meal planning can be a key part of managing diabetes. Planning meals and snacks with the right balance of carbohydrate, protein, and fat can help you keep your blood sugar at the target level you set with your doctor. You don't have to eat special foods. You can eat what your family eats, including sweets once in a while. But you do have to pay attention to how often you eat and how much you eat of certain foods. You may want to work with a dietitian or a certified diabetes educator. He or she can give you tips and meal ideas and can answer your questions about meal planning. This health professional can also help you reach a healthy weight if that is one of your goals. What plan is right for you? Your dietitian or diabetes educator may suggest that you start with the plate format or carbohydrate counting. The plate format  The plate format is a simple way to help you manage how you eat. You plan meals by learning how much space each food should take on a plate. Using the plate format helps you spread carbohydrate throughout the day. It can make it easier to keep your blood sugar level within your target range. It also helps you see if you're eating healthy portion sizes. To use the plate format, you put non-starchy vegetables on half your plate. Add meat or meat substitutes on one-quarter of the plate.  Put a grain or starchy vegetable (such as brown rice or a potato) on the final quarter of the plate. You can add a small piece of fruit and some low-fat or fat-free milk or yogurt, depending on your carbohydrate goal for each meal.  Here are some tips for using the plate format:  · Make sure that you are not using an oversized plate. A 9-inch plate is best. Many restaurants use larger plates. · Get used to using the plate format at home. Then you can use it when you eat out. · Write down your questions about using the plate format. Talk to your doctor, a dietitian, or a diabetes educator about your concerns. Carbohydrate counting  With carbohydrate counting, you plan meals based on the amount of carbohydrate in each food. Carbohydrate raises blood sugar higher and more quickly than any other nutrient. It is found in desserts, breads and cereals, and fruit. It's also found in starchy vegetables such as potatoes and corn, grains such as rice and pasta, and milk and yogurt. Spreading carbohydrate throughout the day helps keep your blood sugar levels within your target range. Your daily amount depends on several things, including your weight, how active you are, which diabetes medicines you take, and what your goals are for your blood sugar levels. A registered dietitian or diabetes educator can help you plan how much carbohydrate to include in each meal and snack. A guideline for your daily amount of carbohydrate is:  · 45 to 60 grams at each meal. That's about the same as 3 to 4 carbohydrate servings. · 15 to 20 grams at each snack. That's about the same as 1 carbohydrate serving. The Nutrition Facts label on packaged foods tells you how much carbohydrate is in a serving of the food. First, look at the serving size on the food label. Is that the amount you eat in a serving? All of the nutrition information on a food label is based on that serving size. So if you eat more or less than that, you'll need to adjust the other numbers.  Total carbohydrate is the next thing you need to look for on the label. If you count carbohydrate servings, one serving of carbohydrate is 15 grams. For foods that don't come with labels, such as fresh fruits and vegetables, you'll need a guide that lists carbohydrate in these foods. Ask your doctor, dietitian, or diabetes educator about books or other nutrition guides you can use. If you take insulin, you need to know how many grams of carbohydrate are in a meal. This lets you know how much rapid-acting insulin to take before you eat. If you use an insulin pump, you get a constant rate of insulin during the day. So the pump must be programmed at meals to give you extra insulin to cover the rise in blood sugar after meals. When you know how much carbohydrate you will eat, you can take the right amount of insulin. Or, if you always use the same amount of insulin, you need to make sure that you eat the same amount of carbohydrate at meals. If you need more help to understand carbohydrate counting and food labels, ask your doctor, dietitian, or diabetes educator. How do you get started with meal planning? Here are some tips to get started:  · Plan your meals a week at a time. Don't forget to include snacks too. · Use cookbooks or online recipes to plan several main meals. Plan some quick meals for busy nights. You also can double some recipes that freeze well. Then you can save half for other busy nights when you don't have time to cook. · Make sure you have the ingredients you need for your recipes. If you're running low on basic items, put these items on your shopping list too. · List foods that you use to make breakfasts, lunches, and snacks. List plenty of fruits and vegetables. · Post this list on the refrigerator. Add to it as you think of more things you need. · Take the list to the store to do your weekly shopping. Follow-up care is a key part of your treatment and safety.  Be sure to make and go to all appointments, and call your doctor if you are having problems. It's also a good idea to know your test results and keep a list of the medicines you take. Where can you learn more? Go to http://alley-serg.info/. Garima Check in the search box to learn more about \"Learning About Meal Planning for Diabetes. \"  Current as of: March 13, 2017  Content Version: 11.3  © 3214-6998 [employer]. Care instructions adapted under license by [employer] (which disclaims liability or warranty for this information). If you have questions about a medical condition or this instruction, always ask your healthcare professional. [place] [address] any warranty or liability for your use of this information. Follow-up Disposition:  Return in about 2 months (around 9/20/2017), or if symptoms worsen or fail to improve, for routine follow up.

## 2017-07-20 NOTE — MR AVS SNAPSHOT
Visit Information Date & Time Provider Department Dept. Phone Encounter #  
 7/20/2017  9:00 AM Roberta Louis MD [facility] at [address] [id number] Upcoming Health Maintenance Date Due Pneumococcal 19-64 Medium Risk (1 of 1 - PPSV23) 7/1/1974 FOOT EXAM Q1 6/9/2016 EYE EXAM RETINAL OR DILATED Q1 6/9/2016 FOBT Q 1 YEAR AGE 50-75 6/9/2016 INFLUENZA AGE 9 TO ADULT 8/1/2017 HEMOGLOBIN A1C Q6M 12/6/2017 MICROALBUMIN Q1 2/15/2018 LIPID PANEL Q1 2/15/2018 PAP AKA CERVICAL CYTOLOGY 6/9/2018 BREAST CANCER SCRN MAMMOGRAM 6/9/2019 DTaP/Tdap/Td series (2 - Td) 6/6/2027 Allergies as of 7/20/2017  Review Complete On: 7/20/2017 By: Roberta Louis MD  
 No Known Allergies Current Immunizations  Never Reviewed No immunizations on file. Not reviewed this visit You Were Diagnosed With   
  
 Codes Comments DM type 2, uncontrolled, with neuropathy ([facility]    -  Primary ICD-10-CM: E11.40, E11.65 ICD-9-CM: 250.62, 357.2 Vitals BP Pulse Temp Resp Height(growth percentile) Weight(growth percentile) 107/61 (BP 1 Location: Right arm, BP Patient Position: Sitting) 82 97.1 °F (36.2 °C) (Oral) 18 5' 3\" (1.6 m) 194 lb 12.8 oz (88.4 kg) LMP SpO2 BMI OB Status Smoking Status (LMP Unknown) 95% 34.51 kg/m2 Hysterectomy Current Every Day Smoker Vitals History BMI and BSA Data Body Mass Index Body Surface Area 34.51 kg/m 2 1.98 m 2 Preferred Pharmacy Pharmacy Name Phone [id number], [address] [facility] [phone] Your Updated Medication List  
  
   
This list is accurate as of: 7/20/17 10:04 AM.  Always use your most recent med list.  
  
  
  
  
 Blood-Glucose Meter monitoring kit Commonly known as:  Freestyle Flash System Use as directed buPROPion  mg tablet Commonly known as:  [place] Chimera  
 Take 1 Tab by mouth every morning. Indications: ANXIETY WITH DEPRESSION  
  
 ergocalciferol 50,000 unit capsule Commonly known as:  ERGOCALCIFEROL Take 1 Cap by mouth every seven (7) days. gabapentin 100 mg capsule Commonly known as:  NEURONTIN Take 1 Cap by mouth three (3) times daily. glucose blood VI test strips strip Commonly known as:  FREESTYLE INSULINX Check sugar 3 times a day  
  
 hydrocortisone 0.5 % topical cream  
Commonly known as:  CORTAID Apply  to affected area two (2) times a day. use thin layer  
  
 insulin aspart 100 unit/mL Inpn Commonly known as:  Rosalynd Gazella 50 units in am, 30 units in evening with meal  Indications: type 2 diabetes mellitus Lancets Misc Check sugar 3 times a day  
  
 lisinopril 5 mg tablet Commonly known as:  Sandra Montse Take 1 Tab by mouth daily. Indications: DIABETIC NEPHROPATHY, hypertension  
  
 loratadine 10 mg tablet Commonly known as:  Veryl Bleak Take 1 Tab by mouth daily as needed for Allergies or Itching. lovastatin 20 mg tablet Commonly known as:  MEVACOR Take 1 Tab by mouth nightly. Indications: mixed hyperlipidemia  
  
 metFORMIN 1,000 mg tablet Commonly known as:  GLUCOPHAGE Take 1 Tab by mouth two (2) times daily (with meals). Indications: type 2 diabetes mellitus Omeprazole delayed release 20 mg tablet Commonly known as:  PRILOSEC D/R Take 1 Tab by mouth daily. Indications: gastroesophageal reflux disease  
  
 polyethylene glycol 17 gram packet Commonly known as:  Ferol Ruperto Take 1 Packet by mouth every Monday, Thursday, Saturday. Prescriptions Sent to Pharmacy Refills  
 metFORMIN (GLUCOPHAGE) 1,000 mg tablet 7 Sig: Take 1 Tab by mouth two (2) times daily (with meals). Indications: type 2 diabetes mellitus Class: Normal  
 Pharmacy: [facility]/pharm[facility] #[id number] Ph #: [phone] Route: Oral  
 insulin aspart (NOVOLOG FLEXPEN) 100 unit/mL inpn 11 Si units in am, 30 units in evening with meal  Indications: type 2 diabetes mellitus Class: Normal  
 Pharmacy: [facility]/pharmacy #[id number], [address] [place] [address] Ph #: [phone]  
 glucose blood VI test strips (FREESTYLE INSULINX) strip 11 Sig: Check sugar 3 times a day Class: Normal  
 Pharmacy: [facility]/pharmacy #[id number], [address] [place] [address] Ph #: [phone] Lancets misc 11 Sig: Check sugar 3 times a day Class: Normal  
 Pharmacy: [facility]/pharmacy #[id number], [address] [place] [address] Ph #: [phone] We Performed the Following AMB POC GLUCOSE BLOOD, BY GLUCOSE MONITORING DEVICE [49144 CPT(R)] AMB POC HEMOGLOBIN A1C [23311 CPT(R)] Introducing [facility] SERVICES! Lexi Zhu introduces [facility] patient portal. Now you can access parts of your medical record, email your doctor's office, and request medication refills online. 1. In your internet browser, go to [email or web address] 2. Click on the First Time User? Click Here link in the Sign In box. You will see the New Member Sign Up page. 3. Enter your [facility] Access Code exactly as it appears below. You will not need to use this code after youve completed the sign-up process. If you do not sign up before the expiration date, you must request a new code. · [facility] Access Code: [email or web address] Expires: 2017  2:50 PM 
 
4. Enter the last four digits of your Social Security Number (xxxx) and Date of Birth (mm/dd/yyyy) as indicated and click Submit. You will be taken to the next sign-up page. 5. Create a [employer] ID. This will be your [facility] login ID and cannot be changed, so think of one that is secure and easy to remember. 6. Create a [employer] password. You can change your password at any time. 7. Enter your Password Reset Question and Answer. This can be used at a later time if you forget your password. 8. Enter your e-mail address. You will receive e-mail notification when new information is available in [address] 9. Click Sign Up. You can now view and download portions of your medical record. 10. Click the Download Summary menu link to download a portable copy of your medical information. If you have questions, please visit the Frequently Asked Questions section of the [email or web address] website. Remember, [email or web address] is NOT to be used for urgent needs. For medical emergencies, dial 911. Now available from your iPhone and Android! Please provide this summary of care documentation to your next provider. Your primary care clinician is listed as Gi Hollis. If you have any questions after today's visit, please call [phone].

## 2017-12-20 ENCOUNTER — OFFICE VISIT (OUTPATIENT)
Dept: FAMILY MEDICINE CLINIC | Age: 62
End: 2017-12-20

## 2017-12-20 VITALS
WEIGHT: 203 LBS | TEMPERATURE: 97.1 F | DIASTOLIC BLOOD PRESSURE: 73 MMHG | HEIGHT: 63 IN | OXYGEN SATURATION: 98 % | HEART RATE: 84 BPM | SYSTOLIC BLOOD PRESSURE: 105 MMHG | RESPIRATION RATE: 20 BRPM | BODY MASS INDEX: 35.97 KG/M2

## 2017-12-20 DIAGNOSIS — F33.9 MAJOR DEPRESSIVE DISORDER, RECURRENT EPISODE WITH ANXIOUS DISTRESS (HCC): ICD-10-CM

## 2017-12-20 DIAGNOSIS — H61.20 WAX IN EAR: ICD-10-CM

## 2017-12-20 DIAGNOSIS — Z23 ENCOUNTER FOR IMMUNIZATION: ICD-10-CM

## 2017-12-20 DIAGNOSIS — M54.16 CHRONIC RADICULAR PAIN OF LOWER BACK: ICD-10-CM

## 2017-12-20 DIAGNOSIS — I10 HYPERTENSION, WELL CONTROLLED: ICD-10-CM

## 2017-12-20 DIAGNOSIS — E78.00 HYPERCHOLESTEROLEMIA: ICD-10-CM

## 2017-12-20 DIAGNOSIS — E11.40 DIABETIC NEUROPATHY, PAINFUL (HCC): ICD-10-CM

## 2017-12-20 DIAGNOSIS — M54.50 LUMBAR PAIN WITH RADIATION DOWN LEG: ICD-10-CM

## 2017-12-20 DIAGNOSIS — M79.606 LUMBAR PAIN WITH RADIATION DOWN LEG: ICD-10-CM

## 2017-12-20 DIAGNOSIS — G89.29 CHRONIC RADICULAR PAIN OF LOWER BACK: ICD-10-CM

## 2017-12-20 DIAGNOSIS — E55.9 HYPOVITAMINOSIS D: ICD-10-CM

## 2017-12-20 DIAGNOSIS — R35.0 FREQUENCY OF URINATION: ICD-10-CM

## 2017-12-20 LAB
GLUCOSE POC: 397 MG/DL
HBA1C MFR BLD HPLC: 10.6 %

## 2017-12-20 RX ORDER — ERGOCALCIFEROL 1.25 MG/1
50000 CAPSULE ORAL
Qty: 12 CAP | Refills: 3 | Status: SHIPPED | OUTPATIENT
Start: 2017-12-20 | End: 2018-08-09

## 2017-12-20 RX ORDER — INSULIN ASPART 100 [IU]/ML
INJECTION, SOLUTION INTRAVENOUS; SUBCUTANEOUS
Qty: 10 PEN | Refills: 11 | Status: SHIPPED | OUTPATIENT
Start: 2017-12-20 | End: 2017-12-20 | Stop reason: SDUPTHER

## 2017-12-20 RX ORDER — INSULIN ASPART 100 [IU]/ML
INJECTION, SOLUTION INTRAVENOUS; SUBCUTANEOUS
Qty: 10 PEN | Refills: 11 | Status: SHIPPED | OUTPATIENT
Start: 2017-12-20 | End: 2018-01-22 | Stop reason: ALTCHOICE

## 2017-12-20 RX ORDER — BUPROPION HYDROCHLORIDE 150 MG/1
150 TABLET ORAL
Qty: 30 TAB | Refills: 5 | Status: SHIPPED | OUTPATIENT
Start: 2017-12-20 | End: 2018-11-06 | Stop reason: SDUPTHER

## 2017-12-20 RX ORDER — GABAPENTIN 100 MG/1
100 CAPSULE ORAL 3 TIMES DAILY
Qty: 90 CAP | Refills: 3 | Status: SHIPPED | OUTPATIENT
Start: 2017-12-20 | End: 2018-05-03 | Stop reason: SDUPTHER

## 2017-12-20 RX ORDER — LISINOPRIL 5 MG/1
5 TABLET ORAL DAILY
Qty: 30 TAB | Refills: 5 | Status: SHIPPED | OUTPATIENT
Start: 2017-12-20 | End: 2018-08-09

## 2017-12-20 RX ORDER — INSULIN PUMP SYRINGE, 3 ML
EACH MISCELLANEOUS
Qty: 1 KIT | Refills: 0 | Status: SHIPPED | OUTPATIENT
Start: 2017-12-20 | End: 2017-12-26 | Stop reason: SDUPTHER

## 2017-12-20 RX ORDER — METFORMIN HYDROCHLORIDE 1000 MG/1
1000 TABLET ORAL 2 TIMES DAILY WITH MEALS
Qty: 60 TAB | Refills: 7 | Status: SHIPPED | OUTPATIENT
Start: 2017-12-20 | End: 2018-05-02 | Stop reason: SDUPTHER

## 2017-12-20 RX ORDER — METFORMIN HYDROCHLORIDE 1000 MG/1
1000 TABLET ORAL 2 TIMES DAILY WITH MEALS
Qty: 60 TAB | Refills: 7 | Status: SHIPPED | OUTPATIENT
Start: 2017-12-20 | End: 2017-12-20 | Stop reason: SDUPTHER

## 2017-12-20 NOTE — MR AVS SNAPSHOT
Visit Information Date & Time Provider Department Dept. Phone Encounter #  
 12/20/2017  2:30 PM Talisha Adams MD [facility] at [address] [id number] Follow-up Instructions Return in about 3 months (around 3/20/2018), or if symptoms worsen or fail to improve, for routine follow up. Upcoming Health Maintenance Date Due  
 FOOT EXAM Q1 6/9/2016 FOBT Q 1 YEAR AGE 50-75 6/9/2016 HEMOGLOBIN A1C Q6M 1/20/2018 MICROALBUMIN Q1 2/15/2018 LIPID PANEL Q1 2/15/2018 EYE EXAM RETINAL OR DILATED Q1 4/18/2018 PAP AKA CERVICAL CYTOLOGY 6/9/2018 DTaP/Tdap/Td series (2 - Td) 6/6/2027 Allergies as of 12/20/2017  Review Complete On: 12/20/2017 By: Talisha Adams MD  
 No Known Allergies Current Immunizations  Never Reviewed Name Date Influenza Vaccine (Quad) PF  Incomplete Pneumococcal Polysaccharide (PPSV-23)  Incomplete Not reviewed this visit You Were Diagnosed With   
  
 Codes Comments DM type 2, uncontrolled, with neuropathy ([facility]    -  Primary ICD-10-CM: E11.40, E11.65 ICD-9-CM: 250.62, 357.2 Encounter for immunization     ICD-10-CM: J46 ICD-9-CM: V03.89 Hypovitaminosis D     ICD-10-CM: E55.9 ICD-9-CM: 268.9 Major depressive disorder, recurrent episode with anxious distress ([facility].)     ICD-10-CM: F33.9 ICD-9-CM: 296.30 Chronic radicular pain of lower back     ICD-10-CM: M54.16, G89.29 ICD-9-CM: 724.4, 338.29 Hypercholesterolemia     ICD-10-CM: E78.00 ICD-9-CM: 272.0 Frequency of urination     ICD-10-CM: R35.0 ICD-9-CM: 788.41 Diabetic neuropathy, painful ([facility]     ICD-10-CM: E11.40 ICD-9-CM: 250.60, 357.2 Lumbar pain with radiation down leg     ICD-10-CM: M54.5 ICD-9-CM: 724.2 Hypertension, well controlled     ICD-10-CM: I10 
ICD-9-CM: 401.9 Wax in ear     ICD-10-CM: H61.20 ICD-9-CM: 380.4 Vitals BP Pulse Temp Resp Height(growth percentile) Weight(growth percentile) 105/73 84 97.1 °F (36.2 °C) (Oral) 20 5' 3\" (1.6 m) 203 lb (92.1 kg) LMP SpO2 BMI OB Status Smoking Status (LMP Unknown) 98% 35.96 kg/m2 Hysterectomy Current Every Day Smoker Vitals History BMI and BSA Data Body Mass Index Body Surface Area 35.96 kg/m 2 2.02 m 2 Preferred Pharmacy Pharmacy Name Phone [facility] #[id number]- [place], [address] [phone] Your Updated Medication List  
  
   
This list is accurate as of: 12/20/17  3:29 PM.  Always use your most recent med list.  
  
  
  
  
 Blood-Glucose Meter monitoring kit Commonly known as:  Freestyle Flash System Use as directed buPROPion  mg tablet Commonly known as:  Kj Romano Take 1 Tab by mouth every morning. Indications: ANXIETY WITH DEPRESSION  
  
 carbamide peroxide 6.5 % otic solution Commonly known as:  Jose Alberto Sesay Administer 5 Drops into each ear two (2) times a day. ergocalciferol 50,000 unit capsule Commonly known as:  ERGOCALCIFEROL Take 1 Cap by mouth every seven (7) days. gabapentin 100 mg capsule Commonly known as:  NEURONTIN Take 1 Cap by mouth three (3) times daily. glucose blood VI test strips strip Commonly known as:  FREESTYLE INSULINX Check sugar 3 times a day  
  
 insulin aspart 100 unit/mL Inpn Commonly known as:  Joanna Medin 50 units in am, 30 units in evening with meal  Indications: type 2 diabetes mellitus  
  
 lisinopril 5 mg tablet Commonly known as:  Merilynn [place] Take 1 Tab by mouth daily. Indications: Diabetic Nephropathy, hypertension  
  
 lovastatin 20 mg tablet Commonly known as:  MEVACOR Take 1 Tab by mouth nightly. Indications: mixed hyperlipidemia  
  
 metFORMIN 1,000 mg tablet Commonly known as:  GLUCOPHAGE Take 1 Tab by mouth two (2) times daily (with meals). Indications: type 2 diabetes mellitus  
  
 polyethylene glycol 17 gram packet Commonly known as:  Tyshawn Deck Take 1 Packet by mouth every Monday, Thursday, Saturday. Prescriptions Sent to Pharmacy Refills  
 metFORMIN (GLUCOPHAGE) 1,000 mg tablet 7 Sig: Take 1 Tab by mouth two (2) times daily (with meals). Indications: type 2 diabetes mellitus Class: Normal  
 Pharmacy: [facility]/pharmacy #[id number] [address] [address] Ph #: [phone] Route: Oral  
 insulin aspart (NOVOLOG FLEXPEN) 100 unit/mL inpn 11 Si units in am, 30 units in evening with meal  Indications: type 2 diabetes mellitus Class: Normal  
 Pharmacy: [facility]/pharmacy #[id number] [address] [address] Ph #: [phone]  
 glucose blood VI test strips (FREESTYLE INSULINX) strip 11 Sig: Check sugar 3 times a day Class: Normal  
 Pharmacy: [facility]/pharmacy #[id number] [address] [address] Ph #: [phone]  
 buPROPion XL (WELLBUTRIN XL) 150 mg tablet 5 Sig: Take 1 Tab by mouth every morning. Indications: ANXIETY WITH DEPRESSION Class: Normal  
 Pharmacy: [facility]/pharmacy #[id number] [address] [address] Ph #: [phone] Route: Oral  
 gabapentin (NEURONTIN) 100 mg capsule 3 Sig: Take 1 Cap by mouth three (3) times daily. Class: Normal  
 [id number] [address] [address] Ph #: [phone] Route: Oral  
 lisinopril (PRINIVIL, ZESTRIL) 5 mg tablet 5 Sig: Take 1 Tab by mouth daily. Indications: Diabetic Nephropathy, hypertension Class: Normal  
 Pharmacy: [facility]/pharmacy #[id number] [address] [address] Ph #: [phone] Route: Oral  
 ergocalciferol (ERGOCALCIFEROL) 50,000 unit capsule 3 Sig: Take 1 Cap by mouth every seven (7) days.   
 Class: Normal  
 Pharmacy: [facility]/pharmacy #[id number] Ph #: [phone] Route: Oral  
 Blood-Glucose Meter (FREESTYLE FLASH SYSTEM) monitoring kit 0 Sig: Use as directed Class: Normal  
 [id number] [address] Ph #: [phone]  
 carbamide peroxide (DEBROX) 6.5 % otic solution 0 Sig: Administer 5 Drops into each ear two (2) times a day. Class: Normal  
 [id number] [address] Ph #: [phone] Route: Both Ears We Performed the Following AMB POC GLUCOSE BLOOD, BY GLUCOSE MONITORING DEVICE [41762 CPT(R)] AMB POC HEMOGLOBIN A1C [61250 CPT(R)] INFLUENZA VIRUS VAC QUAD,SPLIT,PRESV FREE SYRINGE IM [id number] CPT(R)] LIPID PANEL [15197 CPT(R)] METABOLIC PANEL, COMPREHENSIVE [16379 CPT(R)] PNEUMOCOCCAL POLYSACCHARIDE VACCINE, 23-VALENT, ADULT OR IMMUNOSUPPRESSED PT DOSE, [59287 CPT(R)] TX IMMUNIZ ADMIN,1 SINGLE/COMB VAC/TOXOID [id number] CPT(R)] REFERRAL TO ENDOCRINOLOGY [UYO92 Custom] Comments:  
 plase help with management of diabetes REFERRAL TO ORTHOPEDICS [OJY547 Custom] URINALYSIS W/ RFLX MICROSCOPIC [44298 CPT(R)] VITAMIN D, 25 HYDROXY [id number] CPT(R)] Follow-up Instructions Return in about 3 months (around 3/20/2018), or if symptoms worsen or fail to improve, for routine follow up. Referral Information Referral ID Referred By Referred To  
  
 [id number]   
   [address] [place] [address] [address] [place], [place] [facility] Phone: [phone] Fax: [phone] Visits Status Start Date End Date 1 New Request 12/20/17 12/20/18 If your referral has a status of pending review or denied, additional information will be sent to support the outcome of this decision. Referral ID Referred By Referred To  
 [id number] [facility], ANDREW COOPER Nathaly Carlos MD  
   [address] [facility] [address] [place] Phone: [phone] Fax: [phone] Visits Status Start Date End Date 1 New Request 12/20/17 12/20/18 If your referral has a status of pending review or denied, additional information will be sent to support the outcome of this decision. Patient Instructions Vaccine Information Statement Influenza (Flu) Vaccine (Inactivated or Recombinant): What you need to know Many Vaccine Information Statements are available in [place] and other languages. See www.immunize.org/vis Hojas de Información Sobre Vacunas están disponibles en Español y en muchos otros idiomas. Visite www.immunize.org/vis 1. Why get vaccinated? Influenza (flu) is a contagious disease that spreads around the [place] every year, usually between October and May. Flu is caused by influenza viruses, and is spread mainly by coughing, sneezing, and close contact. Anyone can get flu. Flu strikes suddenly and can last several days. Symptoms vary by age, but can include: 
 fever/chills  sore throat  muscle aches  fatigue  cough  headache  runny or stuffy nose Flu can also lead to pneumonia and blood infections, and cause diarrhea and seizures in children. If you have a medical condition, such as heart or lung disease, flu can make it worse. Flu is more dangerous for some people. Infants and young children, people 72years of age and older, pregnant women, and people with certain health conditions or a weakened immune system are at greatest risk. Each year thousands of people in the [facility] die from flu, and many more are hospitalized. Flu vaccine can: 
 keep you from getting flu, 
 make flu less severe if you do get it, and 
 keep you from spreading flu to your family and other people. 2. Inactivated and recombinant flu vaccines A dose of flu vaccine is recommended every flu season. Children 6 months through 6years of age may need two doses during the same flu season. Everyone else needs only one dose each flu season. Some inactivated flu vaccines contain a very small amount of a mercury-based preservative called thimerosal. Studies have not shown thimerosal in vaccines to be harmful, but flu vaccines that do not contain thimerosal are available. There is no live flu virus in flu shots. They cannot cause the flu. There are many flu viruses, and they are always changing. Each year a new flu vaccine is made to protect against three or four viruses that are likely to cause disease in the upcoming flu season. But even when the vaccine doesnt exactly match these viruses, it may still provide some protection Flu vaccine cannot prevent: 
 flu that is caused by a virus not covered by the vaccine, or 
 illnesses that look like flu but are not. It takes about 2 weeks for protection to develop after vaccination, and protection lasts through the flu season. 3. Some people should not get this vaccine Tell the person who is giving you the vaccine:  If you have any severe, life-threatening allergies. If you ever had a life-threatening allergic reaction after a dose of flu vaccine, or have a severe allergy to any part of this vaccine, you may be advised not to get vaccinated. Most, but not all, types of flu vaccine contain a small amount of egg protein.  If you ever had Guillain-Barré Syndrome (also called GBS). Some people with a history of GBS should not get this vaccine. This should be discussed with your doctor.  If you are not feeling well. It is usually okay to get flu vaccine when you have a mild illness, but you might be asked to come back when you feel better. 4. Risks of a vaccine reaction With any medicine, including vaccines, there is a chance of reactions. These are usually mild and go away on their own, but serious reactions are also possible. Most people who get a flu shot do not have any problems with it. Minor problems following a flu shot include:  
 soreness, redness, or swelling where the shot was given  hoarseness  sore, red or itchy eyes  cough  fever  aches  headache  itching  fatigue If these problems occur, they usually begin soon after the shot and last 1 or 2 days. More serious problems following a flu shot can include the following:  There may be a small increased risk of Guillain-Barré Syndrome (GBS) after inactivated flu vaccine. This risk has been estimated at 1 or 2 additional cases per million people vaccinated. This is much lower than the risk of severe complications from flu, which can be prevented by flu vaccine.  Young children who get the flu shot along with pneumococcal vaccine (PCV13) and/or DTaP vaccine at the same time might be slightly more likely to have a seizure caused by fever. Ask your doctor for more information. Tell your doctor if a child who is getting flu vaccine has ever had a seizure. Problems that could happen after any injected vaccine:  People sometimes faint after a medical procedure, including vaccination. Sitting or lying down for about 15 minutes can help prevent fainting, and injuries caused by a fall. Tell your doctor if you feel dizzy, or have vision changes or ringing in the ears.  Some people get severe pain in the shoulder and have difficulty moving the arm where a shot was given. This happens very rarely.  Any medication can cause a severe allergic reaction. Such reactions from a vaccine are very rare, estimated at about 1 in a million doses, and would happen within a few minutes to a few hours after the vaccination. As with any medicine, there is a very remote chance of a vaccine causing a serious injury or death. The safety of vaccines is always being monitored. For more information, visit: www.cdc.gov/vaccinesafety/ 
 
 
The [facility] Vaccine Injury Compensation Program (VICP) is a federal program that was created to compensate people who may have been injured by certain vaccines. Persons who believe they may have been injured by a vaccine can learn about the program and about filing a claim by calling 9-326.743.5097 or visiting the [address] [employer] website at www.[facility].gov/vaccinecompensation. There is a time limit to file a claim for compensation. 7. How can I learn more?  Ask your healthcare provider. He or she can give you the vaccine package insert or suggest other sources of information.  Call your local or state health department.  Contact the Centers for Disease Control and Prevention (CDC): 
- Call 9-203.606.8458 (1-800-CDC-INFO) or 
- Visit CDCs website at www.cdc.gov/flu Vaccine Information Statement Inactivated Influenza Vaccine 8/7/2015 
[address] [place] [id number]-[id number] Department of Health and [employer] Centers for Disease Control and Prevention Office Use Only Vaccine Information Statement Pneumococcal Polysaccharide Vaccine: What You Need to Know Many Vaccine Information Statements are available in Finnish and other languages. See www.immunize.org/vis. Hojas de información Sobre Vacunas están disponibles en español y en muchos otros idiomas. Visite Radha.si. 1. Why get vaccinated? Vaccination can protect older adults (and some children and younger adults) from pneumococcal disease. Pneumococcal disease is caused by bacteria that can spread from person to person through close contact. It can cause ear infections, and it can also lead to more serious infections of the: 
 Lungs (pneumonia),  Blood (bacteremia), and 
 Covering of the brain and spinal cord (meningitis). Meningitis can cause deafness and brain damage, and it can be fatal.   
 
Anyone can get pneumococcal disease, but children under 3years of age, people with certain medical conditions, adults over 72years of age, and cigarette smokers are at the highest risk. About 18,000 older adults die each year from pneumococcal disease in the [place]. Treatment of pneumococcal infections with penicillin and other drugs used to be more effective. But some strains of the disease have become resistant to these drugs. This makes prevention of the disease, through vaccination, even more important. 2. Pneumococcal polysaccharide vaccine (PPSV23) Pneumococcal polysaccharide vaccine (PPSV23) protects against 23 types of pneumococcal bacteria. It will not prevent all pneumococcal disease. PPSV23 is recommended for:  All adults 72years of age and older,  Anyone 2 through 59years of age with certain long-term health problems, 
 Anyone 2 through 59years of age with a weakened immune system, 
  Adults 23 through 59years of age who smoke cigarettes or have asthma. Most people need only one dose of PPSV. A second dose is recommended for certain high-risk groups. People 72 and older should get a dose even if they have gotten one or more doses of the vaccine before they turned 65. Your healthcare provider can give you more information about these recommendations. Most healthy adults develop protection within 2 to 3 weeks of getting the shot. 3. Some people should not get this vaccine  Anyone who has had a life-threatening allergic reaction to PPSV should not get another dose.  Anyone who has a severe allergy to any component of PPSV should not receive it. Tell your provider if you have any severe allergies.  Anyone who is moderately or severely ill when the shot is scheduled may be asked to wait until they recover before getting the vaccine. Someone with a mild illness can usually be vaccinated.  Children less than 3years of age should not receive this vaccine.  There is no evidence that PPSV is harmful to either a pregnant woman or to her fetus. However, as a precaution, women who need the vaccine should be vaccinated before becoming pregnant, if possible. 4. Risks of a vaccine reaction With any medicine, including vaccines, there is a chance of side effects. These are usually mild and go away on their own, but serious reactions are also possible. About half of people who get PPSV have mild side effects, such as redness or pain where the shot is given, which go away within about two days. Less than 1 out of 100 people develop a fever, muscle aches, or more severe local reactions. Problems that could happen after any vaccine:  People sometimes faint after a medical procedure, including vaccination. Sitting or lying down for about 15 minutes can help prevent fainting, and injuries caused by a fall.  Tell your doctor if you feel dizzy, or have vision changes or ringing in the ears.  Some people get severe pain in the shoulder and have difficulty moving the arm where a shot was given. This happens very rarely.  Any medication can cause a severe allergic reaction. Such reactions from a vaccine are very rare, estimated at about 1 in a million doses, and would happen within a few minutes to a few hours after the vaccination. As with any medicine, there is a very remote chance of a vaccine causing a serious injury or death. The safety of vaccines is always being monitored. For more information, visit: www.cdc.gov/vaccinesafety/  
 
5. What if there is a serious reaction? What should I look for? Look for anything that concerns you, such as signs of a severe allergic reaction, very high fever, or unusual behavior. Signs of a severe allergic reaction can include hives, swelling of the face and throat, difficulty breathing, a fast heartbeat, dizziness, and weakness. These would usually start a few minutes to a few hours after the vaccination. What should I do? If you think it is a severe allergic reaction or other emergency that cant wait, call 9-1-1 or get to the nearest hospital. Otherwise, call your doctor. Afterward, the reaction should be reported to the Vaccine Adverse Event Reporting System (VAERS). Your doctor might file this report, or you can do it yourself through the VAERS web site at www.vaers. hhs.gov, or by calling 4-670.246.5541. VAERS does not give medical advice. 6. How can I learn more?  Ask your doctor. He or she can give you the vaccine package insert or suggest other sources of information.  Call your local or state health department.  Contact the Centers for Disease Control and Prevention (CDC): 
- Call 2-839.731.1848 (1-800-CDC-INFO) or 
- Visit CDCs website at www.cdc.gov/vaccines Vaccine Information Statement PPSV  
04/24/2015 Department of Health and [employer] Centers for Disease Control and Prevention Office Use Only Introducing [facility] SERVICES! Kole Desai introduces [employer] patient portal. Now you can access parts of your medical record, email your doctor's office, and request medication refills online. 1. In your internet browser, go to [email or web address] 2. Click on the First Time User? Click Here link in the Sign In box. You will see the New Member Sign Up page. 3. Enter your [employer] Access Code exactly as it appears below. You will not need to use this code after youve completed the sign-up process. If you do not sign up before the expiration date, you must request a new code. · [employer] Access Code: [email or web address] Expires: 3/20/2018  3:27 PM 
 
4. Enter the last four digits of your Social Security Number (xxxx) and Date of Birth (mm/dd/yyyy) as indicated and click Submit. You will be taken to the next sign-up page. 5. Create a [employer] ID. This will be your [employer] login ID and cannot be changed, so think of one that is secure and easy to remember. 6. Create a [employer] password. You can change your password at any time. 7. Enter your Password Reset Question and Answer. This can be used at a later time if you forget your password. 8. Enter your e-mail address. You will receive e-mail notification when new information is available in [address] 9. Click Sign Up. You can now view and download portions of your medical record. 10. Click the Download Summary menu link to download a portable copy of your medical information. If you have questions, please visit the Frequently Asked Questions section of the [employer] website. Remember, [employer] is NOT to be used for urgent needs. For medical emergencies, dial 911. Now available from your iPhone and Android! Please provide this summary of care documentation to your next provider. Your primary care clinician is listed as Anant Dumont.  If you have any questions after today's visit, please call [phone].

## 2017-12-20 NOTE — PROGRESS NOTES
Chief Complaint   Patient presents with    Physical     HPI:  Marquise Botello is a 58 y.o. female with diabetes type 2, hypertension, hypercholesterolemia and diabetic neuropathy presents accompanied by daughter and  for long overdue follow up  Blood pressure is at goal, A1C today is 10.6%, she has not been to see the endocrinologist.     Review of Systems  As per hpi    Past Medical History:   Diagnosis Date    Arthritis     shoulders and knees.  Diabetes ([address]     insulin dependent     Hypercholesterolemia     Hypertension      Past Surgical History:   Procedure Laterality Date    HX GYN      hysterectomy     Social History     Social History    Marital status:      Spouse name: N/A    Number of children: N/A    Years of education: N/A     Social History Main Topics    Smoking status: Current Every Day Smoker     Packs/day: 2.00     Years: 3.00    Smokeless tobacco: Never Used    Alcohol use No    Drug use: No    Sexual activity: Not Asked     Other Topics Concern    None     Social History Narrative     Family History   Problem Relation Age of Onset    Diabetes Brother      Current Outpatient Prescriptions   Medication Sig Dispense Refill    metFORMIN (GLUCOPHAGE) 1,000 mg tablet Take 1 Tab by mouth two (2) times daily (with meals). Indications: type 2 diabetes mellitus 60 Tab 7    insulin aspart (NOVOLOG FLEXPEN) 100 unit/mL inpn 50 units in am, 30 units in evening with meal  Indications: type 2 diabetes mellitus 10 Pen 11    glucose blood VI test strips (FREESTYLE INSULINX) strip Check sugar 3 times a day 100 Strip 11    buPROPion XL (WELLBUTRIN XL) 150 mg tablet Take 1 Tab by mouth every morning. Indications: ANXIETY WITH DEPRESSION 30 Tab 5    gabapentin (NEURONTIN) 100 mg capsule Take 1 Cap by mouth three (3) times daily. 90 Cap 3    lisinopril (PRINIVIL, ZESTRIL) 5 mg tablet Take 1 Tab by mouth daily.  Indications: DIABETIC NEPHROPATHY, hypertension 30 Tab 5    ergocalciferol (ERGOCALCIFEROL) 50,000 unit capsule Take 1 Cap by mouth every seven (7) days. 12 Cap 3    Blood-Glucose Meter (FREESTYLE FLASH SYSTEM) monitoring kit Use as directed 1 Kit 0    lovastatin (MEVACOR) 20 mg tablet Take 1 Tab by mouth nightly. Indications: mixed hyperlipidemia 30 Tab 5    polyethylene glycol (MIRALAX) 17 gram packet Take 1 Packet by mouth every Monday, Thursday, Saturday.  30 Packet 5     No Known Allergies    Objective:  Visit Vitals    /73    Pulse 84    Temp 97.1 °F (36.2 °C) (Oral)    Resp 20    Ht 5' 3\" (1.6 m)    Wt 203 lb (92.1 kg)    LMP  (LMP Unknown)    SpO2 98%    BMI 35.96 kg/m2     Physical Exam:   General appearance - alert, well appearing in no distress  EYE-PERRL, EOMI  ENT-ENT exam normal, no neck nodes or sinus tenderness  Mouth - mucous membranes moist, pharynx normal without lesions  Neck - supple, no significant adenopathy   Chest - clear to auscultation, no wheezes, rales or rhonchi  Heart - normal rate, regular rhythm, normal blood pressure   Abdomen - soft, nontender, nondistended, no organomegaly  Lymph- no adenopathy palpable  Ext-peripheral pulses normal, no pedal edema  Neuro -alert, oriented, normal speech, no focal findings or movement disorder noted    Results for orders placed or performed in visit on 12/20/17   AMB POC GLUCOSE BLOOD, BY GLUCOSE MONITORING DEVICE   Result Value Ref Range    Glucose  mg/dL   AMB POC HEMOGLOBIN A1C   Result Value Ref Range    Hemoglobin A1c (POC) 10.6 %     Assessment/Plan:    ICD-10-CM ICD-9-CM    1. DM type 2, uncontrolled, with neuropathy (HCC) E11.40 250.62 AMB POC GLUCOSE BLOOD, BY GLUCOSE MONITORING DEVICE    E11.65 357.2 AMB POC HEMOGLOBIN A1C      REFERRAL TO ENDOCRINOLOGY      METABOLIC PANEL, COMPREHENSIVE      metFORMIN (GLUCOPHAGE) 1,000 mg tablet      insulin aspart (NOVOLOG FLEXPEN) 100 unit/mL inpn      glucose blood VI test strips (FREESTYLE INSULINX) strip      DISCONTINUED: metFORMIN (GLUCOPHAGE) 1,000 mg tablet      DISCONTINUED: insulin aspart (NOVOLOG FLEXPEN) 100 unit/mL inpn   2. Encounter for immunization Z23 V03.89 INFLUENZA VIRUS VAC QUAD,SPLIT,PRESV FREE SYRINGE IM      PNEUMOCOCCAL POLYSACCHARIDE VACCINE, 23-VALENT, ADULT OR IMMUNOSUPPRESSED PT DOSE,      ID IMMUNIZ ADMIN,1 SINGLE/COMB VAC/TOXOID   3. Hypovitaminosis D E55.9 268.9 VITAMIN D, 25 HYDROXY      ergocalciferol (ERGOCALCIFEROL) 50,000 unit capsule   4. Major depressive disorder, recurrent episode with anxious distress (HCC) F33.9 296.30 buPROPion XL (WELLBUTRIN XL) 150 mg tablet   5. Chronic radicular pain of lower back M54.16 724.4 REFERRAL TO ORTHOPEDICS    G89.29 338.29    6. Hypercholesterolemia E78.00 272.0 LIPID PANEL   7. Frequency of urination R35.0 788.41 URINALYSIS W/ RFLX MICROSCOPIC   8. Diabetic neuropathy, painful (HCC) E11.40 250.60 gabapentin (NEURONTIN) 100 mg capsule     357.2    9. Lumbar pain with radiation down leg M54.5 724.2 gabapentin (NEURONTIN) 100 mg capsule   10. Hypertension, well controlled I10 401.9 lisinopril (PRINIVIL, ZESTRIL) 5 mg tablet   11. Wax in ear H61.20 380.4 carbamide peroxide (DEBROX) 6.5 % otic solution     Patient Instructions     Vaccine Information Statement    Influenza (Flu) Vaccine (Inactivated or Recombinant): What you need to know    Many Vaccine Information Statements are available in [place] and other languages. See www.immunize.org/vis  Hojas de Información Sobre Vacunas están disponibles en Español y en muchos otros idiomas. Visite www.immunize.org/vis    1. Why get vaccinated? Influenza (flu) is a contagious disease that spreads around the [place] every year, usually between October and May. Flu is caused by influenza viruses, and is spread mainly by coughing, sneezing, and close contact. Anyone can get flu. Flu strikes suddenly and can last several days.  Symptoms vary by age, but can include:   fever/chills   sore throat   muscle aches   fatigue   cough   headache    runny or stuffy nose    Flu can also lead to pneumonia and blood infections, and cause diarrhea and seizures in children. If you have a medical condition, such as heart or lung disease, flu can make it worse. Flu is more dangerous for some people. Infants and young children, people 72years of age and older, pregnant women, and people with certain health conditions or a weakened immune system are at greatest risk. Each year thousands of people in the [facility] die from flu, and many more are hospitalized. Flu vaccine can:   keep you from getting flu,   make flu less severe if you do get it, and   keep you from spreading flu to your family and other people. 2. Inactivated and recombinant flu vaccines    A dose of flu vaccine is recommended every flu season. Children 6 months through 6years of age may need two doses during the same flu season. Everyone else needs only one dose each flu season. Some inactivated flu vaccines contain a very small amount of a mercury-based preservative called thimerosal. Studies have not shown thimerosal in vaccines to be harmful, but flu vaccines that do not contain thimerosal are available. There is no live flu virus in flu shots. They cannot cause the flu. There are many flu viruses, and they are always changing. Each year a new flu vaccine is made to protect against three or four viruses that are likely to cause disease in the upcoming flu season. But even when the vaccine doesnt exactly match these viruses, it may still provide some protection    Flu vaccine cannot prevent:   flu that is caused by a virus not covered by the vaccine, or   illnesses that look like flu but are not. It takes about 2 weeks for protection to develop after vaccination, and protection lasts through the flu season.      3. Some people should not get this vaccine    Tell the person who is giving you the vaccine:     If you have any severe, life-threatening allergies. If you ever had a life-threatening allergic reaction after a dose of flu vaccine, or have a severe allergy to any part of this vaccine, you may be advised not to get vaccinated. Most, but not all, types of flu vaccine contain a small amount of egg protein.  If you ever had Guillain-Barré Syndrome (also called GBS). Some people with a history of GBS should not get this vaccine. This should be discussed with your doctor.  If you are not feeling well. It is usually okay to get flu vaccine when you have a mild illness, but you might be asked to come back when you feel better. 4. Risks of a vaccine reaction    With any medicine, including vaccines, there is a chance of reactions. These are usually mild and go away on their own, but serious reactions are also possible. Most people who get a flu shot do not have any problems with it. Minor problems following a flu shot include:    soreness, redness, or swelling where the shot was given     hoarseness   sore, red or itchy eyes   cough   fever   aches   headache   itching   fatigue  If these problems occur, they usually begin soon after the shot and last 1 or 2 days. More serious problems following a flu shot can include the following:     There may be a small increased risk of Guillain-Barré Syndrome (GBS) after inactivated flu vaccine. This risk has been estimated at 1 or 2 additional cases per million people vaccinated. This is much lower than the risk of severe complications from flu, which can be prevented by flu vaccine.  Young children who get the flu shot along with pneumococcal vaccine (PCV13) and/or DTaP vaccine at the same time might be slightly more likely to have a seizure caused by fever. Ask your doctor for more information. Tell your doctor if a child who is getting flu vaccine has ever had a seizure.      Problems that could happen after any injected vaccine:  People sometimes faint after a medical procedure, including vaccination. Sitting or lying down for about 15 minutes can help prevent fainting, and injuries caused by a fall. Tell your doctor if you feel dizzy, or have vision changes or ringing in the ears.  Some people get severe pain in the shoulder and have difficulty moving the arm where a shot was given. This happens very rarely.  Any medication can cause a severe allergic reaction. Such reactions from a vaccine are very rare, estimated at about 1 in a million doses, and would happen within a few minutes to a few hours after the vaccination. As with any medicine, there is a very remote chance of a vaccine causing a serious injury or death. The safety of vaccines is always being monitored. For more information, visit: www.cdc.gov/vaccinesafety/    5. What if there is a serious reaction? What should I look for?  Look for anything that concerns you, such as signs of a severe allergic reaction, very high fever, or unusual behavior. Signs of a severe allergic reaction can include hives, swelling of the face and throat, difficulty breathing, a fast heartbeat, dizziness, and weakness - usually within a few minutes to a few hours after the vaccination. What should I do?  If you think it is a severe allergic reaction or other emergency that cant wait, call 9-1-1 and get the person to the nearest hospital. Otherwise, call your doctor.  Reactions should be reported to the Vaccine Adverse Event Reporting System (VAERS). Your doctor should file this report, or you can do it yourself through  the VAERS web site at www.vaers. hhs.gov, or by calling 1-144.388.7196. VAERS does not give medical advice. 6. The National Vaccine Injury Compensation Program    The [facility] Vaccine Injury Compensation Program (VICP) is a federal program that was created to compensate people who may have been injured by certain vaccines.     Persons who believe they may have been injured by a vaccine can learn about the program and about filing a claim by calling 3-398.437.8341 or visiting the [address] [employer] website at www.[facility].gov/vaccinecompensation. There is a time limit to file a claim for compensation. 7. How can I learn more?  Ask your healthcare provider. He or she can give you the vaccine package insert or suggest other sources of information.  Call your local or state health department.  Contact the Centers for Disease Control and Prevention (CDC):  - Call 3-811.971.8330 (1-800-CDC-INFO) or  - Visit CDCs website at www.cdc.gov/flu    Vaccine Information Statement   Inactivated Influenza Vaccine   8/7/2015  [id number]    Department of Health and Human Services  Centers for Disease Control and Prevention    Office Use Only    Vaccine Information Statement    Pneumococcal Polysaccharide Vaccine: What You Need to Know    Many Vaccine Information Statements are available in [place] and other languages. See www.immunize.org/vis. Hojas de información Sobre Vacunas están disponibles en español y en muchos otros idiomas. Visite [facility].si. 1. Why get vaccinated? Vaccination can protect older adults (and some children and younger adults) from pneumococcal disease. Pneumococcal disease is caused by bacteria that can spread from person to person through close contact. It can cause ear infections, and it can also lead to more serious infections of the:   Lungs (pneumonia),   Blood (bacteremia), and   Covering of the brain and spinal cord (meningitis). Meningitis can cause deafness and brain damage, and it can be fatal.      Anyone can get pneumococcal disease, but children under 3years of age, people with certain medical conditions, adults over 72years of age, and cigarette smokers are at the highest risk. About 18,000 older adults die each year from pneumococcal disease in the [place].     Treatment of pneumococcal infections with penicillin and other drugs used to be more effective. But some strains of the disease have become resistant to these drugs. This makes prevention of the disease, through vaccination, even more important. 2. Pneumococcal polysaccharide vaccine (PPSV23)    Pneumococcal polysaccharide vaccine (PPSV23) protects against 23 types of pneumococcal bacteria. It will not prevent all pneumococcal disease. PPSV23 is recommended for:   All adults 72years of age and older,   Anyone 2 through 59years of age with certain long-term health problems,   Anyone 2 through 59years of age with a weakened immune system,   Adults 23 through 59years of age who smoke cigarettes or have asthma. Most people need only one dose of PPSV. A second dose is recommended for certain high-risk groups. People 72 and older should get a dose even if they have gotten one or more doses of the vaccine before they turned 65. Your healthcare provider can give you more information about these recommendations. Most healthy adults develop protection within 2 to 3 weeks of getting the shot. 3. Some people should not get this vaccine     Anyone who has had a life-threatening allergic reaction to PPSV should not get another dose.  Anyone who has a severe allergy to any component of PPSV should not receive it. Tell your provider if you have any severe allergies.  Anyone who is moderately or severely ill when the shot is scheduled may be asked to wait until they recover before getting the vaccine. Someone with a mild illness can usually be vaccinated.  Children less than 3years of age should not receive this vaccine.  There is no evidence that PPSV is harmful to either a pregnant woman or to her fetus. However, as a precaution, women who need the vaccine should be vaccinated before becoming pregnant, if possible.     4. Risks of a vaccine reaction    With any medicine, including vaccines, there is a chance of side effects. These are usually mild and go away on their own, but serious reactions are also possible. About half of people who get PPSV have mild side effects, such as redness or pain where the shot is given, which go away within about two days. Less than 1 out of 100 people develop a fever, muscle aches, or more severe local reactions. Problems that could happen after any vaccine:     People sometimes faint after a medical procedure, including vaccination. Sitting or lying down for about 15 minutes can help prevent fainting, and injuries caused by a fall. Tell your doctor if you feel dizzy, or have vision changes or ringing in the ears.  Some people get severe pain in the shoulder and have difficulty moving the arm where a shot was given. This happens very rarely.  Any medication can cause a severe allergic reaction. Such reactions from a vaccine are very rare, estimated at about 1 in a million doses, and would happen within a few minutes to a few hours after the vaccination. As with any medicine, there is a very remote chance of a vaccine causing a serious injury or death. The safety of vaccines is always being monitored. For more information, visit: www.cdc.gov/vaccinesafety/     5. What if there is a serious reaction? What should I look for? Look for anything that concerns you, such as signs of a severe allergic reaction, very high fever, or unusual behavior. Signs of a severe allergic reaction can include hives, swelling of the face and throat, difficulty breathing, a fast heartbeat, dizziness, and weakness. These would usually start a few minutes to a few hours after the vaccination. What should I do? If you think it is a severe allergic reaction or other emergency that cant wait, call 9-1-1 or get to the nearest hospital. Otherwise, call your doctor. Afterward, the reaction should be reported to the Vaccine Adverse Event Reporting System (VAERS).  Your doctor might file this report, or you can do it yourself through the [employer] web site at www.[employer].pl. [facility].gov, or by calling 0-685.388.1816. [employer] does not give medical advice. 6. How can I learn more?  Ask your doctor. He or she can give you the vaccine package insert or suggest other sources of information.  Call your local or state health department.  Contact the Centers for Disease Control and Prevention (CDC):  - Call 7-813.296.8782 (1-800-CDC-INFO) or  - Visit CDCs website at myhub 18 04/24/2015    [facility] for Disease Control and Prevention    Office Use Only       Follow-up Disposition:  Return in about 3 months (around 3/20/2018), or if symptoms worsen or fail to improve, for routine follow up.

## 2017-12-20 NOTE — PATIENT INSTRUCTIONS
Vaccine Information Statement    Influenza (Flu) Vaccine (Inactivated or Recombinant): What you need to know    Many Vaccine Information Statements are available in Yi and other languages. See www.immunize.org/vis  Hojas de Información Sobre Vacunas están disponibles en Español y en muchos otros idiomas. Visite www.immunize.org/vis    1. Why get vaccinated? Influenza (flu) is a contagious disease that spreads around the [place] every year, usually between October and May. Flu is caused by influenza viruses, and is spread mainly by coughing, sneezing, and close contact. Anyone can get flu. Flu strikes suddenly and can last several days. Symptoms vary by age, but can include:   fever/chills   sore throat   muscle aches   fatigue   cough   headache    runny or stuffy nose    Flu can also lead to pneumonia and blood infections, and cause diarrhea and seizures in children. If you have a medical condition, such as heart or lung disease, flu can make it worse. Flu is more dangerous for some people. Infants and young children, people 72years of age and older, pregnant women, and people with certain health conditions or a weakened immune system are at greatest risk. Each year thousands of people in the [facility] die from flu, and many more are hospitalized. Flu vaccine can:   keep you from getting flu,   make flu less severe if you do get it, and   keep you from spreading flu to your family and other people. 2. Inactivated and recombinant flu vaccines    A dose of flu vaccine is recommended every flu season. Children 6 months through 6years of age may need two doses during the same flu season. Everyone else needs only one dose each flu season.        Some inactivated flu vaccines contain a very small amount of a mercury-based preservative called thimerosal. Studies have not shown thimerosal in vaccines to be harmful, but flu vaccines that do not contain thimerosal are available. There is no live flu virus in flu shots. They cannot cause the flu. There are many flu viruses, and they are always changing. Each year a new flu vaccine is made to protect against three or four viruses that are likely to cause disease in the upcoming flu season. But even when the vaccine doesnt exactly match these viruses, it may still provide some protection    Flu vaccine cannot prevent:   flu that is caused by a virus not covered by the vaccine, or   illnesses that look like flu but are not. It takes about 2 weeks for protection to develop after vaccination, and protection lasts through the flu season. 3. Some people should not get this vaccine    Tell the person who is giving you the vaccine:     If you have any severe, life-threatening allergies. If you ever had a life-threatening allergic reaction after a dose of flu vaccine, or have a severe allergy to any part of this vaccine, you may be advised not to get vaccinated. Most, but not all, types of flu vaccine contain a small amount of egg protein.  If you ever had Guillain-Barré Syndrome (also called GBS). Some people with a history of GBS should not get this vaccine. This should be discussed with your doctor.  If you are not feeling well. It is usually okay to get flu vaccine when you have a mild illness, but you might be asked to come back when you feel better. 4. Risks of a vaccine reaction    With any medicine, including vaccines, there is a chance of reactions. These are usually mild and go away on their own, but serious reactions are also possible. Most people who get a flu shot do not have any problems with it.      Minor problems following a flu shot include:    soreness, redness, or swelling where the shot was given     hoarseness   sore, red or itchy eyes   cough   fever   aches   headache   itching   fatigue  If these problems occur, they usually begin soon after the shot and last 1 or 2 days. More serious problems following a flu shot can include the following:     There may be a small increased risk of Guillain-Barré Syndrome (GBS) after inactivated flu vaccine. This risk has been estimated at 1 or 2 additional cases per million people vaccinated. This is much lower than the risk of severe complications from flu, which can be prevented by flu vaccine.  Young children who get the flu shot along with pneumococcal vaccine (PCV13) and/or DTaP vaccine at the same time might be slightly more likely to have a seizure caused by fever. Ask your doctor for more information. Tell your doctor if a child who is getting flu vaccine has ever had a seizure. Problems that could happen after any injected vaccine:      People sometimes faint after a medical procedure, including vaccination. Sitting or lying down for about 15 minutes can help prevent fainting, and injuries caused by a fall. Tell your doctor if you feel dizzy, or have vision changes or ringing in the ears.  Some people get severe pain in the shoulder and have difficulty moving the arm where a shot was given. This happens very rarely.  Any medication can cause a severe allergic reaction. Such reactions from a vaccine are very rare, estimated at about 1 in a million doses, and would happen within a few minutes to a few hours after the vaccination. As with any medicine, there is a very remote chance of a vaccine causing a serious injury or death. The safety of vaccines is always being monitored. For more information, visit: www.cdc.gov/vaccinesafety/    5. What if there is a serious reaction? What should I look for?  Look for anything that concerns you, such as signs of a severe allergic reaction, very high fever, or unusual behavior.     Signs of a severe allergic reaction can include hives, swelling of the face and throat, difficulty breathing, a fast heartbeat, dizziness, and weakness - usually within a few minutes to a few hours after the vaccination. What should I do?  If you think it is a severe allergic reaction or other emergency that cant wait, call 9-1-1 and get the person to the nearest hospital. Otherwise, call your doctor.  Reactions should be reported to the Vaccine Adverse Event Reporting System (VAERS). Your doctor should file this report, or you can do it yourself through  the VAERS web site at www.vaers. [facility].gov, or by calling 0-969.837.8039. VAERS does not give medical advice. 6. The National Vaccine Injury Compensation Program    The [facility] Vaccine Injury Compensation Program (VICP) is a federal program that was created to compensate people who may have been injured by certain vaccines. Persons who believe they may have been injured by a vaccine can learn about the program and about filing a claim by calling 5-494.759.4465 or visiting the [employer] website at www.[facility].gov/vaccinecompensation. There is a time limit to file a claim for compensation. 7. How can I learn more?  Ask your healthcare provider. He or she can give you the vaccine package insert or suggest other sources of information.  Call your local or state health department.  Contact the Centers for Disease Control and Prevention (CDC):  - Call 0-789.712.8702 (1-800-CDC-INFO) or  - Visit CDCs website at www.cdc.gov/flu    Vaccine Information Statement   Inactivated Influenza Vaccine   8/7/2015  42 NATE Alegre [id number]    Department of Health and Human Services  Centers for Disease Control and Prevention    Office Use Only    Vaccine Information Statement    Pneumococcal Polysaccharide Vaccine: What You Need to Know    Many Vaccine Information Statements are available in Danish and other languages. See www.immunize.org/vis. Hojas de información Sobre Vacunas están disponibles en español y en muchos otros idiomas. Visite [employer].si. 1. Why get vaccinated?     Vaccination can protect older adults (and some children and younger adults) from pneumococcal disease. Pneumococcal disease is caused by bacteria that can spread from person to person through close contact. It can cause ear infections, and it can also lead to more serious infections of the:   Lungs (pneumonia),   Blood (bacteremia), and   Covering of the brain and spinal cord (meningitis). Meningitis can cause deafness and brain damage, and it can be fatal.      Anyone can get pneumococcal disease, but children under 3years of age, people with certain medical conditions, adults over 72years of age, and cigarette smokers are at the highest risk. About 18,000 older adults die each year from pneumococcal disease in the [place]. Treatment of pneumococcal infections with penicillin and other drugs used to be more effective. But some strains of the disease have become resistant to these drugs. This makes prevention of the disease, through vaccination, even more important. 2. Pneumococcal polysaccharide vaccine (PPSV23)    Pneumococcal polysaccharide vaccine (PPSV23) protects against 23 types of pneumococcal bacteria. It will not prevent all pneumococcal disease. PPSV23 is recommended for:   All adults 72years of age and older,   Anyone 2 through 59years of age with certain long-term health problems,   Anyone 2 through 59years of age with a weakened immune system,   Adults 23 through 59years of age who smoke cigarettes or have asthma. Most people need only one dose of PPSV. A second dose is recommended for certain high-risk groups. People 72 and older should get a dose even if they have gotten one or more doses of the vaccine before they turned 65. Your healthcare provider can give you more information about these recommendations. Most healthy adults develop protection within 2 to 3 weeks of getting the shot.      3. Some people should not get this vaccine     Anyone who has had a life-threatening allergic reaction to PPSV should not get another dose.  Anyone who has a severe allergy to any component of PPSV should not receive it. Tell your provider if you have any severe allergies.  Anyone who is moderately or severely ill when the shot is scheduled may be asked to wait until they recover before getting the vaccine. Someone with a mild illness can usually be vaccinated.  Children less than 3years of age should not receive this vaccine.  There is no evidence that PPSV is harmful to either a pregnant woman or to her fetus. However, as a precaution, women who need the vaccine should be vaccinated before becoming pregnant, if possible. 4. Risks of a vaccine reaction    With any medicine, including vaccines, there is a chance of side effects. These are usually mild and go away on their own, but serious reactions are also possible. About half of people who get PPSV have mild side effects, such as redness or pain where the shot is given, which go away within about two days. Less than 1 out of 100 people develop a fever, muscle aches, or more severe local reactions. Problems that could happen after any vaccine:     People sometimes faint after a medical procedure, including vaccination. Sitting or lying down for about 15 minutes can help prevent fainting, and injuries caused by a fall. Tell your doctor if you feel dizzy, or have vision changes or ringing in the ears.  Some people get severe pain in the shoulder and have difficulty moving the arm where a shot was given. This happens very rarely.  Any medication can cause a severe allergic reaction. Such reactions from a vaccine are very rare, estimated at about 1 in a million doses, and would happen within a few minutes to a few hours after the vaccination. As with any medicine, there is a very remote chance of a vaccine causing a serious injury or death. The safety of vaccines is always being monitored.   For more information, visit: www.cdc.gov/vaccinesafety/     5. What if there is a serious reaction? What should I look for? Look for anything that concerns you, such as signs of a severe allergic reaction, very high fever, or unusual behavior. Signs of a severe allergic reaction can include hives, swelling of the face and throat, difficulty breathing, a fast heartbeat, dizziness, and weakness. These would usually start a few minutes to a few hours after the vaccination. What should I do? If you think it is a severe allergic reaction or other emergency that cant wait, call 9-1-1 or get to the nearest hospital. Otherwise, call your doctor. Afterward, the reaction should be reported to the Vaccine Adverse Event Reporting System (VAERS). Your doctor might file this report, or you can do it yourself through the VAERS web site at www.vaers. [facility].gov, or by calling 3-935.648.2436. VAERS does not give medical advice. 6. How can I learn more?  Ask your doctor. He or she can give you the vaccine package insert or suggest other sources of information.  Call your local or state health department.    Contact the Centers for Disease Control and Prevention (CDC):  - Call 2-131.676.4576 (1-800-CDC-INFO) or  - Visit CDCs website at [address] 04/24/2015    [facility] for Disease Control and Prevention    Office Use Only

## 2017-12-20 NOTE — PROGRESS NOTES
Chief Complaint   Patient presents with    Physical     Patient is here today for routine follow up. Patient is C/O back pain and swelling legs, difficult to walk.

## 2017-12-26 RX ORDER — INSULIN PUMP SYRINGE, 3 ML
EACH MISCELLANEOUS
Qty: 1 KIT | Refills: 0 | Status: SHIPPED | OUTPATIENT
Start: 2017-12-26

## 2017-12-27 RX ORDER — INSULIN PUMP SYRINGE, 3 ML
EACH MISCELLANEOUS
Qty: 1 KIT | Refills: 0 | Status: SHIPPED | OUTPATIENT
Start: 2017-12-27 | End: 2018-11-06 | Stop reason: SDUPTHER

## 2018-01-12 LAB
25(OH)D3+25(OH)D2 SERPL-MCNC: 23.5 NG/ML (ref 30–100)
ALBUMIN SERPL-MCNC: 4 G/DL (ref 3.6–4.8)
ALBUMIN/GLOB SERPL: 1.3 {RATIO} (ref 1.2–2.2)
ALP SERPL-CCNC: 66 IU/L (ref 39–117)
ALT SERPL-CCNC: 17 IU/L (ref 0–32)
APPEARANCE UR: ABNORMAL
AST SERPL-CCNC: 14 IU/L (ref 0–40)
BACTERIA #/AREA URNS HPF: ABNORMAL /[HPF]
BILIRUB SERPL-MCNC: 0.4 MG/DL (ref 0–1.2)
BILIRUB UR QL STRIP: NEGATIVE
BUN SERPL-MCNC: 16 MG/DL (ref 8–27)
BUN/CREAT SERPL: 20 (ref 12–28)
CALCIUM SERPL-MCNC: 9.3 MG/DL (ref 8.7–10.3)
CASTS URNS QL MICRO: ABNORMAL /LPF
CHLORIDE SERPL-SCNC: 98 MMOL/L (ref 96–106)
CHOLEST SERPL-MCNC: 205 MG/DL (ref 100–199)
CO2 SERPL-SCNC: 24 MMOL/L (ref 18–29)
COLOR UR: YELLOW
CREAT SERPL-MCNC: 0.81 MG/DL (ref 0.57–1)
EPI CELLS #/AREA URNS HPF: >10 /HPF
GLOBULIN SER CALC-MCNC: 3 G/DL (ref 1.5–4.5)
GLUCOSE SERPL-MCNC: 260 MG/DL (ref 65–99)
GLUCOSE UR QL: ABNORMAL
HDLC SERPL-MCNC: 46 MG/DL
HGB UR QL STRIP: NEGATIVE
KETONES UR QL STRIP: NEGATIVE
LDLC SERPL CALC-MCNC: 115 MG/DL (ref 0–99)
LEUKOCYTE ESTERASE UR QL STRIP: ABNORMAL
MICRO URNS: ABNORMAL
MUCOUS THREADS URNS QL MICRO: PRESENT
NITRITE UR QL STRIP: NEGATIVE
PH UR STRIP: 5 [PH] (ref 5–7.5)
POTASSIUM SERPL-SCNC: 4.4 MMOL/L (ref 3.5–5.2)
PROT SERPL-MCNC: 7 G/DL (ref 6–8.5)
PROT UR QL STRIP: NEGATIVE
RBC #/AREA URNS HPF: ABNORMAL /HPF
SODIUM SERPL-SCNC: 139 MMOL/L (ref 134–144)
SP GR UR: 1.02 (ref 1–1.03)
TRIGL SERPL-MCNC: 221 MG/DL (ref 0–149)
UROBILINOGEN UR STRIP-MCNC: 0.2 MG/DL (ref 0.2–1)
VLDLC SERPL CALC-MCNC: 44 MG/DL (ref 5–40)
WBC #/AREA URNS HPF: ABNORMAL /HPF

## 2018-01-22 ENCOUNTER — OFFICE VISIT (OUTPATIENT)
Dept: ENDOCRINOLOGY | Age: 63
End: 2018-01-22

## 2018-01-22 VITALS
BODY MASS INDEX: 35.97 KG/M2 | SYSTOLIC BLOOD PRESSURE: 109 MMHG | WEIGHT: 203 LBS | DIASTOLIC BLOOD PRESSURE: 75 MMHG | HEART RATE: 94 BPM | HEIGHT: 63 IN

## 2018-01-22 RX ORDER — INSULIN ASPART 100 [IU]/ML
INJECTION, SUSPENSION SUBCUTANEOUS
Qty: 10 PEN | Refills: 6 | Status: SHIPPED | OUTPATIENT
Start: 2018-01-22 | End: 2018-05-02 | Stop reason: SDUPTHER

## 2018-01-22 RX ORDER — INSULIN ASPART 100 [IU]/ML
INJECTION, SUSPENSION SUBCUTANEOUS
Qty: 1 PEN | Refills: 0 | Status: SHIPPED | COMMUNITY
Start: 2018-01-22 | End: 2018-01-22 | Stop reason: SDUPTHER

## 2018-01-22 RX ORDER — PEN NEEDLE, DIABETIC 31 GX3/16"
1 NEEDLE, DISPOSABLE MISCELLANEOUS
Qty: 200 PEN NEEDLE | Refills: 3 | Status: SHIPPED | OUTPATIENT
Start: 2018-01-22 | End: 2018-05-02 | Stop reason: SDUPTHER

## 2018-01-22 NOTE — PROGRESS NOTES
This 57 yo Polish woman with Type 2 diabetes comes for diabetes care and self-management training. Diagnosed 18 years ago after her son was killed in the war. There is no family history of diabetes. Always treated with oral agents and insulin. Patient is accompanied by her daughter and granddaughter. A  is being used for visit communication. A1c 10.6% (December 2017). Patient has suffered with lower back pain that radiates into both legs. This pain interferes with her sleep. Past Medical History:   Diagnosis Date    Arthritis     shoulders and knees.  Diabetes ([address]     insulin dependent     Hypercholesterolemia     Hypertension        Current Outpatient Prescriptions   Medication Sig    glucose blood VI test strips (FREESTYLE INSULINX) strip Check sugar 3 times a day    lancets 32 gauge misc 1 Each by Does Not Apply route three (3) times daily.  insulin aspart protamine/insulin aspart (NOVOLOG MIX 70-30 FLEXPEN) 100 unit/mL (70-30) inpn 20 units at noon (first meal) and 40 units at 7 pm (second meal)    Insulin Needles, Disposable, (COMFORT EZ PEN NEEDLES) 32 gauge x 5/32\" ndle 1 Each by Does Not Apply route Before breakfast and dinner.  Blood-Glucose Meter (FREESTYLE SYSTEM KIT) monitoring kit Test 3 times daily  DX  E11.40,  E11.65    Blood-Glucose Meter (FREESTYLE FLASH SYSTEM) monitoring kit Test 3 Times Daily   DX E11.40,  E11.65    metFORMIN (GLUCOPHAGE) 1,000 mg tablet Take 1 Tab by mouth two (2) times daily (with meals). Indications: type 2 diabetes mellitus    buPROPion XL (WELLBUTRIN XL) 150 mg tablet Take 1 Tab by mouth every morning. Indications: ANXIETY WITH DEPRESSION    gabapentin (NEURONTIN) 100 mg capsule Take 1 Cap by mouth three (3) times daily.  lisinopril (PRINIVIL, ZESTRIL) 5 mg tablet Take 1 Tab by mouth daily. Indications: Diabetic Nephropathy, hypertension    ergocalciferol (ERGOCALCIFEROL) 50,000 unit capsule Take 1 Cap by mouth every seven (7) days.  carbamide peroxide (DEBROX) 6.5 % otic solution Administer 5 Drops into each ear two (2) times a day. No current facility-administered medications for this visit. Medication compliance per Neto Mcdfufie LPN    Nursing Assessment:    Subjective:   Diabetes Self-care Practices  Healthy Eating-Consumes moderate carbohydrate meals two times on most days. Variation in CHO load noted. (B) 12 noon 1/2 chapati with egg and tea   (D) 7pm  Fish or chicken with one chapati, vegetables and lentil soup   (BT) Later  Fruit   (Silvia) Lots of juices to treat low blood sugar  Being Active-Is in wheelchair because of pain. She is not paralyzed (according to daughter)  Monitoring-Is testing blood glucoses three times daily. Lots of hypoglycemia as well as hyperglycemia on meter download. See scanned report. Taking Medications-Is taking prescribed diabetes medications    Objective:  Alert, oriented and in no acute distress     Visit Vitals    /75 (BP 1 Location: Right arm, BP Patient Position: Sitting)    Pulse 94    Ht 5' 3\" (1.6 m)    Wt 203 lb (92.1 kg)    BMI 35.96 kg/m2        Lab Results   Component Value Date    HBA1C 10.0 (H) 06/09/2015    HBA1C 9.2 (H) 02/06/2015    RMP2FXSY 10.6 12/20/2017    SXI5XUPY 9.7 07/20/2017    DYX4ZLSL 10.3 06/06/2017    CHOL 205 (H) 01/11/2018    LDLC 115 (H) 01/11/2018    GFRAA 90 01/11/2018    GFRNA 78 01/11/2018    MCACR 4.8 02/15/2017    TSH 0.656 02/06/2015    VITD3 23.5 (L) 01/11/2018       Diabetes Self-care Practices  Problemsolving-Participated in constructing diabetes strategy using shared decision making. Healthy Coping-Is not anxious or depressed. Is constantly dealing with pain. Has difficulty sleeping. Reducing Risks-Annual diabetic foot exam completed today. Not on ASA or statin therapy; is on BP medications. Barriers to diabetes self-care-Include educational gaps.     Nursing Diagnosis:    Ineffective Health Management      Nursing Interventions:  Examined usual diabetes self-management practices related to meals, physical activity, BG monitoring and medication dosing  Explored strategies patient is willing to incorporate into their self-care plan  Informed of need to use abdomen for injection sites since there are knots in both arms where insulin is currently being injected  Informed that insulin type and dosing will be changed      Evaluation: Patient is ready, willing, and agrees to significant change in treatment plan. Plan:  1. Diabetes medication reconciliation per Nini Arce MD  2. Stop Novolog insulin per Nini Arce MD  3. Begin Novolog 70/30 insulin 20 units in the morning and 40 units in the evening per Nini Arce MD  4. Check blood sugars at the time of each meal (12 noon and 7pm)  5. Use abdomen for injections of insulin  6.  Return to clinic in one month    Jaime Wadsworth DNP, RN, ACNS-BC, BC-ADM, CDE  Adult Health Clinical Nurse Specialist-Diabetes & Endocrine

## 2018-01-22 NOTE — PATIENT INSTRUCTIONS
Plan:  1. Stop taking Novolog insulin. 2. Begin taking 20 units of Novolog 70/30 insulin with the 12 noon meal  3. Begin taking 40 units of Novolog 70/30 insulin with the 7pm meal  4. Inject insulin into abdomen  5. Check blood sugars before noon and 7pm meal  6. Return to clinic in 4 weeks    These instructions were also given in Danish-printed on a separate sheet as the Google translation would not post to this document.

## 2018-01-22 NOTE — PROGRESS NOTES
Chief Complaint   Patient presents with    New Patient    Diabetes     Type II diabetes. Last A1C - 10.6% - 12/20/17. Patient is testing BS 3 times daily - before meals     Diabetic Foot Exam     Foot exam is due     Other     Next eye exam is due 4/18/18       HPI  This is a 55-year-old Syriac female referred for evaluation and management of type 2 diabetes mellitus. She is accompanied by her daughter with the entire visit was via  #[id number]. Patient does not have a family history of diabetes but to of her children were killed in the war prior to moving to the [place] and she developed diabetes subsequently. She was placed on metformin and then more recently Metformin plus NovoLog insulin. Please note this is regular NovoLog and not NovoLog 7030. She is reportedly been taking metformin thousand milligrams twice daily, 50 units of NovoLog in the morning and 30 units of NovoLog in the evening. Unfortunately as a result of the high doses of short acting insulin, the patient is having multiple episodes of hyper and hypoglycemia. All of her hemoglobin A1c's are in the 10+% range. The patient has complaints of peripheral paresthesias and sleep disorder. As far as we are able to determine, the patient eats two regular meals a day. First meal occurs at about noon. Patient sleeps until noon because she sleeps poorly overnight. The first meal is bread and tea and eggs. We gather that the piece of bread is roughly 1/2 of a full size chapati. Because of frequent hypoglycemia, she drinks juice throughout the day to bring her blood sugar back up. The evening meal occurs at about 7:00 in the evening. This can be bread with fish, vegetables lentil soup and fruit. Again she will be drinking orange juice or lemon juice or pineapple juice because of frequent hypoglycemia. .  We downloaded her FreeStyle meter, and her blood sugars are mostly extremely elevated in the 200-400 range.   All of her insulin injections are in the deltoids bilaterally. ROS  Denies chest pain or shortness of breath, constipation or diarrhea. Again she sleeps poorly. Family History   Problem Relation Age of Onset    Diabetes Brother         Social History     Social History    Marital status:      Spouse name: N/A    Number of children: N/A    Years of education: N/A     Social History Main Topics    Smoking status: Current Every Day Smoker     Packs/day: 2.00     Years: 3.00    Smokeless tobacco: Never Used    Alcohol use No    Drug use: No    Sexual activity: Not Asked     Other Topics Concern    None     Social History Narrative        Vitals:    01/22/18 1455   BP: 109/75   Pulse: 94   Weight: 203 lb (92.1 kg)   Height: 5' 3\" (1.6 m)   PainSc:   0 - No pain        Physical Examination: General appearance - alert, well appearing, and in no distress  Mental status - alert, oriented to person, place, and time  Neck - supple, no significant adenopathy, thyroid exam: thyroid is normal in size without nodules or tenderness  Chest - clear to auscultation, no wheezes, rales or rhonchi, symmetric air entry  Heart - normal rate, regular rhythm, normal S1, S2, no murmurs, rubs, clicks or gallops  Abdomen - soft, nontender, nondistended, no masses or organomegaly  Extremities - peripheral pulses normal, no pedal edema, no clubbing or cyanosis    Diabetic foot exam:     Left: Reflexes 2+     Vibratory sensation diminished    Filament test reduced sensation with micro filament   Pulse DP: 2+ (normal)   Pulse PT: 2+ (normal)   Deformities: None  Right: Reflexes 2+   Vibratory sensation diminished   Filament test reduced sensation with micro filament   Pulse DP: 2+ (normal)   Pulse PT: 2+ (normal)   Deformities: None      ASSESSMENT & PLAN  This woman has poorly controlled type 2 diabetes mellitus on metformin plus regular NovoLog insulin twice daily.   Based on her food profile, and her timing of meals, we have elected to switch her from NovoLog insulin to NovoLog 70/30 insulin. Based on her weight, she will take 20 units in the morning and 40 units in the evening. Her diet history indicates a much higher carbohydrate load in the evening than in the morning. We do hope that with the elimination of 50 units of short-acting insulin in the morning, the frequent hypoglycemic can be eliminated and the need for the juices can also be eliminated. We have asked her to monitor her blood sugars 3 times a day and return in 1 month. We may well have to increase the dose of insulin but at least we can eliminate the hypoglycemia and the need for the juices which are driving hyperglycemia. We did continue the Metformin at 1000 mg twice daily.

## 2018-01-22 NOTE — MR AVS SNAPSHOT
[address] [facility] [address] [id number] [phone] Patient: Fatoumata Crawford MRN: [id number] FGX:6/1/3732 Visit Information Date & Time Provider Department Dept. Phone Encounter #  
 1/22/2018  2:50 PM Liset Rodriguez, [address] [place] [address] Diabetes and Endocrinology [id number] Your Appointments 3/21/2018  2:45 PM  
ROUTINE CARE with Elian Painter MD  
[facility] App[facility] Note: 3m fu  
 [address] [id number]  
[facility] Upcoming Health Maintenance Date Due  
 FOOT EXAM Q1 6/9/2016 FOBT Q 1 YEAR AGE 50-75 6/9/2016 MICROALBUMIN Q1 2/15/2018 EYE EXAM RETINAL OR DILATED Q1 4/18/2018 PAP AKA CERVICAL CYTOLOGY 6/9/2018 HEMOGLOBIN A1C Q6M 6/20/2018 LIPID PANEL Q1 1/11/2019 BREAST CANCER SCRN MAMMOGRAM 6/9/2019 DTaP/Tdap/Td series (2 - Td) 6/6/2027 Allergies as of 1/22/2018  Review Complete On: 1/22/2018 By: Liset Rodriguez MD  
 No Known Allergies Current Immunizations  Never Reviewed Name Date Influenza Vaccine (Quad) PF 12/20/2017  2:30 PM  
 Pneumococcal Polysaccharide (PPSV-23) 12/20/2017  2:30 PM  
  
 Not reviewed this visit You Were Diagnosed With   
  
 Codes Comments DM type 2, uncontrolled, [facility] ([facility]    -  Primary ICD-10-CM: E11.40, E11.65 ICD-9-CM: 250.62, 357.2 Vitals BP Pulse Height(growth percentile) Weight(growth percentile) LMP BMI  
 109/75 (BP 1 Location: Right arm, BP Patient Position: Sitting) 94 5' 3\" (1.6 m) 203 lb (92.1 kg) (LMP Unknown) 35.96 kg/m2 OB Status Smoking Status Hysterectomy Current Every Day Smoker Vitals History BMI and BSA Data Body Mass Index Body Surface Area 35.96 kg/m 2 2.02 m 2 Preferred Pharmacy Pharmacy Name Phone [facility]/PHARMACY #[id number] [address] [phone] Your Updated Medication List  
  
   
This list is accurate as of: 1/22/18  4:13 PM.  Always use your most recent med list.  
  
  
  
  
 * Blood-Glucose Meter monitoring kit Commonly known as:  Freestyle Flash System Test 3 Times Daily   DX E11.40,  E11.65 * Blood-Glucose Meter monitoring kit Commonly known as:  FREESTYLE SYSTEM KIT Test 3 times daily  DX  E11.40,  E11.65  
  
 buPROPion  mg tablet Commonly known as:  Loralyn Fujita Take 1 Tab by mouth every morning. Indications: ANXIETY WITH DEPRESSION  
  
 carbamide peroxide 6.5 % otic solution Commonly known as:  Wynell Sis Administer 5 Drops into each ear two (2) times a day. ergocalciferol 50,000 unit capsule Commonly known as:  ERGOCALCIFEROL Take 1 Cap by mouth every seven (7) days. gabapentin 100 mg capsule Commonly known as:  NEURONTIN Take 1 Cap by mouth three (3) times daily. glucose blood VI test strips strip Commonly known as:  FREESTYLE INSULINX Check sugar 3 times a day  
  
 insulin aspart protamine/insulin aspart 100 unit/mL (70-30) Inpn Commonly known as:  NovoLOG Mix 70-30 FlexPen 20 units at noon (first meal) and 40 units at 7 pm (second meal) Insulin Needles (Disposable) 32 gauge x 5/32\" Ndle Commonly known as:  COMFORT EZ PEN NEEDLES  
1 Each by Does Not Apply route Before breakfast and dinner. lancets 32 gauge Misc  
1 Each by Does Not Apply route three (3) times daily. lisinopril 5 mg tablet Commonly known as:  Metta Lawman Take 1 Tab by mouth daily. Indications: Diabetic Nephropathy, hypertension  
  
 metFORMIN 1,000 mg tablet Commonly known as:  GLUCOPHAGE Take 1 Tab by mouth two (2) times daily (with meals). Indications: type 2 diabetes mellitus * Notice:   This list has 2 medication(s) that are the same as other medications prescribed for you. Read the directions carefully, and ask your doctor or other care provider to review them with you. Prescriptions Sent to Pharmacy Refills  
 glucose blood VI test strips (FREESTYLE INSULINX) strip 4 Sig: Check sugar 3 times a day Class: Normal  
 Pharmacy: [facility]/pharmacy #[id number] Ph #: [phone]  
 lancets 32 gauge misc 3 Si Each by Does Not Apply route three (3) times daily. Class: Normal  
 Pharmacy: [facility]/pharmacy #[id number] Ph #: [phone] Route: Does Not Apply  
 insulin aspart protamine/insulin aspart (NOVOLOG MIX 70-30 FLEXPEN) 100 unit/mL (70-30) inpn 6 Si units at noon (first meal) and 40 units at 7 pm (second meal) Class: Normal  
 Pharmacy: [facility]/pharmacy #[id number] Ph #: [phone] Insulin Needles, Disposable, (COMFORT EZ PEN NEEDLES) 32 gauge x 5/32\" ndle 3 Si Each by Does Not Apply route Before breakfast and dinner. Class: Normal  
 Pharmacy: [facility]/pharmacy #[id number] Ph #: [phone] Route: Does Not Apply Patient Instructions Plan: 1. Stop taking Novolog insulin. 2. Begin taking 20 units of Novolog 70/30 insulin with the 12 noon meal 
3. Begin taking 40 units of Novolog 70/30 insulin with the 7pm meal 
4. Inject insulin into abdomen 5. Check blood sugars before noon and 7pm meal 
6. Return to clinic in 4 weeks Introducing [facility]! Deidra Pastrana introduces [employer] patient portal. Now you can access parts of your medical record, email your doctor's office, and request medication refills online. 1. In your internet browser, go to [email or web address] 2. Click on the First Time User? Click Here link in the Sign In box.  You will see the New Member Sign Up page. 3. Enter your Nephros Access Code exactly as it appears below. You will not need to use this code after youve completed the sign-up process. If you do not sign up before the expiration date, you must request a new code. · Nephros Access Code: [email or web address] Expires: 3/20/2018  3:27 PM 
 
4. Enter the last four digits of your Social Security Number (xxxx) and Date of Birth (mm/dd/yyyy) as indicated and click Submit. You will be taken to the next sign-up page. 5. Create a Nephros ID. This will be your Nephros login ID and cannot be changed, so think of one that is secure and easy to remember. 6. Create a Nephros password. You can change your password at any time. 7. Enter your Password Reset Question and Answer. This can be used at a later time if you forget your password. 8. Enter your e-mail address. You will receive e-mail notification when new information is available in [address] 9. Click Sign Up. You can now view and download portions of your medical record. 10. Click the Download Summary menu link to download a portable copy of your medical information. If you have questions, please visit the Frequently Asked Questions section of the Nephros website. Remember, Nephros is NOT to be used for urgent needs. For medical emergencies, dial 911. Now available from your iPhone and Android! Please provide this summary of care documentation to your next provider. Your primary care clinician is listed as Elian Painter. If you have any questions after today's visit, please call [phone].

## 2018-02-02 RX ORDER — PEN NEEDLE, DIABETIC 31 GX3/16"
NEEDLE, DISPOSABLE MISCELLANEOUS
Qty: 200 PEN NEEDLE | Refills: 3 | Status: SHIPPED | OUTPATIENT
Start: 2018-02-02 | End: 2018-05-02 | Stop reason: SDUPTHER

## 2018-05-02 ENCOUNTER — OFFICE VISIT (OUTPATIENT)
Dept: FAMILY MEDICINE CLINIC | Age: 63
End: 2018-05-02

## 2018-05-02 VITALS
HEIGHT: 63 IN | DIASTOLIC BLOOD PRESSURE: 60 MMHG | OXYGEN SATURATION: 98 % | TEMPERATURE: 97.1 F | SYSTOLIC BLOOD PRESSURE: 90 MMHG | HEART RATE: 72 BPM | RESPIRATION RATE: 20 BRPM | WEIGHT: 204 LBS | BODY MASS INDEX: 36.14 KG/M2

## 2018-05-02 DIAGNOSIS — E78.00 HYPERCHOLESTEROLEMIA: ICD-10-CM

## 2018-05-02 DIAGNOSIS — E55.9 HYPOVITAMINOSIS D: ICD-10-CM

## 2018-05-02 DIAGNOSIS — M79.601 PAIN IN INFERIOR RIGHT UPPER EXTREMITY: ICD-10-CM

## 2018-05-02 DIAGNOSIS — E11.40 DIABETIC NEUROPATHY, PAINFUL (HCC): ICD-10-CM

## 2018-05-02 DIAGNOSIS — M79.606 LUMBAR PAIN WITH RADIATION DOWN LEG: ICD-10-CM

## 2018-05-02 DIAGNOSIS — M54.41 BILATERAL LOW BACK PAIN WITH RIGHT-SIDED SCIATICA, UNSPECIFIED CHRONICITY: ICD-10-CM

## 2018-05-02 DIAGNOSIS — M54.50 LUMBAR PAIN WITH RADIATION DOWN LEG: ICD-10-CM

## 2018-05-02 DIAGNOSIS — M79.89 SWELLING OF ARM: ICD-10-CM

## 2018-05-02 PROBLEM — E66.01 SEVERE OBESITY (BMI 35.0-39.9) WITH COMORBIDITY (HCC): Status: ACTIVE | Noted: 2018-05-02

## 2018-05-02 LAB
GLUCOSE POC: 308 MG/DL
HBA1C MFR BLD HPLC: 10.7 %

## 2018-05-02 RX ORDER — LANCETS 28 GAUGE
EACH MISCELLANEOUS
Refills: 2 | COMMUNITY
Start: 2018-04-12 | End: 2019-07-08 | Stop reason: SDUPTHER

## 2018-05-02 RX ORDER — PEN NEEDLE, DIABETIC 31 GX3/16"
NEEDLE, DISPOSABLE MISCELLANEOUS
Qty: 200 PEN NEEDLE | Refills: 3 | Status: SHIPPED | OUTPATIENT
Start: 2018-05-02 | End: 2019-07-08 | Stop reason: SDUPTHER

## 2018-05-02 RX ORDER — INSULIN ASPART 100 [IU]/ML
INJECTION, SUSPENSION SUBCUTANEOUS
Qty: 10 PEN | Refills: 6 | Status: SHIPPED | OUTPATIENT
Start: 2018-05-02 | End: 2018-08-09 | Stop reason: SDUPTHER

## 2018-05-02 RX ORDER — METFORMIN HYDROCHLORIDE 1000 MG/1
1000 TABLET ORAL 2 TIMES DAILY WITH MEALS
Qty: 60 TAB | Refills: 7 | Status: SHIPPED | OUTPATIENT
Start: 2018-05-02 | End: 2018-08-09 | Stop reason: SDUPTHER

## 2018-05-02 RX ORDER — METHOCARBAMOL 500 MG/1
500 TABLET, FILM COATED ORAL 4 TIMES DAILY
Qty: 20 TAB | Refills: 3 | Status: SHIPPED | OUTPATIENT
Start: 2018-05-02 | End: 2018-08-09

## 2018-05-02 NOTE — PATIENT INSTRUCTIONS

## 2018-05-02 NOTE — MR AVS SNAPSHOT
[address] [facility] 
[phone] Patient: Ashley Whyte MRN: [id number] IMD:2[birth year] Visit Information Date & Time Provider Department Dept. Phone Encounter #  
 5/2/2018  3:00 PM Luis Robertson MD [facility] at [address] [id number] Follow-up Instructions Return in about 3 months (around 8/2/2018), or if symptoms worsen or fail to improve, for routine follow up. Upcoming Health Maintenance Date Due  
 FOOT EXAM Q1 6/9/2016 FOBT Q 1 YEAR AGE 50-75 6/9/2016 MICROALBUMIN Q1 2/15/2018 EYE EXAM RETINAL OR DILATED Q1 4/18/2018 PAP AKA CERVICAL CYTOLOGY 6/9/2018 HEMOGLOBIN A1C Q6M 6/20/2018 Influenza Age 5 to Adult 8/1/2018 LIPID PANEL Q1 1/11/2019 BREAST CANCER SCRN MAMMOGRAM 6/9/2019 DTaP/Tdap/Td series (2 - Td) 6/6/2027 Allergies as of 5/2/2018  Review Complete On: 5/2/2018 By: Mitchell Morataya LPN No Known Allergies Current Immunizations  Never Reviewed Name Date Influenza Vaccine (Quad) PF 12/20/2017  2:30 PM  
 Pneumococcal Polysaccharide (PPSV-23) 12/20/2017  2:30 PM  
  
 Not reviewed this visit You Were Diagnosed With   
  
 Codes Comments DM type 2, uncontrolled, with neuropathy ([facility]    -  Primary ICD-10-CM: E11.40, E11.65 ICD-9-CM: 250.62, 357.2 Hypovitaminosis D     ICD-10-CM: E55.9 ICD-9-CM: 268.9 Hypercholesterolemia     ICD-10-CM: E78.00 ICD-9-CM: 272.0 Pain in inferior right upper extremity     ICD-10-CM: M79.601 ICD-9-CM: 729.5 Swelling of arm     ICD-10-CM: M79.89 ICD-9-CM: 729.81 Bilateral low back pain with right-sided sciatica, unspecified chronicity     ICD-10-CM: M54.41 
ICD-9-CM: 724.3 Vitals BP Pulse Temp Resp Height(growth percentile) Weight(growth percentile) 90/60 72 97.1 °F (36.2 °C) (Oral) 20 5' 3\" (1.6 m) 204 lb (92.5 kg) LMP SpO2 BMI OB Status Smoking Status (LMP Unknown) 98% 36.14 kg/m2 Hysterectomy Current Every Day Smoker Vitals History BMI and BSA Data Body Mass Index Body Surface Area  
 36.14 kg/m 2 2.03 m 2 Preferred Pharmacy Pharmacy Name Phone [id number], [address] [place] [address] [phone] Your Updated Medication List  
  
   
This list is accurate as of 5/2/18  4:10 PM.  Always use your most recent med list.  
  
  
  
  
 * Blood-Glucose Meter monitoring kit Commonly known as:  Freestyle Flash System Test 3 Times Daily   DX E11.40,  E11.65 * Blood-Glucose Meter monitoring kit Commonly known as:  FREESTYLE SYSTEM KIT Test 3 times daily  DX  E11.40,  E11.65  
  
 buPROPion  mg tablet Commonly known as:  Edilson Rich Take 1 Tab by mouth every morning. Indications: ANXIETY WITH DEPRESSION  
  
 carbamide peroxide 6.5 % otic solution Commonly known as:  Hildegarde Aiden Administer 5 Drops into each ear two (2) times a day. ergocalciferol 50,000 unit capsule Commonly known as:  ERGOCALCIFEROL Take 1 Cap by mouth every seven (7) days. gabapentin 100 mg capsule Commonly known as:  NEURONTIN Take 1 Cap by mouth three (3) times daily. glucose blood VI test strips strip Commonly known as:  FREESTYLE INSULINX Check sugar 3 times a day  
  
 insulin aspart protamine/insulin aspart 100 unit/mL (70-30) Inpn Commonly known as:  NovoLOG Mix 70-30FlexPen U-100  
20 units at noon (first meal) and 40 units at 7 pm (second meal) Insulin Needles (Disposable) 31 gauge x 3/16\" Ndle Commonly known as:  BD ULTRA-FINE MINI PEN NEEDLE Use to inject insulin twice daily * lancets 32 gauge Misc  
1 Each by Does Not Apply route three (3) times daily. * FREESTYLE LANCETS 28 gauge Misc Generic drug:  lancets TESTING 3 TIMES A DAY  
  
 lisinopril 5 mg tablet Commonly known as:  Sabrina Silva  
 Take 1 Tab by mouth daily. Indications: Diabetic Nephropathy, hypertension  
  
 metFORMIN 1,000 mg tablet Commonly known as:  GLUCOPHAGE Take 1 Tab by mouth two (2) times daily (with meals). Indications: type 2 diabetes mellitus  
  
 methocarbamol 500 mg tablet Commonly known as:  ROBAXIN Take 1 Tab by mouth four (4) times daily. * Notice: This list has 4 medication(s) that are the same as other medications prescribed for you. Read the directions carefully, and ask your doctor or other care provider to review them with you. Prescriptions Sent to Pharmacy Refills Insulin Needles, Disposable, (BD ULTRA-FINE MINI PEN NEEDLE) 31 gauge x 3/\" ndle 3 Sig: Use to inject insulin twice daily Class: Normal  
 Pharmacy: [facility]/pharmacy #[id number] Ph #: [phone] insulin aspart protamine/insulin aspart (NOVOLOG MIX 70-30FLEXPEN U-100) 100 unit/mL (70-30) inpn 6 Si units at noon (first meal) and 40 units at 7 pm (second meal) Class: Normal  
 Pharmacy: [facility]/pharmacy #[id number] Ph #: [phone]  
 metFORMIN (GLUCOPHAGE) 1,000 mg tablet 7 Sig: Take 1 Tab by mouth two (2) times daily (with meals). Indications: type 2 diabetes mellitus Class: Normal  
 [id number] [address] Ph #: [phone] Route: Oral  
 methocarbamol (ROBAXIN) 500 mg tablet 3 Sig: Take 1 Tab by mouth four (4) times daily. Class: Normal  
 [id number] [address] Ph #: [phone] Route: Oral  
  
We Performed the Following AMB POC GLUCOSE BLOOD, BY GLUCOSE MONITORING DEVICE [26322 CPT(R)] AMB POC HEMOGLOBIN A1C [11606 CPT(R)] LIPID PANEL [09163 CPT(R)] METABOLIC PANEL, COMPREHENSIVE [91156 CPT(R)] MICROALBUMIN, UR, RAND W/ MICROALB/CREAT RATIO [id number] CPT(R)] REFERRAL TO PHYSICAL THERAPY [TFK90 Custom] REFERRAL TO RHEUMATOLOGY [OGS34 Custom] Follow-up Instructions Return in about 3 months (around 8/2/2018), or if symptoms worsen or fail to improve, for routine follow up. Referral Information Referral ID Referred By Referred To  
  
 [id number] [facility] Not Available Visits Status Start Date End Date 1 New Request 5/2/18 5/2/19 If your referral has a status of pending review or denied, additional information will be sent to support the outcome of this decision. Referral ID Referred By Referred To  
 [id number] [facility], [facility] PH[facility]  
   [address] [facility], [address] Phone: [phone] Visits Status Start Date End Date 1 New Request 5/2/18 5/2/19 If your referral has a status of pending review or denied, additional information will be sent to support the outcome of this decision. Patient Instructions Back Pain: Care Instructions Your Care Instructions Back pain has many possible causes. It is often related to problems with muscles and ligaments of the back. It may also be related to problems with the nerves, discs, or bones of the back. Moving, lifting, standing, sitting, or sleeping in an awkward way can strain the back. Sometimes you don't notice the injury until later. Arthritis is another common cause of back pain. Although it may hurt a lot, back pain usually improves on its own within several weeks. Most people recover in 12 weeks or less. Using good home treatment and being careful not to stress your back can help you feel better sooner. Follow-up care is a key part of your treatment and safety. Be sure to make and go to all appointments, and call your doctor if you are having problems. It's also a good idea to know your test results and keep a list of the medicines you take. How can you care for yourself at home? · Sit or lie in positions that are most comfortable and reduce your pain. Try one of these positions when you lie down: ¨ Lie on your back with your knees bent and supported by large pillows. ¨ Lie on the floor with your legs on the seat of a sofa or chair. Merlin Antu on your side with your knees and hips bent and a pillow between your legs. ¨ Lie on your stomach if it does not make pain worse. · Do not sit up in bed, and avoid soft couches and twisted positions. Bed rest can help relieve pain at first, but it delays healing. Avoid bed rest after the first day of back pain. · Change positions every 30 minutes. If you must sit for long periods of time, take breaks from sitting. Get up and walk around, or lie in a comfortable position. · Try using a heating pad on a low or medium setting for 15 to 20 minutes every 2 or 3 hours. Try a warm shower in place of one session with the heating pad. · You can also try an ice pack for 10 to 15 minutes every 2 to 3 hours. Put a thin cloth between the ice pack and your skin. · Take pain medicines exactly as directed. ¨ If the doctor gave you a prescription medicine for pain, take it as prescribed. ¨ If you are not taking a prescription pain medicine, ask your doctor if you can take an over-the-counter medicine. · Take short walks several times a day. You can start with 5 to 10 minutes, 3 or 4 times a day, and work up to longer walks. Walk on level surfaces and avoid hills and stairs until your back is better. · Return to work and other activities as soon as you can. Continued rest without activity is usually not good for your back. · To prevent future back pain, do exercises to stretch and strengthen your back and stomach. Learn how to use good posture, safe lifting techniques, and proper body mechanics. When should you call for help? Call your doctor now or seek immediate medical care if: ? · You have new or worsening numbness in your legs. ? · You have new or worsening weakness in your legs. (This could make it hard to stand up.) ? · You lose control of your bladder or bowels. ? Watch closely for changes in your health, and be sure to contact your doctor if: 
? · Your pain gets worse. ? · You are not getting better after 2 weeks. Where can you learn more? Go to http://alley-serg.info/. Enter H440 in the search box to learn more about \"Back Pain: Care Instructions. \" Current as of: March 21, 2017 Content Version: 11.4 © 3155-6805 [employer]. Care instructions adapted under license by [employer] (which disclaims liability or warranty for this information). If you have questions about a medical condition or this instruction, always ask your healthcare professional. [address] any warranty or liability for your use of this information. Introducing [facility]! Mitchel Jessica introduces [employer] patient portal. Now you can access parts of your medical record, email your doctor's office, and request medication refills online. 1. In your internet browser, go to [email or web address] Options 2. Click on the First Time User? Click Here link in the Sign In box. You will see the New Member Sign Up page. 3. Enter your [employer] Access Code exactly as it appears below. You will not need to use this code after youve completed the sign-up process. If you do not sign up before the expiration date, you must request a new code. · [employer] Access Code: [email or web address] Expires: 7/31/2018  3:18 PM 
 
4. Enter the last four digits of your Social Security Number (xxxx) and Date of Birth (mm/dd/yyyy) as indicated and click Submit. You will be taken to the next sign-up page. 5. Create a Complex Media ID. This will be your [employer] Media login ID and cannot be changed, so think of one that is secure and easy to remember. 6. Create a [employer] password. You can change your password at any time. 7. Enter your Password Reset Question and Answer. This can be used at a later time if you forget your password. 8. Enter your e-mail address. You will receive e-mail notification when new information is available in [address] 9. Click Sign Up. You can now view and download portions of your medical record. 10. Click the Download Summary menu link to download a portable copy of your medical information. If you have questions, please visit the Frequently Asked Questions section of the [employer] website. Remember, [employer] is NOT to be used for urgent needs. For medical emergencies, dial 911. Now available from your iPhone and Android! Please provide this summary of care documentation to your next provider. Your primary care clinician is listed as Donaldo Spivey. If you have any questions after today's visit, please call [phone].

## 2018-05-02 NOTE — PROGRESS NOTES
Chief Complaint   Patient presents with    Diabetes    Leg Pain    Arm Pain    Head Pain     dizziness     HPI:  Radha Jerome is a 58 y.o. female with diabetes type 2(a1c of 10.7% today), hypertension, hypercholesterolemia and diabetic neuropathy presents accompanied by son for follow up. Blood pressure is at goal. Patient is known to be non compliant with diabetic diet and medication. She finally saw the endocrinologist 01/22/18. Today she is complaining of for likely due to diabetic neuropathy considering elevated glucose 300's and A1C of 10.7%     Review of Systems  As per hpi    Past Medical History:   Diagnosis Date    Arthritis     shoulders and knees.  Diabetes ([address]     insulin dependent     Hypercholesterolemia     Hypertension      Past Surgical History:   Procedure Laterality Date    HX GYN      hysterectomy     Social History     Social History    Marital status:      Spouse name: N/A    Number of children: N/A    Years of education: N/A     Social History Main Topics    Smoking status: Current Every Day Smoker     Packs/day: 2.00     Years: 3.00    Smokeless tobacco: Never Used    Alcohol use No    Drug use: No    Sexual activity: Not Asked     Other Topics Concern    None     Social History Narrative     Family History   Problem Relation Age of Onset    Diabetes Brother      Current Outpatient Prescriptions   Medication Sig Dispense Refill    FREESTYLE LANCETS 28 gauge misc TESTING 3 TIMES A DAY  2    Insulin Needles, Disposable, (BD INSULIN PEN NEEDLE UF MINI) 31 gauge x 3/16\" ndle Use to inject insulin twice daily 200 Pen Needle 3    glucose blood VI test strips (FREESTYLE INSULINX) strip Check sugar 3 times a day 300 Strip 4    lancets 32 gauge misc 1 Each by Does Not Apply route three (3) times daily.  300 Lancet 3    insulin aspart protamine/insulin aspart (NOVOLOG MIX 70-30 FLEXPEN) 100 unit/mL (70-30) inpn 20 units at noon (first meal) and 40 units at 7 pm (second meal) 10 Pen 6    Insulin Needles, Disposable, (COMFORT EZ PEN NEEDLES) 32 gauge x 5/32\" ndle 1 Each by Does Not Apply route Before breakfast and dinner. 200 Pen Needle 3    Blood-Glucose Meter (FREESTYLE SYSTEM KIT) monitoring kit Test 3 times daily  DX  E11.40,  E11.65 1 Kit 0    Blood-Glucose Meter (FREESTYLE FLASH SYSTEM) monitoring kit Test 3 Times Daily   DX E11.40,  E11.65 1 Kit 0    metFORMIN (GLUCOPHAGE) 1,000 mg tablet Take 1 Tab by mouth two (2) times daily (with meals). Indications: type 2 diabetes mellitus 60 Tab 7    buPROPion XL (WELLBUTRIN XL) 150 mg tablet Take 1 Tab by mouth every morning. Indications: ANXIETY WITH DEPRESSION 30 Tab 5    gabapentin (NEURONTIN) 100 mg capsule Take 1 Cap by mouth three (3) times daily. 90 Cap 3    lisinopril (PRINIVIL, ZESTRIL) 5 mg tablet Take 1 Tab by mouth daily. Indications: Diabetic Nephropathy, hypertension 30 Tab 5    ergocalciferol (ERGOCALCIFEROL) 50,000 unit capsule Take 1 Cap by mouth every seven (7) days. 12 Cap 3    carbamide peroxide (DEBROX) 6.5 % otic solution Administer 5 Drops into each ear two (2) times a day.  20 mL 0     No Known Allergies    Objective:  Visit Vitals    BP 90/60    Pulse 72    Temp 97.1 °F (36.2 °C) (Oral)    Resp 20    Ht 5' 3\" (1.6 m)    Wt 204 lb (92.5 kg)    LMP  (LMP Unknown)    SpO2 98%    BMI 36.14 kg/m2     Physical Exam:   General appearance - alert, oriented x 3, in no apparent distress  EYE-MITA, EOMI  ENT-ENT exam normal, no neck nodes or sinus tenderness  Neck - supple, no significant adenopathy   Chest - clear to auscultation, no wheezes, rales or rhonchi  Heart - normal rate, regular rhythm, normal blood pressure  Abdomen - soft, nontender, nondistended, no  organomegaly  Ext-peripheral pulses normal, no pedal edema  Neuro -alert, oriented, normal speech, no focal findings  Back-full range of motion, no tenderness, palpable spasm or pain on motion     Results for orders placed or performed in visit on 12/20/17   LIPID PANEL   Result Value Ref Range    Cholesterol, total 205 (H) 100 - 199 mg/dL    Triglyceride 221 (H) 0 - 149 mg/dL    HDL Cholesterol 46 >39 mg/dL    VLDL, calculated 44 (H) 5 - 40 mg/dL    LDL, calculated 115 (H) 0 - 99 mg/dL   METABOLIC PANEL, COMPREHENSIVE   Result Value Ref Range    Glucose 260 (H) 65 - 99 mg/dL    BUN 16 8 - 27 mg/dL    Creatinine 0.81 0.57 - 1.00 mg/dL    GFR est non-AA 78 >59 mL/min/1.73    GFR est AA 90 >59 mL/min/1.73    BUN/Creatinine ratio 20 12 - 28    Sodium 139 134 - 144 mmol/L    Potassium 4.4 3.5 - 5.2 mmol/L    Chloride 98 96 - 106 mmol/L    CO2 24 18 - 29 mmol/L    Calcium 9.3 8.7 - 10.3 mg/dL    Protein, total 7.0 6.0 - 8.5 g/dL    Albumin 4.0 3.6 - 4.8 g/dL    GLOBULIN, TOTAL 3.0 1.5 - 4.5 g/dL    A-G Ratio 1.3 1.2 - 2.2    Bilirubin, total 0.4 0.0 - 1.2 mg/dL    Alk. phosphatase 66 39 - 117 IU/L    AST (SGOT) 14 0 - 40 IU/L    ALT (SGPT) 17 0 - 32 IU/L   URINALYSIS W/ RFLX MICROSCOPIC   Result Value Ref Range    Specific Gravity 1.022 1.005 - 1.030    pH (UA) 5.0 5.0 - 7.5    Color Yellow Yellow    Appearance Cloudy (A) Clear    Leukocyte Esterase Trace (A) Negative    Protein Negative Negative/Trace    Glucose 1+ (A) Negative    Ketone Negative Negative    Blood Negative Negative    Bilirubin Negative Negative    Urobilinogen 0.2 0.2 - 1.0 mg/dL    Nitrites Negative Negative    Microscopic Examination See additional order    VITAMIN D, 25 HYDROXY   Result Value Ref Range    VITAMIN D, 25-HYDROXY 23.5 (L) 30.0 - 100.0 ng/mL   MICROSCOPIC EXAMINATION   Result Value Ref Range    WBC 0-5 0 - 5 /hpf    RBC None seen 0 - 2 /hpf    Epithelial cells >10 (A) 0 - 10 /hpf    Casts None seen None seen /lpf    Mucus Present Not Estab.     Bacteria Moderate (A) None seen/Few   AMB POC GLUCOSE BLOOD, BY GLUCOSE MONITORING DEVICE   Result Value Ref Range    Glucose  mg/dL   AMB POC HEMOGLOBIN A1C   Result Value Ref Range    Hemoglobin A1c (POC) 10.6 % Assessment/Plan:  Diagnoses and all orders for this visit:    DM type 2, uncontrolled, with neuropathy (HCC)  -     AMB POC HEMOGLOBIN A1C  -     AMB POC GLUCOSE BLOOD, BY GLUCOSE MONITORING DEVICE  -     Insulin Needles, Disposable, (BD ULTRA-FINE MINI PEN NEEDLE) 31 gauge x 3/16\" ndle; Use to inject insulin twice daily, Normal, Disp-200 Pen Needle, R-3  -     insulin aspart protamine/insulin aspart (NOVOLOG MIX 70-30FLEXPEN U-100) 100 unit/mL (70-30) inpn; 20 units at noon (first meal) and 40 units at 7 pm (second meal), Normal, Disp-10 Pen, R-6  -     metFORMIN (GLUCOPHAGE) 1,000 mg tablet; Take 1 Tab by mouth two (2) times daily (with meals). Indications: type 2 diabetes mellitus, Normal, Disp-60 Tab, R-7  -     MICROALBUMIN, UR, RAND W/ MICROALB/CREAT RATIO  -     METABOLIC PANEL, COMPREHENSIVE    Hypovitaminosis D    Hypercholesterolemia  -     LIPID PANEL    Pain in inferior right upper extremity    Swelling of arm  -     methocarbamol (ROBAXIN) 500 mg tablet; Take 1 Tab by mouth four (4) times daily. , Normal, Disp-20 Tab, R-3    Bilateral low back pain with right-sided sciatica, unspecified chronicity  -     methocarbamol (ROBAXIN) 500 mg tablet; Take 1 Tab by mouth four (4) times daily. , Normal, Disp-20 Tab, R-3  -     REFERRAL TO RHEUMATOLOGY  -     [facility]      Patient Instructions          Back Pain: Care Instructions  Your Care Instructions    Back pain has many possible causes. It is often related to problems with muscles and ligaments of the back. It may also be related to problems with the nerves, discs, or bones of the back. Moving, lifting, standing, sitting, or sleeping in an awkward way can strain the back. Sometimes you don't notice the injury until later. Arthritis is another common cause of back pain. Although it may hurt a lot, back pain usually improves on its own within several weeks. Most people recover in 12 weeks or less.  Using good home treatment and being careful not to stress your back can help you feel better sooner. Follow-up care is a key part of your treatment and safety. Be sure to make and go to all appointments, and call your doctor if you are having problems. It's also a good idea to know your test results and keep a list of the medicines you take. How can you care for yourself at home? · Sit or lie in positions that are most comfortable and reduce your pain. Try one of these positions when you lie down:  ¨ Lie on your back with your knees bent and supported by large pillows. ¨ Lie on the floor with your legs on the seat of a sofa or chair. Arn Closs on your side with your knees and hips bent and a pillow between your legs. ¨ Lie on your stomach if it does not make pain worse. · Do not sit up in bed, and avoid soft couches and twisted positions. Bed rest can help relieve pain at first, but it delays healing. Avoid bed rest after the first day of back pain. · Change positions every 30 minutes. If you must sit for long periods of time, take breaks from sitting. Get up and walk around, or lie in a comfortable position. · Try using a heating pad on a low or medium setting for 15 to 20 minutes every 2 or 3 hours. Try a warm shower in place of one session with the heating pad. · You can also try an ice pack for 10 to 15 minutes every 2 to 3 hours. Put a thin cloth between the ice pack and your skin. · Take pain medicines exactly as directed. ¨ If the doctor gave you a prescription medicine for pain, take it as prescribed. ¨ If you are not taking a prescription pain medicine, ask your doctor if you can take an over-the-counter medicine. · Take short walks several times a day. You can start with 5 to 10 minutes, 3 or 4 times a day, and work up to longer walks. Walk on level surfaces and avoid hills and stairs until your back is better. · Return to work and other activities as soon as you can.  Continued rest without activity is usually not good for your back.  · To prevent future back pain, do exercises to stretch and strengthen your back and stomach. Learn how to use good posture, safe lifting techniques, and proper body mechanics. When should you call for help? Call your doctor now or seek immediate medical care if:  ? · You have new or worsening numbness in your legs. ? · You have new or worsening weakness in your legs. (This could make it hard to stand up.)   ? · You lose control of your bladder or bowels. ? Watch closely for changes in your health, and be sure to contact your doctor if:  ? · Your pain gets worse. ? · You are not getting better after 2 weeks. Where can you learn more? Go to http://alley-serg.info/. Enter N823 in the search box to learn more about \"Back Pain: Care Instructions. \"  Current as of: March 21, 2017  Content Version: 11.4  © 7438-2193 [employer]. Care instructions adapted under license by [employer] (which disclaims liability or warranty for this information). If you have questions about a medical condition or this instruction, always ask your healthcare professional. [address] any warranty or liability for your use of this information. Follow-up Disposition:  Return in about 3 months (around 8/2/2018), or if symptoms worsen or fail to improve, for routine follow up.

## 2018-05-03 RX ORDER — GABAPENTIN 100 MG/1
100 CAPSULE ORAL 3 TIMES DAILY
Qty: 90 CAP | Refills: 3 | Status: SHIPPED | OUTPATIENT
Start: 2018-05-03 | End: 2018-11-06 | Stop reason: SDUPTHER

## 2018-05-09 LAB
ALBUMIN SERPL-MCNC: 3.8 G/DL (ref 3.6–4.8)
ALBUMIN/CREAT UR: 4.1 MG/G CREAT (ref 0–30)
ALBUMIN/GLOB SERPL: 1.3 {RATIO} (ref 1.2–2.2)
ALP SERPL-CCNC: 82 IU/L (ref 39–117)
ALT SERPL-CCNC: 19 IU/L (ref 0–32)
AST SERPL-CCNC: 11 IU/L (ref 0–40)
BILIRUB SERPL-MCNC: 0.2 MG/DL (ref 0–1.2)
BUN SERPL-MCNC: 16 MG/DL (ref 8–27)
BUN/CREAT SERPL: 18 (ref 12–28)
CALCIUM SERPL-MCNC: 9.2 MG/DL (ref 8.7–10.3)
CHLORIDE SERPL-SCNC: 98 MMOL/L (ref 96–106)
CHOLEST SERPL-MCNC: 211 MG/DL (ref 100–199)
CO2 SERPL-SCNC: 22 MMOL/L (ref 18–29)
CREAT SERPL-MCNC: 0.9 MG/DL (ref 0.57–1)
CREAT UR-MCNC: 96.1 MG/DL
GFR SERPLBLD CREATININE-BSD FMLA CKD-EPI: 69 ML/MIN/1.73
GFR SERPLBLD CREATININE-BSD FMLA CKD-EPI: 79 ML/MIN/1.73
GLOBULIN SER CALC-MCNC: 2.9 G/DL (ref 1.5–4.5)
GLUCOSE SERPL-MCNC: 369 MG/DL (ref 65–99)
HDLC SERPL-MCNC: 43 MG/DL
LDLC SERPL CALC-MCNC: 125 MG/DL (ref 0–99)
MICROALBUMIN UR-MCNC: 3.9 UG/ML
POTASSIUM SERPL-SCNC: 4.9 MMOL/L (ref 3.5–5.2)
PROT SERPL-MCNC: 6.7 G/DL (ref 6–8.5)
SODIUM SERPL-SCNC: 137 MMOL/L (ref 134–144)
TRIGL SERPL-MCNC: 213 MG/DL (ref 0–149)
VLDLC SERPL CALC-MCNC: 43 MG/DL (ref 5–40)

## 2018-08-06 ENCOUNTER — TELEPHONE (OUTPATIENT)
Dept: FAMILY MEDICINE CLINIC | Age: 63
End: 2018-08-06

## 2018-08-06 NOTE — TELEPHONE ENCOUNTER
----- Message from Ericka Agustin sent at 8/3/2018  4:48 PM EDT -----  Regarding: /Telephone   Pt needs a referral to orthopedic doctor. Pt needs a . Best contact number is [phone].

## 2018-08-09 ENCOUNTER — OFFICE VISIT (OUTPATIENT)
Dept: FAMILY MEDICINE CLINIC | Age: 63
End: 2018-08-09

## 2018-08-09 VITALS
SYSTOLIC BLOOD PRESSURE: 98 MMHG | BODY MASS INDEX: 36.32 KG/M2 | TEMPERATURE: 98.1 F | RESPIRATION RATE: 20 BRPM | WEIGHT: 205 LBS | OXYGEN SATURATION: 96 % | HEART RATE: 83 BPM | HEIGHT: 63 IN | DIASTOLIC BLOOD PRESSURE: 77 MMHG

## 2018-08-09 DIAGNOSIS — F33.9 MAJOR DEPRESSIVE DISORDER, RECURRENT EPISODE WITH ANXIOUS DISTRESS (HCC): ICD-10-CM

## 2018-08-09 DIAGNOSIS — G89.29 CHRONIC RADICULAR PAIN OF LOWER BACK: ICD-10-CM

## 2018-08-09 DIAGNOSIS — R35.0 FREQUENCY OF URINATION: ICD-10-CM

## 2018-08-09 DIAGNOSIS — Z78.9 IMPAIRED MOBILITY AND ADLS: ICD-10-CM

## 2018-08-09 DIAGNOSIS — Z74.09 IMPAIRED MOBILITY AND ADLS: ICD-10-CM

## 2018-08-09 DIAGNOSIS — M54.16 CHRONIC RADICULAR PAIN OF LOWER BACK: ICD-10-CM

## 2018-08-09 DIAGNOSIS — E55.9 HYPOVITAMINOSIS D: Primary | ICD-10-CM

## 2018-08-09 DIAGNOSIS — E78.00 HYPERCHOLESTEROLEMIA: ICD-10-CM

## 2018-08-09 LAB
GLUCOSE POC: 293 MG/DL
HBA1C MFR BLD HPLC: 11.6 %

## 2018-08-09 RX ORDER — INSULIN ASPART 100 [IU]/ML
INJECTION, SUSPENSION SUBCUTANEOUS
Qty: 10 PEN | Refills: 6 | Status: SHIPPED | OUTPATIENT
Start: 2018-08-09 | End: 2018-11-06 | Stop reason: SDUPTHER

## 2018-08-09 RX ORDER — METFORMIN HYDROCHLORIDE 1000 MG/1
1000 TABLET ORAL 2 TIMES DAILY WITH MEALS
Qty: 60 TAB | Refills: 7 | Status: SHIPPED | OUTPATIENT
Start: 2018-08-09 | End: 2018-11-06 | Stop reason: SDUPTHER

## 2018-08-09 NOTE — MR AVS SNAPSHOT
[facility] [address] [facility] 
[phone] Patient: Carla Redmond MRN: [id number] VIT:8/3/7408 Visit Information Date & Time Provider Department Dept. Phone Encounter #  
 8/9/2018  3:15 PM Daniel Hyatt MD [facility] at [address] [id number] Follow-up Instructions Return 2-3 weeks, for f/u results. Upcoming Health Maintenance Date Due  
 FOOT EXAM Q1 6/9/2016 FOBT Q 1 YEAR AGE 50-75 6/9/2016 EYE EXAM RETINAL OR DILATED Q1 4/18/2018 PAP AKA CERVICAL CYTOLOGY 6/9/2018 Influenza Age 5 to Adult 8/1/2018 HEMOGLOBIN A1C Q6M 11/2/2018 MICROALBUMIN Q1 5/8/2019 LIPID PANEL Q1 5/8/2019 BREAST CANCER SCRN MAMMOGRAM 6/9/2019 DTaP/Tdap/Td series (2 - Td) 6/6/2027 Allergies as of 8/9/2018  Review Complete On: 8/9/2018 By: Daniel Hyatt MD  
 Not on File Current Immunizations  Never Reviewed Name Date Influenza Vaccine (Quad) PF 12/20/2017  2:30 PM  
 Pneumococcal Polysaccharide (PPSV-23) 12/20/2017  2:30 PM  
  
 Not reviewed this visit You Were Diagnosed With   
  
 Codes Comments Hypovitaminosis D    -  Primary ICD-10-CM: E55.9 ICD-9-CM: 268.9 Hypercholesterolemia     ICD-10-CM: E78.00 ICD-9-CM: 272.0 DM type 2, uncontrolled, with neuropathy ([facility]     ICD-10-CM: E11.40, E11.65 ICD-9-CM: 250.62, 357.2 Frequency of urination     ICD-10-CM: R35.0 ICD-9-CM: 788.41 Chronic radicular pain of lower back     ICD-10-CM: M54.16, G89.29 ICD-9-CM: 724.4, 338.29 Impaired mobility and ADLs     ICD-10-CM: Z74.09 
ICD-9-CM: 799.89 Major depressive disorder, recurrent episode with anxious distress ([facility]     ICD-10-CM: F33.9 ICD-9-CM: 296.30 Vitals BP Pulse Temp Resp Height(growth percentile) Weight(growth percentile) 98/77 83 98.1 °F (36.7 °C) (Oral) 20 5' 3\" (1.6 m) 205 lb (93 kg) LMP SpO2 BMI OB Status Smoking Status (LMP Unknown) 96% 36.31 kg/m2 Hysterectomy Current Every Day Smoker Vitals History BMI and BSA Data Body Mass Index Body Surface Area  
 36.31 kg/m 2 2.03 m 2 Preferred Pharmacy Pharmacy Name Phone [id number], [address] [place] [address] [phone] Your Updated Medication List  
  
   
This list is accurate as of 8/9/18  4:15 PM.  Always use your most recent med list.  
  
  
  
  
 * Blood-Glucose Meter monitoring kit Commonly known as:  Freestyle Flash System Test 3 Times Daily   DX E11.40,  E11.65 * Blood-Glucose Meter monitoring kit Commonly known as:  FREESTYLE SYSTEM KIT Test 3 times daily  DX  E11.40,  E11.65  
  
 buPROPion  mg tablet Commonly known as:  Cohen  Take 1 Tab by mouth every morning. Indications: ANXIETY WITH DEPRESSION  
  
 gabapentin 100 mg capsule Commonly known as:  NEURONTIN Take 1 Cap by mouth three (3) times daily. glucose blood VI test strips strip Commonly known as:  FREESTYLE INSULINX Check sugar 3 times a day  
  
 insulin aspart protamine/insulin aspart 100 unit/mL (70-30) Inpn Commonly known as:  NovoLOG Mix 70-30FlexPen U-100  
20 units at noon (first meal) and 40 units at 7 pm (second meal) Insulin Needles (Disposable) 31 gauge x 3/16\" Ndle Commonly known as:  BD ULTRA-FINE MINI PEN NEEDLE Use to inject insulin twice daily * lancets 32 gauge Misc  
1 Each by Does Not Apply route three (3) times daily. * FREESTYLE LANCETS 28 gauge Misc Generic drug:  lancets TESTING 3 TIMES A DAY  
  
 metFORMIN 1,000 mg tablet Commonly known as:  GLUCOPHAGE Take 1 Tab by mouth two (2) times daily (with meals). Indications: type 2 diabetes mellitus * Notice: This list has 4 medication(s) that are the same as other medications prescribed for you.  Read the directions carefully, and ask your doctor or other care provider to review them with you. Prescriptions Sent to Pharmacy Refills  
 metFORMIN (GLUCOPHAGE) 1,000 mg tablet 7 Sig: Take 1 Tab by mouth two (2) times daily (with meals). Indications: type 2 diabetes mellitus Class: Normal  
 Pharmacy: [facility]/pharmacy #[id number] Ph #: [phone] Route: Oral  
 insulin aspart protamine/insulin aspart (NOVOLOG MIX 70-30FLEXPEN U-100) 100 unit/mL (70-30) inpn 6 Si units at noon (first meal) and 40 units at 7 pm (second meal) Class: Normal  
 Pharmacy: [facility]/pharmacy #[id number] Ph #: [phone] We Performed the Following AMB POC GLUCOSE BLOOD, BY GLUCOSE MONITORING DEVICE [95716 CPT(R)] AMB POC HEMOGLOBIN A1C [51700 CPT(R)] LIPID PANEL [11618 CPT(R)] METABOLIC PANEL, COMPREHENSIVE [76696 CPT(R)] [address] Comments:  
 PT/OP, help ADL REFERRAL TO PSYCHIATRY [REF91 Custom] URINALYSIS W/ RFLX MICROSCOPIC [82217 CPT(R)] VITAMIN D, 25 HYDROXY [id number] CPT(R)] Follow-up Instructions Return 2-3 weeks, for f/u results. To-Do List   
 2018 Imaging:  XR SPINE LUMB 2 OR 3 V   
  
 2018 Imaging:  XR SPINE THORAC 3 V Referral Information Referral ID Referred By Referred To  
  
 [id number] [facility] Visits Status Start Date End Date 1 New Request 18 If your referral has a status of pending review or denied, additional information will be sent to support the outcome of this decision. Referral ID Referred By Referred To  
 [id number]  W Venita Sheets MD  
   [facility] [address] [place], [address] Phone: [phone] Fax: [phone] Visits Status Start Date End Date 1 New Request 18 If your referral has a status of pending review or denied, additional information will be sent to support the outcome of this decision. Patient Instructions Back Pain: Care Instructions Your Care Instructions Back pain has many possible causes. It is often related to problems with muscles and ligaments of the back. It may also be related to problems with the nerves, discs, or bones of the back. Moving, lifting, standing, sitting, or sleeping in an awkward way can strain the back. Sometimes you don't notice the injury until later. Arthritis is another common cause of back pain. Although it may hurt a lot, back pain usually improves on its own within several weeks. Most people recover in 12 weeks or less. Using good home treatment and being careful not to stress your back can help you feel better sooner. Follow-up care is a key part of your treatment and safety. Be sure to make and go to all appointments, and call your doctor if you are having problems. It's also a good idea to know your test results and keep a list of the medicines you take. How can you care for yourself at home? · Sit or lie in positions that are most comfortable and reduce your pain. Try one of these positions when you lie down: ¨ Lie on your back with your knees bent and supported by large pillows. ¨ Lie on the floor with your legs on the seat of a sofa or chair. Ralene Melissa on your side with your knees and hips bent and a pillow between your legs. ¨ Lie on your stomach if it does not make pain worse. · Do not sit up in bed, and avoid soft couches and twisted positions. Bed rest can help relieve pain at first, but it delays healing. Avoid bed rest after the first day of back pain. · Change positions every 30 minutes. If you must sit for long periods of time, take breaks from sitting. Get up and walk around, or lie in a comfortable position.  
· Try using a heating pad on a low or medium setting for 15 to 20 minutes every 2 or 3 hours. Try a warm shower in place of one session with the heating pad. · You can also try an ice pack for 10 to 15 minutes every 2 to 3 hours. Put a thin cloth between the ice pack and your skin. · Take pain medicines exactly as directed. ¨ If the doctor gave you a prescription medicine for pain, take it as prescribed. ¨ If you are not taking a prescription pain medicine, ask your doctor if you can take an over-the-counter medicine. · Take short walks several times a day. You can start with 5 to 10 minutes, 3 or 4 times a day, and work up to longer walks. Walk on level surfaces and avoid hills and stairs until your back is better. · Return to work and other activities as soon as you can. Continued rest without activity is usually not good for your back. · To prevent future back pain, do exercises to stretch and strengthen your back and stomach. Learn how to use good posture, safe lifting techniques, and proper body mechanics. When should you call for help? Call your doctor now or seek immediate medical care if: 
  · You have new or worsening numbness in your legs.  
  · You have new or worsening weakness in your legs. (This could make it hard to stand up.)  
  · You lose control of your bladder or bowels.  
 Watch closely for changes in your health, and be sure to contact your doctor if: 
  · You have a fever, lose weight, or don't feel well.  
  · You do not get better as expected. Where can you learn more? Go to http://alley-serg.info/. Enter U824 in the search box to learn more about \"Back Pain: Care Instructions. \" Current as of: November 29, 2017 Content Version: 11.7 © 5639-1178 [employer]. Care instructions adapted under license by [employer] (which disclaims liability or warranty for this information).  If you have questions about a medical condition or this instruction, always ask your healthcare professional. [employer] disclaims any warranty or liability for your use of this information. Introducing [facility]! Sherman Reynoso introduces Teachernow patient portal. Now you can access parts of your medical record, email your doctor's office, and request medication refills online. 1. In your internet browser, go to [email or web address] 2. Click on the First Time User? Click Here link in the Sign In box. You will see the New Member Sign Up page. 3. Enter your Teachernow Access Code exactly as it appears below. You will not need to use this code after youve completed the sign-up process. If you do not sign up before the expiration date, you must request a new code. · Teachernow Access Code: [email or web address] Expires: 11/7/2018  4:15 PM 
 
4. Enter the last four digits of your Social Security Number (xxxx) and Date of Birth (mm/dd/yyyy) as indicated and click Submit. You will be taken to the next sign-up page. 5. Create a Teachernow ID. This will be your Teachernow login ID and cannot be changed, so think of one that is secure and easy to remember. 6. Create a Teachernow password. You can change your password at any time. 7. Enter your Password Reset Question and Answer. This can be used at a later time if you forget your password. 8. Enter your e-mail address. You will receive e-mail notification when new information is available in [address] 9. Click Sign Up. You can now view and download portions of your medical record. 10. Click the Download Summary menu link to download a portable copy of your medical information. If you have questions, please visit the Frequently Asked Questions section of the Teachernow website. Remember, Teachernow is NOT to be used for urgent needs. For medical emergencies, dial 911. Now available from your iPhone and Android! Please provide this summary of care documentation to your next provider. Your primary care clinician is listed as Esme Norman. If you have any questions after today's visit, please call [phone].

## 2018-08-09 NOTE — PROGRESS NOTES
Chief Complaint   Patient presents with    Difficulty Walking     HPI:obtained through  #[id number]  Rafa Stein is a 61 y.o. female with diabetes type 2 (a1c of 10.7% today), hypertension, hypercholesterolemia and diabetic neuropathy presents in wheelchair accompanied by daughter for follow up. Per daughter patient has difficulty walking due to chronic back pain and leg pain. Denies history injury/falls. symptoms have ongoing for 3-4 years. Patient c/o pain tried to get her out of wheelchair or stand. In the past patient has been referred to orthopedics, physical therapy. However, she is noncompliant with treatment regimen, follow up. Today, daughter is requesting [facility] nurse for night shift because patient needs help going to bath. Review of Systems  As per hpi    Past Medical History:   Diagnosis Date    Arthritis     shoulders and knees.  Diabetes ([address]     insulin dependent     Hypercholesterolemia     Hypertension      Past Surgical History:   Procedure Laterality Date    HX GYN      hysterectomy     Social History     Social History    Marital status:      Spouse name: N/A    Number of children: N/A    Years of education: N/A     Social History Main Topics    Smoking status: Current Every Day Smoker     Packs/day: 2.00     Years: 3.00    Smokeless tobacco: Never Used    Alcohol use No    Drug use: No    Sexual activity: Not Asked     Other Topics Concern    None     Social History Narrative     Family History   Problem Relation Age of Onset    Diabetes Brother      Current Outpatient Prescriptions   Medication Sig Dispense Refill    gabapentin (NEURONTIN) 100 mg capsule Take 1 Cap by mouth three (3) times daily.  90 Cap 3    FREESTYLE LANCETS 28 gauge misc TESTING 3 TIMES A DAY  2    Insulin Needles, Disposable, (BD ULTRA-FINE MINI PEN NEEDLE) 31 gauge x 3/16\" ndle Use to inject insulin twice daily 200 Pen Needle 3    insulin aspart protamine/insulin aspart (NOVOLOG MIX 70-30FLEXPEN U-100) 100 unit/mL (70-30) inpn 20 units at noon (first meal) and 40 units at 7 pm (second meal) 10 Pen 6    metFORMIN (GLUCOPHAGE) 1,000 mg tablet Take 1 Tab by mouth two (2) times daily (with meals). Indications: type 2 diabetes mellitus 60 Tab 7    glucose blood VI test strips (FREESTYLE INSULINX) strip Check sugar 3 times a day 300 Strip 4    lancets 32 gauge misc 1 Each by Does Not Apply route three (3) times daily. 300 Lancet 3    Blood-Glucose Meter (FREESTYLE SYSTEM KIT) monitoring kit Test 3 times daily  DX  E11.40,  E11.65 1 Kit 0    Blood-Glucose Meter (FREESTYLE FLASH SYSTEM) monitoring kit Test 3 Times Daily   DX E11.40,  E11.65 1 Kit 0    buPROPion XL (WELLBUTRIN XL) 150 mg tablet Take 1 Tab by mouth every morning.  Indications: ANXIETY WITH DEPRESSION 30 Tab 5     Not on File    Objective:  Visit Vitals    BP 98/77    Pulse 83    Temp 98.1 °F (36.7 °C) (Oral)    Resp 20    Ht 5' 3\" (1.6 m)    Wt 205 lb (93 kg)    LMP  (LMP Unknown)    SpO2 96%    BMI 36.31 kg/m2     Physical Exam:   General appearance - alert, well appearing in no distress  Neck - supple, no significant adenopathy   Chest - clear to auscultation, no wheezes, rales or rhonchi  Heart - normal rate, regular rhythm, normal blood pressure  Abdomen - soft, nontender, nondistended, no organomegaly  Ext-peripheral pulses normal, no pedal edema, upper arms are lumpy(?lipoma)  Neuro -alert, oriented, normal speech, no focal findings   Back-pain with motion noted during exam, tenderness noted over entire back   Knee-normal exam, no swelling    Results for orders placed or performed in visit on 05/02/18   MICROALBUMIN, UR, RAND W/ MICROALB/CREAT RATIO   Result Value Ref Range    Creatinine, urine 96.1 Not Estab. mg/dL    Microalbumin, urine 3.9 Not Estab. ug/mL    Microalb/Creat ratio (ug/mg creat.) 4.1 0.0 - 30.0 mg/g creat   LIPID PANEL   Result Value Ref Range    Cholesterol, total 211 (H) 100 - 199 mg/dL    Triglyceride 213 (H) 0 - 149 mg/dL    HDL Cholesterol 43 >39 mg/dL    VLDL, calculated 43 (H) 5 - 40 mg/dL    LDL, calculated 125 (H) 0 - 99 mg/dL   METABOLIC PANEL, COMPREHENSIVE   Result Value Ref Range    Glucose 369 (H) 65 - 99 mg/dL    BUN 16 8 - 27 mg/dL    Creatinine 0.90 0.57 - 1.00 mg/dL    GFR est non-AA 69 >59 mL/min/1.73    GFR est AA 79 >59 mL/min/1.73    BUN/Creatinine ratio 18 12 - 28    Sodium 137 134 - 144 mmol/L    Potassium 4.9 3.5 - 5.2 mmol/L    Chloride 98 96 - 106 mmol/L    CO2 22 18 - 29 mmol/L    Calcium 9.2 8.7 - 10.3 mg/dL    Protein, total 6.7 6.0 - 8.5 g/dL    Albumin 3.8 3.6 - 4.8 g/dL    GLOBULIN, TOTAL 2.9 1.5 - 4.5 g/dL    A-G Ratio 1.3 1.2 - 2.2    Bilirubin, total 0.2 0.0 - 1.2 mg/dL    Alk. phosphatase 82 39 - 117 IU/L    AST (SGOT) 11 0 - 40 IU/L    ALT (SGPT) 19 0 - 32 IU/L   AMB POC HEMOGLOBIN A1C   Result Value Ref Range    Hemoglobin A1c (POC) 10.7 %   AMB POC GLUCOSE BLOOD, BY GLUCOSE MONITORING DEVICE   Result Value Ref Range    Glucose  mg/dL       Assessment/Plan:  Diagnoses and all orders for this visit:    Hypovitaminosis D  -     VITAMIN D, 25 HYDROXY    Hypercholesterolemia  -     LIPID PANEL    DM type 2, uncontrolled, with neuropathy (HCC)  -     AMB POC GLUCOSE BLOOD, BY GLUCOSE MONITORING DEVICE  -     AMB POC HEMOGLOBIN F3T  -     METABOLIC PANEL, COMPREHENSIVE  -     metFORMIN (GLUCOPHAGE) 1,000 mg tablet; Take 1 Tab by mouth two (2) times daily (with meals).  Indications: type 2 diabetes mellitus, Normal, Disp-60 Tab, R-7  -     insulin aspart protamine/insulin aspart (NOVOLOG MIX 70-30FLEXPEN U-100) 100 unit/mL (70-30) inpn; 20 units at noon (first meal) and 40 units at 7 pm (second meal), Normal, Disp-10 Pen, R-6    Frequency of urination  -     URINALYSIS W/ RFLX MICROSCOPIC    Chronic radicular pain of lower back  -     REFERRAL TO HOME HEALTH  -     XR SPINE THORAC 3 V; Future  -     XR SPINE LUMB 2 OR 3 V; Future    Impaired mobility and ADLs  -     REFERRAL TO HOME HEALTH    Major depressive disorder, recurrent episode with anxious distress ([facility]  -     [address] [facility]      Patient Instructions          Back Pain: Care Instructions  Your Care Instructions    Back pain has many possible causes. It is often related to problems with muscles and ligaments of the back. It may also be related to problems with the nerves, discs, or bones of the back. Moving, lifting, standing, sitting, or sleeping in an awkward way can strain the back. Sometimes you don't notice the injury until later. Arthritis is another common cause of back pain. Although it may hurt a lot, back pain usually improves on its own within several weeks. Most people recover in 12 weeks or less. Using good home treatment and being careful not to stress your back can help you feel better sooner. Follow-up care is a key part of your treatment and safety. Be sure to make and go to all appointments, and call your doctor if you are having problems. It's also a good idea to know your test results and keep a list of the medicines you take. How can you care for yourself at home? · Sit or lie in positions that are most comfortable and reduce your pain. Try one of these positions when you lie down:  ¨ Lie on your back with your knees bent and supported by large pillows. ¨ Lie on the floor with your legs on the seat of a sofa or chair. Matilde Rodman on your side with your knees and hips bent and a pillow between your legs. ¨ Lie on your stomach if it does not make pain worse. · Do not sit up in bed, and avoid soft couches and twisted positions. Bed rest can help relieve pain at first, but it delays healing. Avoid bed rest after the first day of back pain. · Change positions every 30 minutes. If you must sit for long periods of time, take breaks from sitting. Get up and walk around, or lie in a comfortable position.   · Try using a heating pad on a low or medium setting for 15 to 20 minutes every 2 or 3 hours. Try a warm shower in place of one session with the heating pad. · You can also try an ice pack for 10 to 15 minutes every 2 to 3 hours. Put a thin cloth between the ice pack and your skin. · Take pain medicines exactly as directed. ¨ If the doctor gave you a prescription medicine for pain, take it as prescribed. ¨ If you are not taking a prescription pain medicine, ask your doctor if you can take an over-the-counter medicine. · Take short walks several times a day. You can start with 5 to 10 minutes, 3 or 4 times a day, and work up to longer walks. Walk on level surfaces and avoid hills and stairs until your back is better. · Return to work and other activities as soon as you can. Continued rest without activity is usually not good for your back. · To prevent future back pain, do exercises to stretch and strengthen your back and stomach. Learn how to use good posture, safe lifting techniques, and proper body mechanics. When should you call for help? Call your doctor now or seek immediate medical care if:    · You have new or worsening numbness in your legs.     · You have new or worsening weakness in your legs. (This could make it hard to stand up.)     · You lose control of your bladder or bowels.    Watch closely for changes in your health, and be sure to contact your doctor if:    · You have a fever, lose weight, or don't feel well.     · You do not get better as expected. Where can you learn more? Go to http://alley-serg.info/. Enter T260 in the search box to learn more about \"Back Pain: Care Instructions. \"  Current as of: November 29, 2017  Content Version: 11.7  © 4212-5529 [employer]. Care instructions adapted under license by [employer] (which disclaims liability or warranty for this information).  If you have questions about a medical condition or this instruction, always ask your healthcare professional. Maria Guadalupe Michaels disclaims any warranty or liability for your use of this information. Follow-up Disposition:  Return 2-3 weeks, for f/u results.

## 2018-08-09 NOTE — PATIENT INSTRUCTIONS
Back Pain: Care Instructions  Your Care Instructions    Back pain has many possible causes. It is often related to problems with muscles and ligaments of the back. It may also be related to problems with the nerves, discs, or bones of the back. Moving, lifting, standing, sitting, or sleeping in an awkward way can strain the back. Sometimes you don't notice the injury until later. Arthritis is another common cause of back pain. Although it may hurt a lot, back pain usually improves on its own within several weeks. Most people recover in 12 weeks or less. Using good home treatment and being careful not to stress your back can help you feel better sooner. Follow-up care is a key part of your treatment and safety. Be sure to make and go to all appointments, and call your doctor if you are having problems. It's also a good idea to know your test results and keep a list of the medicines you take. How can you care for yourself at home? · Sit or lie in positions that are most comfortable and reduce your pain. Try one of these positions when you lie down:  ¨ Lie on your back with your knees bent and supported by large pillows. ¨ Lie on the floor with your legs on the seat of a sofa or chair. Dallie Brando on your side with your knees and hips bent and a pillow between your legs. ¨ Lie on your stomach if it does not make pain worse. · Do not sit up in bed, and avoid soft couches and twisted positions. Bed rest can help relieve pain at first, but it delays healing. Avoid bed rest after the first day of back pain. · Change positions every 30 minutes. If you must sit for long periods of time, take breaks from sitting. Get up and walk around, or lie in a comfortable position. · Try using a heating pad on a low or medium setting for 15 to 20 minutes every 2 or 3 hours. Try a warm shower in place of one session with the heating pad. · You can also try an ice pack for 10 to 15 minutes every 2 to 3 hours.  Put a thin cloth between the ice pack and your skin. · Take pain medicines exactly as directed. ¨ If the doctor gave you a prescription medicine for pain, take it as prescribed. ¨ If you are not taking a prescription pain medicine, ask your doctor if you can take an over-the-counter medicine. · Take short walks several times a day. You can start with 5 to 10 minutes, 3 or 4 times a day, and work up to longer walks. Walk on level surfaces and avoid hills and stairs until your back is better. · Return to work and other activities as soon as you can. Continued rest without activity is usually not good for your back. · To prevent future back pain, do exercises to stretch and strengthen your back and stomach. Learn how to use good posture, safe lifting techniques, and proper body mechanics. When should you call for help? Call your doctor now or seek immediate medical care if:    · You have new or worsening numbness in your legs.     · You have new or worsening weakness in your legs. (This could make it hard to stand up.)     · You lose control of your bladder or bowels.    Watch closely for changes in your health, and be sure to contact your doctor if:    · You have a fever, lose weight, or don't feel well.     · You do not get better as expected. Where can you learn more? Go to http://alley-serg.info/. Enter X725 in the search box to learn more about \"Back Pain: Care Instructions. \"  Current as of: November 29, 2017  Content Version: 11.7  © 6382-7868 [employer]. Care instructions adapted under license by [employer] (which disclaims liability or warranty for this information). If you have questions about a medical condition or this instruction, always ask your healthcare professional. [place] [address] any warranty or liability for your use of this information.

## 2018-08-10 ENCOUNTER — HOSPITAL ENCOUNTER (OUTPATIENT)
Dept: GENERAL RADIOLOGY | Age: 63
Discharge: HOME | End: 2018-08-10
Payer: MEDICAID

## 2018-08-10 DIAGNOSIS — G89.29 CHRONIC RADICULAR PAIN OF LOWER BACK: ICD-10-CM

## 2018-08-10 DIAGNOSIS — M54.16 CHRONIC RADICULAR PAIN OF LOWER BACK: ICD-10-CM

## 2018-08-10 PROCEDURE — 72100 X-RAY EXAM L-S SPINE 2/3 VWS: CPT

## 2018-08-10 PROCEDURE — 72072 X-RAY EXAM THORAC SPINE 3VWS: CPT

## 2018-08-10 RX ORDER — LISINOPRIL 5 MG/1
5 TABLET ORAL DAILY
Qty: 90 TAB | Refills: 4 | Status: SHIPPED | OUTPATIENT
Start: 2018-08-10 | End: 2018-11-06 | Stop reason: SDUPTHER

## 2018-08-10 NOTE — TELEPHONE ENCOUNTER
----- Message from Eileen Hernandez sent at 8/10/2018  9:17 AM EDT -----  Regarding: Dr. Luisa Thornton Refill  Pts pharmacist, Rachid Mistry [facility] ([facility]) is requesting a refill for pt. Of \"Lisinopril 5mg\" to be sent to [facility] pharmacy on file.  Rachid Mistry [facility] [phone]

## 2018-08-10 NOTE — TELEPHONE ENCOUNTER
Last Visit: 5/2-Domah  Next Appt: None  Previous Refill Encounter: 12/20/17-30+5    Requested Prescriptions     Pending Prescriptions Disp Refills    lisinopril (PRINIVIL, ZESTRIL) 5 mg tablet 90 Tab 0     Sig: Take 1 Tab by mouth daily.

## 2018-08-11 LAB
25(OH)D3+25(OH)D2 SERPL-MCNC: 17.9 NG/ML (ref 30–100)
ALBUMIN SERPL-MCNC: 4.1 G/DL (ref 3.6–4.8)
ALBUMIN/GLOB SERPL: 1.5 {RATIO} (ref 1.2–2.2)
ALP SERPL-CCNC: 82 IU/L (ref 39–117)
ALT SERPL-CCNC: 20 IU/L (ref 0–32)
APPEARANCE UR: CLEAR
AST SERPL-CCNC: 19 IU/L (ref 0–40)
BILIRUB SERPL-MCNC: 0.3 MG/DL (ref 0–1.2)
BILIRUB UR QL STRIP: NEGATIVE
BUN SERPL-MCNC: 20 MG/DL (ref 8–27)
BUN/CREAT SERPL: 23 (ref 12–28)
CALCIUM SERPL-MCNC: 9.1 MG/DL (ref 8.7–10.3)
CHLORIDE SERPL-SCNC: 97 MMOL/L (ref 96–106)
CHOLEST SERPL-MCNC: 211 MG/DL (ref 100–199)
CO2 SERPL-SCNC: 25 MMOL/L (ref 20–29)
COLOR UR: YELLOW
CREAT SERPL-MCNC: 0.88 MG/DL (ref 0.57–1)
GLOBULIN SER CALC-MCNC: 2.7 G/DL (ref 1.5–4.5)
GLUCOSE SERPL-MCNC: 414 MG/DL (ref 65–99)
GLUCOSE UR QL: ABNORMAL
HDLC SERPL-MCNC: 38 MG/DL
HGB UR QL STRIP: NEGATIVE
KETONES UR QL STRIP: NEGATIVE
LDLC SERPL CALC-MCNC: ABNORMAL MG/DL (ref 0–99)
LEUKOCYTE ESTERASE UR QL STRIP: NEGATIVE
MICRO URNS: ABNORMAL
NITRITE UR QL STRIP: NEGATIVE
PH UR STRIP: 5 [PH] (ref 5–7.5)
POTASSIUM SERPL-SCNC: 4.8 MMOL/L (ref 3.5–5.2)
PROT SERPL-MCNC: 6.8 G/DL (ref 6–8.5)
PROT UR QL STRIP: NEGATIVE
SODIUM SERPL-SCNC: 135 MMOL/L (ref 134–144)
SP GR UR: 1.03 (ref 1–1.03)
TRIGL SERPL-MCNC: 425 MG/DL (ref 0–149)
UROBILINOGEN UR STRIP-MCNC: 0.2 MG/DL (ref 0.2–1)
VLDLC SERPL CALC-MCNC: ABNORMAL MG/DL (ref 5–40)

## 2018-08-23 ENCOUNTER — OFFICE VISIT (OUTPATIENT)
Dept: FAMILY MEDICINE CLINIC | Age: 63
End: 2018-08-23

## 2018-08-23 VITALS
TEMPERATURE: 97.6 F | WEIGHT: 203 LBS | HEIGHT: 63 IN | OXYGEN SATURATION: 98 % | BODY MASS INDEX: 35.97 KG/M2 | DIASTOLIC BLOOD PRESSURE: 72 MMHG | SYSTOLIC BLOOD PRESSURE: 111 MMHG | HEART RATE: 83 BPM | RESPIRATION RATE: 20 BRPM

## 2018-08-23 DIAGNOSIS — E55.9 HYPOVITAMINOSIS D: ICD-10-CM

## 2018-08-23 DIAGNOSIS — E78.2 MIXED HYPERCHOLESTEROLEMIA AND HYPERTRIGLYCERIDEMIA: ICD-10-CM

## 2018-08-23 DIAGNOSIS — M51.36 DDD (DEGENERATIVE DISC DISEASE), LUMBAR: ICD-10-CM

## 2018-08-23 LAB — GLUCOSE POC: 311 MG/DL

## 2018-08-23 RX ORDER — PRAVASTATIN SODIUM 20 MG/1
20 TABLET ORAL
Qty: 30 TAB | Refills: 3 | Status: SHIPPED | OUTPATIENT
Start: 2018-08-23 | End: 2018-11-06 | Stop reason: SDUPTHER

## 2018-08-23 RX ORDER — CHOLECALCIFEROL TAB 125 MCG (5000 UNIT) 125 MCG
5000 TAB ORAL DAILY
Qty: 90 TAB | Refills: 1 | Status: SHIPPED | OUTPATIENT
Start: 2018-08-23 | End: 2018-11-06 | Stop reason: SDUPTHER

## 2018-08-23 NOTE — MR AVS SNAPSHOT
Mario Mardinh Shaheen [address] [facility] 
[phone] Patient: Jennifer Campbell MRN: [id number] LCO:[id number]/[id number] Visit Information Date & Time Provider Department Dept. Phone Encounter #  
 8/23/2018  2:00 PM Sally Stevens MD [facility] at [address] [id number] Follow-up Instructions Return in about 3 months (around 11/23/2018), or if symptoms worsen or fail to improve, for routine follow up. Upcoming Health Maintenance Date Due  
 FOOT EXAM Q1 6/9/2016 FOBT Q 1 YEAR AGE 50-75 6/9/2016 EYE EXAM RETINAL OR DILATED Q1 4/18/2018 PAP AKA CERVICAL CYTOLOGY 6/9/2018 Influenza Age 5 to Adult 8/1/2018 HEMOGLOBIN A1C Q6M 2/9/2019 MICROALBUMIN Q1 5/8/2019 BREAST CANCER SCRN MAMMOGRAM 6/9/2019 LIPID PANEL Q1 8/10/2019 DTaP/Tdap/Td series (2 - Td) 6/6/2027 Allergies as of 8/23/2018  Review Complete On: 8/23/2018 By: Sally Stevens MD  
 No Known Allergies Current Immunizations  Never Reviewed Name Date Influenza Vaccine (Quad) PF 12/20/2017  2:30 PM  
 Pneumococcal Polysaccharide (PPSV-23) 12/20/2017  2:30 PM  
  
 Not reviewed this visit You Were Diagnosed With   
  
 Codes Comments DM type 2, uncontrolled, [facility]    -  Primary ICD-10-CM: E11.40, E11.65 ICD-9-CM: 250.62, 357.2 Hypovitaminosis D     ICD-10-CM: E55.9 ICD-9-CM: 268.9 Mixed hypercholesterolemia and hypertriglyceridemia     ICD-10-CM: E78.2 ICD-9-CM: 272.2 DDD (degenerative disc disease), lumbar     ICD-10-CM: M51.36 
ICD-9-CM: 722.52 Vitals BP Pulse Temp Resp Height(growth percentile) Weight(growth percentile) 111/72 83 97.6 °F (36.4 °C) (Oral) 20 5' 3\" (1.6 m) 203 lb (92.1 kg) LMP SpO2 BMI OB Status Smoking Status (LMP Unknown) 98% 35.96 kg/m2 Hysterectomy Current Every Day Smoker Vitals History BMI and BSA Data Body Mass Index Body Surface Area 35.96 kg/m 2 2.02 m 2 Preferred Pharmacy Pharmacy Name Phone [id number] [address] [phone] Your Updated Medication List  
  
   
This list is accurate as of 8/23/18  3:37 PM.  Always use your most recent med list.  
  
  
  
  
 * Blood-Glucose Meter monitoring kit Commonly known as:  Freestyle Flash System Test 3 Times Daily   DX E11.40,  E11.65 * Blood-Glucose Meter monitoring kit Commonly known as:  FREESTYLE SYSTEM KIT Test 3 times daily  DX  E11.40,  E11.65  
  
 buPROPion  mg tablet Commonly known as:  Jack Diann Take 1 Tab by mouth every morning. Indications: ANXIETY WITH DEPRESSION  
  
 cholecalciferol (VITAMIN D3) 5,000 unit Tab tablet Commonly known as:  VITAMIN D3 Take 1 Tab by mouth daily. gabapentin 100 mg capsule Commonly known as:  NEURONTIN Take 1 Cap by mouth three (3) times daily. glucose blood VI test strips strip Commonly known as:  FREESTYLE INSULINX Check sugar 3 times a day  
  
 insulin aspart protamine/insulin aspart 100 unit/mL (70-30) Inpn Commonly known as:  NovoLOG Mix 70-30FlexPen U-100  
20 units at noon (first meal) and 40 units at 7 pm (second meal) Insulin Needles (Disposable) 31 gauge x 3/16\" Ndle Commonly known as:  BD ULTRA-FINE MINI PEN NEEDLE Use to inject insulin twice daily * lancets 32 gauge Misc  
1 Each by Does Not Apply route three (3) times daily. * FREESTYLE LANCETS 28 gauge Misc Generic drug:  lancets TESTING 3 TIMES A DAY  
  
 lisinopril 5 mg tablet Commonly known as:  [place] Take 1 Tab by mouth daily. metFORMIN 1,000 mg tablet Commonly known as:  GLUCOPHAGE Take 1 Tab by mouth two (2) times daily (with meals). Indications: type 2 diabetes mellitus  
  
 pravastatin 20 mg tablet Commonly known as:  PRAVACHOL Take 1 Tab by mouth nightly. * Notice: This list has 4 medication(s) that are the same as other medications prescribed for you. Read the directions carefully, and ask your doctor or other care provider to review them with you. Prescriptions Sent to Pharmacy Refills  
 cholecalciferol, VITAMIN D3, (VITAMIN D3) 5,000 unit tab tablet 1 Sig: Take 1 Tab by mouth daily. Class: Normal  
 Pharmacy: [id number]- [address] Ph #: [phone] Route: Oral  
 pravastatin (PRAVACHOL) 20 mg tablet 3 Sig: Take 1 Tab by mouth nightly. Class: Normal  
 Pharmacy: [facility] #[id number]- [address] Ph #: [phone] Route: Oral  
  
We Performed the Following AMB POC GLUCOSE BLOOD, BY GLUCOSE MONITORING DEVICE [29730 CPT(R)] REFERRAL TO ORTHOPEDICS [GOB722 Custom] Comments:  
 Please evaluate for lower back pain with DDD present on xr Follow-up Instructions Return in about 3 months (around 11/23/2018), or if symptoms worsen or fail to improve, for routine follow up. Referral Information Referral ID Referred By Referred To  
  
 [id number] [facility], [facility] [address] Phone: [phone] Visits Status Start Date End Date 1 New Request 8/23/18 8/23/19 If your referral has a status of pending review or denied, additional information will be sent to support the outcome of this decision. Patient Instructions Learning About Vitamin D Why is it important to get enough vitamin D? Your body needs vitamin D to absorb calcium. Calcium keeps your bones and muscles, including your heart, healthy and strong. If your muscles don't get enough calcium, they can cramp, hurt, or feel weak. You may have long-term (chronic) muscle aches and pains. If you don't get enough vitamin D throughout life, you have an increased chance of having thin and brittle bones (osteoporosis) in your later years. Children who don't get enough vitamin D may not grow as much as others their age. They also have a chance of getting a rare disease called rickets. It causes weak bones. Vitamin D and calcium are added to many foods. And your body uses sunshine to make its own vitamin D. How much vitamin D do you need? The [place] of Medicine recommends that people ages 3 through 79 get 600 IU (international units) every day. Adults 71 and older need 800 IU every day. Blood tests for vitamin D can check your vitamin D level. But there is no standard normal range used by all laboratories. The [place] of Medicine recommends a blood level of 20 ng/mL of vitamin D for healthy bones. And most people in the [place] and [facility] ([facility]) meet this goal. 
How can you get more vitamin D? Foods that contain vitamin D include: 
· [place], tuna, and mackerel. These are some of the best foods to eat when you need to get more vitamin D. 
· Cheese, egg yolks, and beef liver. These foods have vitamin D in small amounts. · Milk, soy drinks, orange juice, yogurt, margarine, and some kinds of cereal have vitamin D added to them. Some people don't make vitamin D as well as others. They may have to take extra care in getting enough vitamin D. Things that reduce how much vitamin D your body makes include: · Dark skin, such as many  Americans have. · Age, especially if you are older than 72. · Digestive problems, such as Crohn's or celiac disease. · Liver and kidney disease. Some people who do not get enough vitamin D may need supplements. Are there any risks from taking vitamin D? 
· Too much vitamin D: 
¨ Can damage your kidneys. ¨ Can cause nausea and vomiting, constipation, and weakness. ¨ Raises the amount of calcium in your blood.  If this happens, you can get confused or have an irregular heart rhythm. · Vitamin D may interact with other medicines. Tell your doctor about all of the medicines you take, including over-the-counter drugs, herbs, and pills. Tell your doctor about all of your current medical problems. Where can you learn more? Go to http://beulah.info/. Enter [id number] in the search box to learn more about \"Learning About Vitamin D.\" 
Current as of: May 12, 2017 Content Version: 11.7 © 8730-9837 [employer]. Care instructions adapted under license by [employer] (which disclaims liability or warranty for this information). If you have questions about a medical condition or this instruction, always ask your healthcare professional. [address] any warranty or liability for your use of this information. Introducing [facility]! [employer] introduces QRcao patient portal. Now you can access parts of your medical record, email your doctor's office, and request medication refills online. 1. In your internet browser, go to [email or web address] 2. Click on the First Time User? Click Here link in the Sign In box. You will see the New Member Sign Up page. 3. Enter your QRcao Access Code exactly as it appears below. You will not need to use this code after youve completed the sign-up process. If you do not sign up before the expiration date, you must request a new code. · QRcao Access Code: [email or web address] Expires: 11/7/2018  4:15 PM 
 
4. Enter the last four digits of your Social Security Number (xxxx) and Date of Birth (mm/dd/yyyy) as indicated and click Submit. You will be taken to the next sign-up page. 5. Create a QRcao ID. This will be your QRcao login ID and cannot be changed, so think of one that is secure and easy to remember. 6. Create a DFinet password. You can change your password at any time. 7. Enter your Password Reset Question and Answer. This can be used at a later time if you forget your password. 8. Enter your e-mail address. You will receive e-mail notification when new information is available in [address] 9. Click Sign Up. You can now view and download portions of your medical record. 10. Click the Download Summary menu link to download a portable copy of your medical information. If you have questions, please visit the Frequently Asked Questions section of the [employer] website. Remember, [employer] is NOT to be used for urgent needs. For medical emergencies, dial 911. Now available from your iPhone and Android! Please provide this summary of care documentation to your next provider. Your primary care clinician is listed as Ricci Robertson. If you have any questions after today's visit, please call [phone].

## 2018-08-23 NOTE — PROGRESS NOTES
Chief Complaint   Patient presents with    Results     2 week follow on results     HPI:  Román Yeboah is a 61 y.o. female presents for 2 week follow up to review lab results   SerumVit d level is still  low, A1C shows poorly controlled diabetes, urine has 3+ protin, triglyceride is very high  Xray lumbar spine shows very minimal degenerative disc disease. However, daughter claims patient is not able walk because of lesion on spine  Plan: refer to orthopedics  Results and management have been discussed. Per daughter she has appointment to see an endocrinologist.  Also, daughter is asking for home health nursing to cover nights. Explained to patient that her insurance will not cover service. Review of Systems  As per hpi    Past Medical History:   Diagnosis Date    Arthritis     shoulders and knees.  Diabetes ([address]     insulin dependent     Hypercholesterolemia     Hypertension      Past Surgical History:   Procedure Laterality Date    HX GYN      hysterectomy     Social History     Social History    Marital status:      Spouse name: N/A    Number of children: N/A    Years of education: N/A     Social History Main Topics    Smoking status: Current Every Day Smoker     Packs/day: 2.00     Years: 3.00    Smokeless tobacco: Never Used    Alcohol use No    Drug use: No    Sexual activity: Not Asked     Other Topics Concern    None     Social History Narrative     Family History   Problem Relation Age of Onset    Diabetes Brother      Current Outpatient Prescriptions   Medication Sig Dispense Refill    lisinopril (PRINIVIL, ZESTRIL) 5 mg tablet Take 1 Tab by mouth daily. 90 Tab 4    metFORMIN (GLUCOPHAGE) 1,000 mg tablet Take 1 Tab by mouth two (2) times daily (with meals).  Indications: type 2 diabetes mellitus 60 Tab 7    insulin aspart protamine/insulin aspart (NOVOLOG MIX 70-30FLEXPEN U-100) 100 unit/mL (70-30) inpn 20 units at noon (first meal) and 40 units at 7 pm (second meal) 10 Pen 6    gabapentin (NEURONTIN) 100 mg capsule Take 1 Cap by mouth three (3) times daily. 90 Cap 3    FREESTYLE LANCETS 28 gauge misc TESTING 3 TIMES A DAY  2    Insulin Needles, Disposable, (BD ULTRA-FINE MINI PEN NEEDLE) 31 gauge x 3/16\" ndle Use to inject insulin twice daily 200 Pen Needle 3    glucose blood VI test strips (FREESTYLE INSULINX) strip Check sugar 3 times a day 300 Strip 4    lancets 32 gauge misc 1 Each by Does Not Apply route three (3) times daily. 300 Lancet 3    Blood-Glucose Meter (FREESTYLE SYSTEM KIT) monitoring kit Test 3 times daily  DX  E11.40,  E11.65 1 Kit 0    Blood-Glucose Meter (FREESTYLE FLASH SYSTEM) monitoring kit Test 3 Times Daily   DX E11.40,  E11.65 1 Kit 0    buPROPion XL (WELLBUTRIN XL) 150 mg tablet Take 1 Tab by mouth every morning.  Indications: ANXIETY WITH DEPRESSION 30 Tab 5     No Known Allergies    Objective:  Visit Vitals    /72    Pulse 83    Temp 97.6 °F (36.4 °C) (Oral)    Resp 20    Ht 5' 3\" (1.6 m)    Wt 203 lb (92.1 kg)    LMP  (LMP Unknown)    SpO2 98%    BMI 35.96 kg/m2     Physical Exam:   General appearance - alert, oriented x 3 in no distress  EYE-PERRL, EOMI  Chest - clear to auscultation, no wheezes, rales or rhonchi  Heart - normal rate, regular rhythm, normal blood pressure  Abdomen - soft, nontender, nondistended, no organomegaly  Ext-peripheral pulses normal, no pedal edema  Neuro -alert, oriented, normal speech, no focal findings  Back-limited range of motion, pain with motion noted during exam     Results for orders placed or performed in visit on 08/09/18   VITAMIN D, 25 HYDROXY   Result Value Ref Range    VITAMIN D, 25-HYDROXY 17.9 (L) 30.0 - 100.0 ng/mL   URINALYSIS W/ RFLX MICROSCOPIC   Result Value Ref Range    Specific Gravity 1.029 1.005 - 1.030    pH (UA) 5.0 5.0 - 7.5    Color Yellow Yellow    Appearance Clear Clear    Leukocyte Esterase Negative Negative    Protein Negative Negative/Trace    Glucose 3+ (A) Negative Ketone Negative Negative    Blood Negative Negative    Bilirubin Negative Negative    Urobilinogen 0.2 0.2 - 1.0 mg/dL    Nitrites Negative Negative    Microscopic Examination Comment    LIPID PANEL   Result Value Ref Range    Cholesterol, total 211 (H) 100 - 199 mg/dL    Triglyceride 425 (H) 0 - 149 mg/dL    HDL Cholesterol 38 (L) >39 mg/dL    VLDL, calculated Comment 5 - 40 mg/dL    LDL, calculated Comment 0 - 99 mg/dL   METABOLIC PANEL, COMPREHENSIVE   Result Value Ref Range    Glucose 414 (H) 65 - 99 mg/dL    BUN 20 8 - 27 mg/dL    Creatinine 0.88 0.57 - 1.00 mg/dL    GFR est non-AA 70 >59 mL/min/1.73    GFR est AA 81 >59 mL/min/1.73    BUN/Creatinine ratio 23 12 - 28    Sodium 135 134 - 144 mmol/L    Potassium 4.8 3.5 - 5.2 mmol/L    Chloride 97 96 - 106 mmol/L    CO2 25 20 - 29 mmol/L    Calcium 9.1 8.7 - 10.3 mg/dL    Protein, total 6.8 6.0 - 8.5 g/dL    Albumin 4.1 3.6 - 4.8 g/dL    GLOBULIN, TOTAL 2.7 1.5 - 4.5 g/dL    A-G Ratio 1.5 1.2 - 2.2    Bilirubin, total 0.3 0.0 - 1.2 mg/dL    Alk. phosphatase 82 39 - 117 IU/L    AST (SGOT) 19 0 - 40 IU/L    ALT (SGPT) 20 0 - 32 IU/L   AMB POC GLUCOSE BLOOD, BY GLUCOSE MONITORING DEVICE   Result Value Ref Range    Glucose  mg/dL   AMB POC HEMOGLOBIN A1C   Result Value Ref Range    Hemoglobin A1c (POC) 11.6 %     Assessment/Plan:  Diagnoses and all orders for this visit:    DM type 2, uncontrolled, with neuropathy (HCC)  -     AMB POC GLUCOSE BLOOD, BY GLUCOSE MONITORING DEVICE    Hypovitaminosis D  -     cholecalciferol, VITAMIN D3, (VITAMIN D3) 5,000 unit tab tablet; Take 1 Tab by mouth daily. , Normal, Disp-90 Tab, R-1    Mixed hypercholesterolemia and hypertriglyceridemia  -     pravastatin (PRAVACHOL) 20 mg tablet; Take 1 Tab by mouth nightly., Normal, Disp-30 Tab, R-3    DDD (degenerative disc disease), lumbar  -     REFERRAL TO ORTHOPEDICS      Patient Instructions        Learning About Vitamin D  Why is it important to get enough vitamin D?     Your body needs vitamin D to absorb calcium. Calcium keeps your bones and muscles, including your heart, healthy and strong. If your muscles don't get enough calcium, they can cramp, hurt, or feel weak. You may have long-term (chronic) muscle aches and pains. If you don't get enough vitamin D throughout life, you have an increased chance of having thin and brittle bones (osteoporosis) in your later years. Children who don't get enough vitamin D may not grow as much as others their age. They also have a chance of getting a rare disease called rickets. It causes weak bones. Vitamin D and calcium are added to many foods. And your body uses sunshine to make its own vitamin D. How much vitamin D do you need? The [place] of Medicine recommends that people ages 3 through 79 get 600 IU (international units) every day. Adults 71 and older need 800 IU every day. Blood tests for vitamin D can check your vitamin D level. But there is no standard normal range used by all laboratories. The [place] of Medicine recommends a blood level of 20 ng/mL of vitamin D for healthy bones. And most people in the [place] and [facility] ([facility]) meet this goal.  How can you get more vitamin D? Foods that contain vitamin D include:  · [place], tuna, and mackerel. These are some of the best foods to eat when you need to get more vitamin D.  · Cheese, egg yolks, and beef liver. These foods have vitamin D in small amounts. · Milk, soy drinks, orange juice, yogurt, margarine, and some kinds of cereal have vitamin D added to them. Some people don't make vitamin D as well as others. They may have to take extra care in getting enough vitamin D. Things that reduce how much vitamin D your body makes include:  · Dark skin, such as many  Americans have. · Age, especially if you are older than 72. · Digestive problems, such as Crohn's or celiac disease. · Liver and kidney disease.   Some people who do not get enough vitamin D may need supplements. Are there any risks from taking vitamin D?  · Too much vitamin D:  ¨ Can damage your kidneys. ¨ Can cause nausea and vomiting, constipation, and weakness. ¨ Raises the amount of calcium in your blood. If this happens, you can get confused or have an irregular heart rhythm. · Vitamin D may interact with other medicines. Tell your doctor about all of the medicines you take, including over-the-counter drugs, herbs, and pills. Tell your doctor about all of your current medical problems. Where can you learn more? Go to http://alley-serg.info/. Enter [id number] in the search box to learn more about \"Learning About Vitamin D.\"  Current as of: May 12, 2017  Content Version: 11.7  © 7242-8057 [employer], [email or web address]. Care instructions adapted under license by [employer] (which disclaims liability or warranty for this information). If you have questions about a medical condition or this instruction, always ask your healthcare professional. [place] [address] any warranty or liability for your use of this information. Follow-up Disposition:  Return in about 3 months (around 11/23/2018), or if symptoms worsen or fail to improve, for routine follow up.

## 2018-08-23 NOTE — LETTER
8/23/2018 3:12 PM 
 
Ms. Thompson [phone] [id number] Dear Donna Hancock: 
 
Please find your most recent results below. Vit d level is low, A1C shows poorly controlled diabetes, urine has 3+ , triglyceride is very high Resulted Orders AMB POC GLUCOSE BLOOD, BY GLUCOSE MONITORING DEVICE Result Value Ref Range Glucose  mg/dL AMB POC HEMOGLOBIN A1C Result Value Ref Range Hemoglobin A1c (POC) 11.6 % VITAMIN D, 25 HYDROXY Result Value Ref Range VITAMIN D, 25-HYDROXY 17.9 (L) 30.0 - 100.0 ng/mL Comment:  
   Vitamin D deficiency has been defined by the [address] [place] [address] practice guideline as a 
level of serum 25-OH vitamin D less than 20 ng/mL (1,2). The Endocrine Society went on to further define vitamin D 
insufficiency as a level between 21 and 29 ng/mL (2). 1. IOM ([place] of Medicine). 2010. Dietary reference 
   intakes for calcium and D. [facility]: The 
   [employer]. 2. Bernadette MF, Serene NC, Lashawn JENSEN, et al. 
   Evaluation, treatment, and prevention of vitamin D 
   deficiency: an Endocrine Society clinical practice 
   guideline. JCEM. 2011 Jul; 96(7):1911-30. Narrative Performed at:  [address]  [id number] : Emiliana Flores MD, Phone:  [phone] URINALYSIS W/ RFLX MICROSCOPIC Result Value Ref Range Specific Gravity 1.029 1.005 - 1.030  
 pH (UA) 5.0 5.0 - 7.5 Color Yellow Yellow Appearance Clear Clear Leukocyte Esterase Negative Negative Protein Negative Negative/Trace Glucose 3+ (A) Negative Ketone Negative Negative Blood Negative Negative Bilirubin Negative Negative Urobilinogen 0.2 0.2 - 1.0 mg/dL Nitrites Negative Negative Microscopic Examination Comment Comment:  
   Microscopic not indicated and not performed. Narrative Performed at:  [address]  [id number] : Marsha Bailey MD, Phone:  [phone] LIPID PANEL Result Value Ref Range Cholesterol, total 211 (H) 100 - 199 mg/dL Triglyceride 425 (H) 0 - 149 mg/dL HDL Cholesterol 38 (L) >39 mg/dL VLDL, calculated Comment 5 - 40 mg/dL Comment:  
   The calculation for the VLDL cholesterol is not valid when 
triglyceride level is >400 mg/dL. LDL, calculated Comment 0 - 99 mg/dL Comment:  
   Triglyceride result indicated is too high for an accurate LDL 
cholesterol estimation. Narrative Performed at:  [address]  [id number] : Marsha Bailey MD, Phone:  [phone] METABOLIC PANEL, COMPREHENSIVE Result Value Ref Range Glucose 414 (H) 65 - 99 mg/dL BUN 20 8 - 27 mg/dL Creatinine 0.88 0.57 - 1.00 mg/dL GFR est non-AA 70 >59 mL/min/1.73 GFR est AA 81 >59 mL/min/1.73  
 BUN/Creatinine ratio 23 12 - 28 Sodium 135 134 - 144 mmol/L Potassium 4.8 3.5 - 5.2 mmol/L Chloride 97 96 - 106 mmol/L  
 CO2 25 20 - 29 mmol/L Calcium 9.1 8.7 - 10.3 mg/dL Protein, total 6.8 6.0 - 8.5 g/dL Albumin 4.1 3.6 - 4.8 g/dL GLOBULIN, TOTAL 2.7 1.5 - 4.5 g/dL A-G Ratio 1.5 1.2 - 2.2 Bilirubin, total 0.3 0.0 - 1.2 mg/dL Alk. phosphatase 82 39 - 117 IU/L  
 AST (SGOT) 19 0 - 40 IU/L  
 ALT (SGPT) 20 0 - 32 IU/L Narrative Performed at:  [address]  [id number] : Marsha Bailey MD, Phone:  [phone] RECOMMENDATIONS: 
Continue with current  diet and medications. Please call me if you have any questions: [phone] Sincerely, Yakov Fraser MD

## 2018-08-23 NOTE — PATIENT INSTRUCTIONS
Learning About Vitamin D  Why is it important to get enough vitamin D? Your body needs vitamin D to absorb calcium. Calcium keeps your bones and muscles, including your heart, healthy and strong. If your muscles don't get enough calcium, they can cramp, hurt, or feel weak. You may have long-term (chronic) muscle aches and pains. If you don't get enough vitamin D throughout life, you have an increased chance of having thin and brittle bones (osteoporosis) in your later years. Children who don't get enough vitamin D may not grow as much as others their age. They also have a chance of getting a rare disease called rickets. It causes weak bones. Vitamin D and calcium are added to many foods. And your body uses sunshine to make its own vitamin D. How much vitamin D do you need? The [place] of Medicine recommends that people ages 3 through 79 get 600 IU (international units) every day. Adults 71 and older need 800 IU every day. Blood tests for vitamin D can check your vitamin D level. But there is no standard normal range used by all laboratories. The [place] of Medicine recommends a blood level of 20 ng/mL of vitamin D for healthy bones. And most people in the [place] and [facility] meet this goal.  How can you get more vitamin D? Foods that contain vitamin D include:  · [place], tuna, and mackerel. These are some of the best foods to eat when you need to get more vitamin D.  · Cheese, egg yolks, and beef liver. These foods have vitamin D in small amounts. · Milk, soy drinks, orange juice, yogurt, margarine, and some kinds of cereal have vitamin D added to them. Some people don't make vitamin D as well as others. They may have to take extra care in getting enough vitamin D. Things that reduce how much vitamin D your body makes include:  · Dark skin, such as many  Americans have. · Age, especially if you are older than 72. · Digestive problems, such as Crohn's or celiac disease.   · Liver and kidney disease. Some people who do not get enough vitamin D may need supplements. Are there any risks from taking vitamin D?  · Too much vitamin D:  ¨ Can damage your kidneys. ¨ Can cause nausea and vomiting, constipation, and weakness. ¨ Raises the amount of calcium in your blood. If this happens, you can get confused or have an irregular heart rhythm. · Vitamin D may interact with other medicines. Tell your doctor about all of the medicines you take, including over-the-counter drugs, herbs, and pills. Tell your doctor about all of your current medical problems. Where can you learn more? Go to http://[employer].info/. Enter [id number] in the search box to learn more about \"Learning About Vitamin D.\"  Current as of: May 12, 2017  Content Version: 11.7  © 7264-0574 Healthwise, Incorporated. Care instructions adapted under license by [employer] (which disclaims liability or warranty for this information). If you have questions about a medical condition or this instruction, always ask your healthcare professional. [address] any warranty or liability for your use of this information.

## 2018-10-12 ENCOUNTER — TELEPHONE (OUTPATIENT)
Dept: FAMILY MEDICINE CLINIC | Age: 63
End: 2018-10-12

## 2018-10-12 NOTE — TELEPHONE ENCOUNTER
----- Message from Jackie Mayes sent at 10/12/2018  1:42 PM EDT -----  Regarding: Dr. Evelyn Bourne a RN at [facility] is requesting office notes and labs faxed to her at [phone] her phone number is [phone]. She is trying to increase her hours.

## 2018-10-30 ENCOUNTER — OFFICE VISIT (OUTPATIENT)
Dept: INTERNAL MEDICINE CLINIC | Age: 63
End: 2018-10-30

## 2018-10-30 ENCOUNTER — TELEPHONE (OUTPATIENT)
Dept: INTERNAL MEDICINE CLINIC | Age: 63
End: 2018-10-30

## 2018-10-30 VITALS
HEIGHT: 64 IN | TEMPERATURE: 96.5 F | RESPIRATION RATE: 18 BRPM | WEIGHT: 204.1 LBS | SYSTOLIC BLOOD PRESSURE: 121 MMHG | BODY MASS INDEX: 34.85 KG/M2 | HEART RATE: 89 BPM | DIASTOLIC BLOOD PRESSURE: 70 MMHG | OXYGEN SATURATION: 95 %

## 2018-10-30 DIAGNOSIS — Z12.11 ENCOUNTER FOR SCREENING COLONOSCOPY: ICD-10-CM

## 2018-10-30 DIAGNOSIS — Z12.31 SCREENING MAMMOGRAM, ENCOUNTER FOR: ICD-10-CM

## 2018-10-30 DIAGNOSIS — E78.5 HYPERLIPIDEMIA, UNSPECIFIED HYPERLIPIDEMIA TYPE: ICD-10-CM

## 2018-10-30 DIAGNOSIS — Z78.9 NON-ENGLISH SPEAKING PATIENT: ICD-10-CM

## 2018-10-30 DIAGNOSIS — M54.41 CHRONIC BILATERAL LOW BACK PAIN WITH BILATERAL SCIATICA: ICD-10-CM

## 2018-10-30 DIAGNOSIS — R06.09 DOE (DYSPNEA ON EXERTION): ICD-10-CM

## 2018-10-30 DIAGNOSIS — M54.42 CHRONIC BILATERAL LOW BACK PAIN WITH BILATERAL SCIATICA: ICD-10-CM

## 2018-10-30 DIAGNOSIS — Z79.4 TYPE 2 DIABETES MELLITUS WITH HYPERGLYCEMIA, WITH LONG-TERM CURRENT USE OF INSULIN (HCC): Primary | ICD-10-CM

## 2018-10-30 DIAGNOSIS — G89.29 CHRONIC BILATERAL LOW BACK PAIN WITH BILATERAL SCIATICA: ICD-10-CM

## 2018-10-30 DIAGNOSIS — E11.65 TYPE 2 DIABETES MELLITUS WITH HYPERGLYCEMIA, WITH LONG-TERM CURRENT USE OF INSULIN (HCC): Primary | ICD-10-CM

## 2018-10-30 DIAGNOSIS — M25.50 ARTHRALGIA, UNSPECIFIED JOINT: ICD-10-CM

## 2018-10-30 DIAGNOSIS — F17.200 SMOKING: ICD-10-CM

## 2018-10-30 DIAGNOSIS — Z23 ENCOUNTER FOR IMMUNIZATION: ICD-10-CM

## 2018-10-30 DIAGNOSIS — R26.2 DIFFICULTY WALKING: ICD-10-CM

## 2018-10-30 DIAGNOSIS — G47.30 SLEEP APNEA, UNSPECIFIED TYPE: ICD-10-CM

## 2018-10-30 DIAGNOSIS — F32.A DEPRESSION, UNSPECIFIED DEPRESSION TYPE: ICD-10-CM

## 2018-10-30 LAB
GLUCOSE POC: 338 MG/DL
HBA1C MFR BLD HPLC: 10.5 %

## 2018-10-30 RX ORDER — ALBUTEROL SULFATE 90 UG/1
1 AEROSOL, METERED RESPIRATORY (INHALATION)
Qty: 1 INHALER | Refills: 1 | Status: SHIPPED | OUTPATIENT
Start: 2018-10-30 | End: 2019-07-08 | Stop reason: SDUPTHER

## 2018-10-30 RX ORDER — PRAVASTATIN SODIUM 20 MG/1
20 TABLET ORAL
COMMUNITY
End: 2018-11-06 | Stop reason: SDUPTHER

## 2018-10-30 RX ORDER — BUPROPION HYDROCHLORIDE 150 MG/1
TABLET, EXTENDED RELEASE ORAL 2 TIMES DAILY
COMMUNITY
End: 2018-11-06 | Stop reason: SDUPTHER

## 2018-10-30 RX ORDER — CYCLOBENZAPRINE HCL 10 MG
TABLET ORAL
COMMUNITY
End: 2019-01-31 | Stop reason: SDUPTHER

## 2018-10-30 RX ORDER — METFORMIN HYDROCHLORIDE 1000 MG/1
1000 TABLET ORAL 2 TIMES DAILY WITH MEALS
COMMUNITY
End: 2018-11-06 | Stop reason: SDUPTHER

## 2018-10-30 RX ORDER — GABAPENTIN 100 MG/1
CAPSULE ORAL 3 TIMES DAILY
COMMUNITY
End: 2018-11-06 | Stop reason: SDUPTHER

## 2018-10-30 RX ORDER — CHOLECALCIFEROL TAB 125 MCG (5000 UNIT) 125 MCG
TAB ORAL DAILY
COMMUNITY
End: 2018-11-06 | Stop reason: SDUPTHER

## 2018-10-30 RX ORDER — LISINOPRIL 5 MG/1
TABLET ORAL DAILY
COMMUNITY
End: 2018-11-06 | Stop reason: SDUPTHER

## 2018-10-30 NOTE — LETTER
11/2/2018 9:28 AM 
 
Ms. Isatu Dia 
[address]  [address] [facility] [id number] Dear Devi Manriquez: 
 
Your diabetes numbers are very high, please schedule an appointment with Dr. Mat Valenzuela, then diabetes specialist. Work on reducing sugar in your tea, breads and sweet snacks. Repeat your potassium level as we discussed. Your cholesterol values are okay, continue your pravastatin for this. Your labs for rheumatoid arthritis are normal, please follow up with [facility], for physical therapy as we talked about. Resulted Orders AMB POC HEMOGLOBIN A1C Result Value Ref Range Hemoglobin A1c (POC) 10.5 % AMB POC GLUCOSE BLOOD, BY GLUCOSE MONITORING DEVICE Result Value Ref Range Glucose  mg/dL METABOLIC PANEL, COMPREHENSIVE Result Value Ref Range Glucose 313 (H) 65 - 99 mg/dL BUN 13 8 - 27 mg/dL Creatinine 0.80 0.57 - 1.00 mg/dL GFR est non-AA 79 >59 mL/min/1.73 GFR est AA 91 >59 mL/min/1.73  
 BUN/Creatinine ratio 16 12 - 28 Sodium 138 134 - 144 mmol/L Potassium 5.4 (H) 3.5 - 5.2 mmol/L Chloride 98 96 - 106 mmol/L  
 CO2 26 20 - 29 mmol/L Calcium 9.1 8.7 - 10.3 mg/dL Protein, total 7.2 6.0 - 8.5 g/dL Albumin 4.1 3.6 - 4.8 g/dL GLOBULIN, TOTAL 3.1 1.5 - 4.5 g/dL A-G Ratio 1.3 1.2 - 2.2 Bilirubin, total 0.4 0.0 - 1.2 mg/dL Alk. phosphatase 83 39 - 117 IU/L  
 AST (SGOT) 17 0 - 40 IU/L  
 ALT (SGPT) 15 0 - 32 IU/L Narrative Performed at:  [address]  [id number] : Cristino Craft MD, Phone:  [phone] CBC W/O DIFF Result Value Ref Range WBC 8.1 3.4 - 10.8 x10E3/uL  
 RBC 4.84 3.77 - 5.28 x10E6/uL HGB 14.5 11.1 - 15.9 g/dL HCT 43.6 34.0 - 46.6 % MCV 90 79 - 97 fL  
 MCH 30.0 26.6 - 33.0 pg  
 MCHC 33.3 31.5 - 35.7 g/dL  
 RDW 13.2 12.3 - 15.4 % PLATELET 699 605 - 088 x10E3/uL Narrative Performed at:  [place] [address] [address]  [id number] : Dwight Ballard MD, Phone:  [phone] LIPID PANEL Result Value Ref Range Cholesterol, total 181 100 - 199 mg/dL Triglyceride 171 (H) 0 - 149 mg/dL HDL Cholesterol 43 >39 mg/dL VLDL, calculated 34 5 - 40 mg/dL LDL, calculated 104 (H) 0 - 99 mg/dL Narrative Performed at:  [address]  [id number] : Dwight Ballard MD, Phone:  [phone] TSH 3RD GENERATION Result Value Ref Range TSH 0.599 0.450 - 4.500 uIU/mL Narrative Performed at:  [address]  [id number] : Dwight Ballard MD, Phone:  [phone] CYCLIC CITRUL PEPTIDE AB, IGG Result Value Ref Range CCP Antibodies IgG/IgA 7 0 - 19 units Comment:  
                             Negative               <20 Weak positive      20 - 39 Moderate positive  40 - 59 Strong positive        >59 Narrative Performed at:  [address]  [id number] : Dwight Ballard MD, Phone:  [phone] RHEUMATOID FACTOR, QL Result Value Ref Range Rheumatoid factor 11.9 0.0 - 13.9 IU/mL Narrative Performed at:  [address]  [id number] : Dwight Ballard MD, Phone:  [phone] VITAMIN D, 25 HYDROXY Result Value Ref Range VITAMIN D, 25-HYDROXY 32.0 30.0 - 100.0 ng/mL Comment:  
   Vitamin D deficiency has been defined by the [facility] practice guideline as a 
level of serum 25-OH vitamin D less than 20 ng/mL (1,2). The Endocrine Society went on to further define vitamin D 
insufficiency as a level between 21 and 29 ng/mL (2). 1. IOM ([place] of Medicine). 2010. Dietary reference intakes for calcium and D. [address] [place] [address]: The 
   [employer]. 2. Bernadette MF, Serene NC, Lashawn JENSEN, et al. 
   Evaluation, treatment, and prevention of vitamin D 
   deficiency: an Endocrine Society clinical practice 
   guideline. JCEM. 2011 Jul; 96(7):1911-30. Narrative Performed at:  [address]  [id number] : Julieta Hernandez MD, Phone:  [phone] CVD REPORT Result Value Ref Range INTERPRETATION Note Comment:  
   Supplemental report is available. Narrative Performed at:  [address] [place] [address]  [id number] : Edwin Haile MD, Phone:  [phone] DIABETES PATIENT EDUCATION Result Value Ref Range PDF Image Not applicable Narrative Performed at:  [address] [place] [address]  [id number] : Edwin Haile MD, Phone:  [phone] RECOMMENDATIONS: 
Work on diet and exercise. Please call me if you have any questions: [phone] Sincerely, Sejal Yeboah NP

## 2018-10-30 NOTE — PROGRESS NOTES
Pt here for Chief Complaint Patient presents with [facility]  Diabetes  Cholesterol Problem 1. Have you been to the ER, urgent care clinic since your last visit? Hospitalized since your last visit? Yes When: Weakness SOB they are not sure which ER not [facility] 2. Have you seen or consulted any other health care providers outside of the [address] [place] [address] since your last visit? Include any pap smears or colon screening. No  
 
 
Pt c/o pain 10 of 10, Pt denies taking anything for pain today PHQ over the last two weeks 10/30/2018 Little interest or pleasure in doing things Nearly every day Feeling down, depressed, irritable, or hopeless Nearly every day Total Score PHQ 2 6 Madison Bautista is a 61 y.o. female who presents for routine immunizations. She denies any symptoms , reactions or allergies that would exclude them from being immunized today. Risks and adverse reactions were discussed and the VIS was given to them. All questions were addressed. She was observed for 15 min post injection. There were no reactions observed.  
 
James Alford LPN

## 2018-10-30 NOTE — TELEPHONE ENCOUNTER
10-30-18 @ 10:00 am Yumiko Bee returned writer call  Regarding Pt she states that their will be a  from [employer] who will arrive at 12:30  And was alloted 2 hours for the visit. Zunilda Fox stated that the patient's care giver is Annette Crowe and the Son In-Law also speaks [place] but is at work.  She states that the patient is very sick and has problems with controlling her Blood Sugar, Yumiko Bee the Case Manger for [facility] would like a follow-up call regarding this patient office visit  @ [phone] ext [address] [place] [address]

## 2018-10-30 NOTE — PROGRESS NOTES
Subjective: (As above and below) Chief Complaint Patient presents with [facility]  Diabetes  Cholesterol Problem oJhn Gomez is a 61y.o. year old female who presents to [facility]. She speaks [place] is from [facility] and is accompanied by: her daughter And , Colettea Ganser,  from [employer] (waiver signed). She has multiple requests. She is established within [facility], wishes to change PCP because she felt like she was \"not getting better\". She lives with her son and now her daughter & son-in-law who recently moved from [place]. Diabetes: reports taking novlog 70/30 30 units in the morning and 40 units in the evening. She claims to be adherent to meds. She denies eating bread because she has bad dentition, she eats rice, she drinks her tea w/ regular sugar, occasional snacks on dates and fruits. Denies other sweets. ... She has a hx of very poorly controlled DM, per chart review, she has seen endo 6 mo ago but did not follow up. She checks sugars at home and administers insulin - values have been in the 200-400's. she did not bring insulin to visit or meter Pain: she reports \"pain all over\". She specifically has pain to lower back and bilateral knees L>R. She has aching to elbows, neck and presents in a wheelchair. Per daughter she can't get up without a lot of assistance due to pain. No recent falls. She asks for an electric wheelchair and shower chair. She has seen ortho, Dr. Emma Hadley and states that an MRI was done which was normal.... She states that she has not had physical therapy or any cortisone injections. Daughter believes she has \"rheumatisim\". She denies bowel/bladder dysfunction (but uses depends at night bc she can't get up in time to void), she reports leg weakness and pain radiating down both legs. She has flexeril and gabapentin in her bag that don't help per patient.  It isn't very clear why she can't walk but family says she has been immobile like this for approx 5 years. She doesn't take motrin or tylenol - unclear why other than \"it wasn't given to her\" Depression: on Wellbutrin - states this was for depression not smoking cessation, states it doesn't work. She wishes to see psychiatry, see that she was previously rf'd but did not go. Family is also working on getting her seen by [facility]. She denies SI Sleep apnea: no cpap, did not follow up w/ sleep studies Smoking: verified that she smokes 2 packs of cigarettes per day. .. Sometimes more per daughter. She smells heavily of smoke. She states that she does not want to quit. She reports occasional wheezing. Unknown if JAUREGUI bc she doesn't move. She has a nebulizer but doesn't know how to use it. Has never seen pulm. She is overdue for mammogram, never had colonoscopy, dentist, eye doctor. Reviewed PmHx, RxHx, FmHx, SocHx, AllgHx and updated in chart. Family History Problem Relation Age of Onset  Diabetes Mother  Diabetes Father  Diabetes Sister  Diabetes Brother Past Medical History:  
Diagnosis Date  Diabetes ([facility] [address]  Hypercholesterolemia Social History Socioeconomic History  Marital status: UNKNOWN Spouse name: Not on file  Number of children: Not on file  Years of education: Not on file  Highest education level: Not on file Social Needs  Financial resource strain: Not on file  Food insecurity - worry: Not on file  Food insecurity - inability: Not on file  Transportation needs - medical: Not on file  Transportation needs - non-medical: Not on file Occupational History  Not on file Tobacco Use  Smoking status: Current Every Day Smoker Packs/day: 2.00 Years: 41.00 Pack years: 82.00 Types: Cigarettes  Smokeless tobacco: Never Used Substance and Sexual Activity  Alcohol use: No  
  Frequency: Never Binge frequency: Never  Drug use:  No  
  Sexual activity: No  
Other Topics Concern  Not on file Social History Narrative  Not on file Current Outpatient Medications Medication Sig  
 lisinopril (PRINIVIL, ZESTRIL) 5 mg tablet Take  by mouth daily.  gabapentin (NEURONTIN) 100 mg capsule Take  by mouth three (3) times daily.  metFORMIN (GLUCOPHAGE) 1,000 mg tablet Take 1,000 mg by mouth two (2) times daily (with meals).  cyclobenzaprine (FLEXERIL) 10 mg tablet Take  by mouth three (3) times daily as needed for Muscle Spasm(s).  pravastatin (PRAVACHOL) 20 mg tablet Take 20 mg by mouth nightly.  buPROPion SR (WELLBUTRIN SR) 150 mg SR tablet Take  by mouth two (2) times a day.  cholecalciferol, VITAMIN D3, (VITAMIN D3) 5,000 unit tab tablet Take  by mouth daily.  insulin NPH/insulin regular (HUMULIN 70/30 U-100 INSULIN) 100 unit/mL (70-30) injection by SubCUTAneous route.  albuterol (PROVENTIL HFA, VENTOLIN HFA, PROAIR HFA) 90 mcg/actuation inhaler Take 1 Puff by inhalation every six (6) hours as needed for Wheezing. No current facility-administered medications for this visit. Review of Systems:  
Constitutional:    Negative for fever and chills, negative diaphoresis. HEENT:              Negative for neck pain and stiffness. Eyes:                  Negative for visual disturbance, itching, redness or discharge. Respiratory:        Negative for cough and + shortness of breath. Cardiovascular:  Negative for chest pain and palpitations. +chest tighnes Gastrointestinal: Negative for nausea, vomiting, abdominal pain, diarrhea +occasional constipation. Genitourinary:     Negative for dysuria and frequency. Musculoskeletal: pain all over Skin:                    Negative for rash, masses or lesions. Neurological:       Negative for dizzyness, seizure, loss of consciousness, weakness +pain radiating down legs Objective:  
 
Vitals:  
 10/30/18 1257 BP: 121/70 Pulse: 89 Resp: 18  
 Temp: 96.5 °F (35.8 °C) TempSrc: Oral  
SpO2: 95% Weight: 204 lb 1.6 oz (92.6 kg) Height: 5' 4\" (1.626 m) Results for orders placed or performed in visit on 10/30/18 AMB POC HEMOGLOBIN A1C Result Value Ref Range Hemoglobin A1c (POC) 10.5 % AMB POC GLUCOSE BLOOD, BY GLUCOSE MONITORING DEVICE Result Value Ref Range Glucose  mg/dL Gen: Oriented to person, place and time and well-developed, well-nourished and in no distress. HEENT:  Poor dentition Head: normocephalic and atraumatic. Eyes:  EOM are normal. Pupils equal and round. Neck:  Normal range of motion. Neck supple. Cardiovascular: normal rate, regular rhythm and normal heart sounds. Pulmonary/Chest:  Effort normal and breath sounds normal.  No respiratory distress. No wheezes, no rales. Musculoskeletal:  Pt stated she could not get out of chair for full exam, no lower ext edema, no knee swelling Neurological:  Alert, oriented to person, place and time. Skin: skin is warm and dry. Assessment/ Plan:  
Follow-up Disposition: 
Return in about 3 months (around 1/30/2019). 1. Type 2 diabetes mellitus with hyperglycemia, with long-term current use of insulin ([address] Re-est w/ endo, hopefully now that he daughter has moved her she will encourage better adherence - AMB POC HEMOGLOBIN A1C 
- AMB POC GLUCOSE BLOOD, BY GLUCOSE MONITORING DEVICE 
- METABOLIC PANEL, COMPREHENSIVE 
- CBC W/O DIFF 
- LIPID PANEL 
- REFERRAL TO OPHTHALMOLOGY 
- REFERRAL TO ENDOCRINOLOGY 2. Non-English speaking patient 3. Screening mammogram, encounter for - [facility] MAMMO BI SCREENING INCL CAD; Future 4. Hyperlipidemia, unspecified hyperlipidemia type - LIPID PANEL 5. Arthralgia, unspecified joint 
?fibromyalgia 
- TSH 3RD GENERATION 
- VITAMIN D, 25 HYDROXY; Future 
- RHEUMATOID FACTOR, QT 
- CYCLIC CITRUL PEPTIDE AB, IGG 
- REFERRAL TO PHYSICAL THERAPY 6. Chronic bilateral low back pain with bilateral sciatica [facility] for PT and determine need for wheelchair, will get recs from ortho, pt deconditioned? 
- REFERRAL TO PHYSICAL THERAPY 7. Encounter for immunization 
 
- INFLUENZA VIRUS VAC QUAD,SPLIT,PRESV FREE SYRINGE IM 
 
8. Encounter for screening colonoscopy 
 
- REFERRAL TO GASTROENTEROLOGY 9. Sleep apnea, unspecified type 
 
- REFERRAL TO SLEEP STUDIES 10. Depression, unspecified depression type 
 
- REFERRAL TO PSYCHIATRY 11. Smoking 2+PPD x 40+ years 
- REFERRAL TO PULMONARY DISEASE 12. JAUREGUI (dyspnea on exertion) Inhaler written 
- REFERRAL TO PULMONARY DISEASE 13. Difficulty walking [id number] [facility] chair - AMB SUPPLY ORDER I have discussed the diagnosis with the patient and the intended plan as seen in the above orders. The patient has received an after-visit summary and questions were answered concerning future plans. Pt conveyed understanding of plan. Medication Side Effects and Warnings were discussed with patient: yes Patient Labs were reviewed: yes Patient Past Records were reviewed:  yes Sejal Spann, NP

## 2018-10-30 NOTE — PATIENT INSTRUCTIONS
. 
Nutrition Tips for Diabetes: After Your Visit Your Care Instructions A healthy diet is important to manage diabetes. It helps you lose weight (if you need to) and keep it off. It gives you the nutrition and energy your body needs and helps prevent heart disease. But a diet for diabetes does not mean that you have to eat special foods. You can eat what your family eats, including occasional sweets and other favorites. But you do have to pay attention to how often you eat and how much you eat of certain foods. The right plan for you will give you meals that help you keep your blood sugar at healthy levels. Try to eat a variety of foods and to spread carbohydrate throughout the day. Carbohydrate raises blood sugar higher and more quickly than any other nutrient does. Carbohydrate is found in sugar, breads and cereals, fruit, starchy vegetables such as potatoes and corn, and milk and yogurt. You may want to work with a dietitian or diabetes educator to help you plan meals and snacks. A dietitian or diabetes educator also can help you lose weight if that is one of your goals. The following tips can help you enjoy your meals and stay healthy. Follow-up care is a key part of your treatment and safety. Be sure to make and go to all appointments, and call your doctor if you are having problems. Its also a good idea to know your test results and keep a list of the medicines you take. How can you care for yourself at home? · Learn which foods have carbohydrate and how much carbohydrate to eat. A dietitian or diabetes educator can help you learn to keep track of how much carbohydrate you eat. · Spread carbohydrate throughout the day. Eat some carbohydrate at all meals, but do not eat too much at any one time. · Plan meals to include food from all the food groups. These are the food groups and some example portion sizes: ¨ Grains: 1 slice of bread (1 ounce), ½ cup of cooked cereal, and 1/3 cup of cooked pasta or rice. These have about 15 grams of carbohydrate in a serving. Choose whole grains such as whole wheat bread or crackers, oatmeal, and brown rice more often than refined grains. ¨ Fruit: 1 small fresh fruit, such as an apple or orange; ½ of a banana; ½ cup of chopped, cooked, or canned fruit; ½ cup of fruit juice; 1 cup of melon or raspberries; and 2 tablespoons of dried fruit. These have about 15 grams of carbohydrate in a serving. ¨ Dairy: 1 cup of nonfat or low-fat milk and 2/3 cup of plain yogurt. These have about 15 grams of carbohydrate in a serving. ¨ Protein foods: Beef, chicken, turkey, fish, eggs, tofu, cheese, cottage cheese, and peanut butter. A serving size of meat is 3 ounces, which is about the size of a deck of cards. Examples of meat substitute serving sizes (equal to 1 ounce of meat) are 1/4 cup of cottage cheese, 1 egg, 1 tablespoon of peanut butter, and ½ cup of tofu. These have very little or no carbohydrate per serving. ¨ Vegetables: Starchy vegetables such as ½ cup of cooked dried beans, peas, potatoes, or corn have about 15 grams of carbohydrate. Nonstarchy vegetables have very little carbohydrate, such as 1 cup of raw leafy vegetables (such as spinach), ½ cup of other vegetables (cooked or chopped), and 3/4 cup of vegetable juice. · Use the plate format to plan meals. It is a good, quick way to make sure that you have a balanced meal. It also helps you spread carbohydrate throughout the day. You divide your plate by types of foods. Put vegetables on half the plate, meat or meat substitutes on one-quarter of the plate, and a grain or starchy vegetable (such as brown rice or a potato) in the final quarter of the plate.  To this you can add a small piece of fruit and 1 cup of milk or yogurt, depending on how much carbohydrate you are supposed to eat at a meal. 
· Talk to your dietitian or diabetes educator about ways to add limited amounts of sweets into your meal plan. You can eat these foods now and then, as long as you include the amount of carbohydrate they have in your daily carbohydrate allowance. · If you drink alcohol, limit it to no more than 1 drink a day for women and 2 drinks a day for men. If you are pregnant, no amount of alcohol is known to be safe. · Protein, fat, and fiber do not raise blood sugar as much as carbohydrate does. If you eat a lot of these nutrients in a meal, your blood sugar will rise more slowly than it would otherwise. · Limit saturated fats, such as those from meat and dairy products. Try to replace it with monounsaturated fat, such as olive oil. This is a healthier choice because people who have diabetes are at higher-than-average risk of heart disease. But use a modest amount of olive oil. A tablespoon of olive oil has 14 grams of fat and 120 calories. · Exercise lowers blood sugar. If you take insulin by shots or pump, you can use less than you would if you were not exercising. Keep in mind that timing matters. If you exercise within 1 hour after a meal, your body may need less insulin for that meal than it would if you exercised 3 hours after the meal. Test your blood sugar to find out how exercise affects your need for insulin. · Exercise on most days of the week. Aim for at least 30 minutes. Exercise helps you stay at a healthy weight and helps your body use insulin. Walking is an easy way to get exercise. Gradually increase the amount you walk every day. You also may want to swim, bike, or do other activities. When you eat out · Learn to estimate the serving sizes of foods that have carbohydrate. If you measure food at home, it will be easier to estimate the amount in a serving of restaurant food. · If the meal you order has too much carbohydrate (such as potatoes, corn, or baked beans), ask to have a low-carbohydrate food instead. Ask for a salad or green vegetables. · If you use insulin, check your blood sugar before and after eating out to help you plan how much to eat in the future. · If you eat more carbohydrate at a meal than you had planned, take a walk or do other exercise. This will help lower your blood sugar. Where can you learn more? Go to [employer] Enter V376 in the search box to learn more about \"Nutrition Tips for Diabetes: After Your Visit. \"  
© 3761-5789 [employer]wise, [employer]orporated. Care instructions adapted under license by Joseluis Valentine (which disclaims liability or warranty for this information). This care instruction is for use with your licensed healthcare professional. If you have questions about a medical condition or this instruction, always ask your healthcare professional. [address] any warranty or liability for your use of this information. Content Version: 42.0.837210; Current as of: June 4, 2014

## 2018-11-01 LAB
25(OH)D3+25(OH)D2 SERPL-MCNC: 32 NG/ML (ref 30–100)
ALBUMIN SERPL-MCNC: 4.1 G/DL (ref 3.6–4.8)
ALBUMIN/GLOB SERPL: 1.3 {RATIO} (ref 1.2–2.2)
ALP SERPL-CCNC: 83 IU/L (ref 39–117)
ALT SERPL-CCNC: 15 IU/L (ref 0–32)
AST SERPL-CCNC: 17 IU/L (ref 0–40)
BILIRUB SERPL-MCNC: 0.4 MG/DL (ref 0–1.2)
BUN SERPL-MCNC: 13 MG/DL (ref 8–27)
BUN/CREAT SERPL: 16 (ref 12–28)
CALCIUM SERPL-MCNC: 9.1 MG/DL (ref 8.7–10.3)
CCP IGA+IGG SERPL IA-ACNC: 7 UNITS (ref 0–19)
CHLORIDE SERPL-SCNC: 98 MMOL/L (ref 96–106)
CHOLEST SERPL-MCNC: 181 MG/DL (ref 100–199)
CO2 SERPL-SCNC: 26 MMOL/L (ref 20–29)
CREAT SERPL-MCNC: 0.8 MG/DL (ref 0.57–1)
ERYTHROCYTE [DISTWIDTH] IN BLOOD BY AUTOMATED COUNT: 13.2 % (ref 12.3–15.4)
GLOBULIN SER CALC-MCNC: 3.1 G/DL (ref 1.5–4.5)
GLUCOSE SERPL-MCNC: 313 MG/DL (ref 65–99)
HCT VFR BLD AUTO: 43.6 % (ref 34–46.6)
HDLC SERPL-MCNC: 43 MG/DL
HGB BLD-MCNC: 14.5 G/DL (ref 11.1–15.9)
INTERPRETATION, 910389: NORMAL
LDLC SERPL CALC-MCNC: 104 MG/DL (ref 0–99)
Lab: NORMAL
MCH RBC QN AUTO: 30 PG (ref 26.6–33)
MCHC RBC AUTO-ENTMCNC: 33.3 G/DL (ref 31.5–35.7)
MCV RBC AUTO: 90 FL (ref 79–97)
PLATELET # BLD AUTO: 332 X10E3/UL (ref 150–379)
POTASSIUM SERPL-SCNC: 5.4 MMOL/L (ref 3.5–5.2)
PROT SERPL-MCNC: 7.2 G/DL (ref 6–8.5)
RBC # BLD AUTO: 4.84 X10E6/UL (ref 3.77–5.28)
RHEUMATOID FACT SERPL-ACNC: 11.9 IU/ML (ref 0–13.9)
SODIUM SERPL-SCNC: 138 MMOL/L (ref 134–144)
TRIGL SERPL-MCNC: 171 MG/DL (ref 0–149)
TSH SERPL DL<=0.005 MIU/L-ACNC: 0.6 UIU/ML (ref 0.45–4.5)
VLDLC SERPL CALC-MCNC: 34 MG/DL (ref 5–40)
WBC # BLD AUTO: 8.1 X10E3/UL (ref 3.4–10.8)

## 2018-11-02 ENCOUNTER — TELEPHONE (OUTPATIENT)
Dept: INTERNAL MEDICINE CLINIC | Age: 63
End: 2018-11-02

## 2018-11-02 DIAGNOSIS — R26.89 DECREASED MOBILITY: Primary | ICD-10-CM

## 2018-11-02 DIAGNOSIS — E87.5 HIGH POTASSIUM: ICD-10-CM

## 2018-11-02 NOTE — TELEPHONE ENCOUNTER
Spoke w/ Sanket who's name is really [facility] BLAYNE  Reviewed labs - they will bring her to repeat K level on Monday

## 2018-11-02 NOTE — LETTER
11/6/2018 11:39 AM 
 
[facility]. [facility] 
[address]  [address] [facility] [id number] Dear Aleksander Hay: 
 
Your blood sugars are very high. Take your insulin twice daily as directed. Try to reduce sugar in your tea and sweet snacks. You need to follow up with the diabetes specialist. Please call him to schedule as soon as possible. Resulted Orders METABOLIC PANEL, BASIC Result Value Ref Range Glucose 411 (H) 65 - 99 mg/dL BUN 14 8 - 27 mg/dL Creatinine 0.92 0.57 - 1.00 mg/dL GFR est non-AA 66 >59 mL/min/1.73 GFR est AA 77 >59 mL/min/1.73  
 BUN/Creatinine ratio 15 12 - 28 Sodium 136 134 - 144 mmol/L Potassium 5.1 3.5 - 5.2 mmol/L Chloride 95 (L) 96 - 106 mmol/L  
 CO2 27 20 - 29 mmol/L Calcium 9.7 8.7 - 10.3 mg/dL Narrative Performed at:  [address]  [id number] : Ty Valentin MD, Phone:  [phone] RECOMMENDATIONS: 
 
 
Please call me if you have any questions: [phone] Sincerely, Sejal Carlson NP

## 2018-11-02 NOTE — TELEPHONE ENCOUNTER
Called and spoke w/ Joanne at [address] [place] [address]. Called to have pt scheduled for wheelchair evaluation. Claudette Sg stated that due to her having a line of pt's at her  that she will call our office back to get needed pt info to schedule appt. For pt.

## 2018-11-02 NOTE — TELEPHONE ENCOUNTER
ΣΑΡΑΝΤΙ from [facility] called because she doesn't have an order for what the N.P would want them to do. She needs more clarification or information. She would like to know is it to be PT/OT, Skilled nursing? The contact number is [phone].

## 2018-11-06 DIAGNOSIS — F33.9 MAJOR DEPRESSIVE DISORDER, RECURRENT EPISODE WITH ANXIOUS DISTRESS (HCC): ICD-10-CM

## 2018-11-06 LAB
BUN SERPL-MCNC: 14 MG/DL (ref 8–27)
BUN/CREAT SERPL: 15 (ref 12–28)
CALCIUM SERPL-MCNC: 9.7 MG/DL (ref 8.7–10.3)
CHLORIDE SERPL-SCNC: 95 MMOL/L (ref 96–106)
CO2 SERPL-SCNC: 27 MMOL/L (ref 20–29)
CREAT SERPL-MCNC: 0.92 MG/DL (ref 0.57–1)
GLUCOSE SERPL-MCNC: 411 MG/DL (ref 65–99)
POTASSIUM SERPL-SCNC: 5.1 MMOL/L (ref 3.5–5.2)
SODIUM SERPL-SCNC: 136 MMOL/L (ref 134–144)

## 2018-11-06 RX ORDER — BUPROPION HYDROCHLORIDE 300 MG/1
300 TABLET ORAL
Qty: 90 TAB | Refills: 0 | Status: SHIPPED | OUTPATIENT
Start: 2018-11-06 | End: 2019-04-08

## 2018-11-06 RX ORDER — CHOLECALCIFEROL TAB 125 MCG (5000 UNIT) 125 MCG
5000 TAB ORAL DAILY
Qty: 90 TAB | Refills: 0 | Status: SHIPPED | OUTPATIENT
Start: 2018-11-06 | End: 2019-07-08 | Stop reason: SDUPTHER

## 2018-11-06 RX ORDER — METFORMIN HYDROCHLORIDE 1000 MG/1
1000 TABLET ORAL 2 TIMES DAILY WITH MEALS
Qty: 180 TAB | Refills: 0 | Status: SHIPPED | OUTPATIENT
Start: 2018-11-06 | End: 2019-07-08 | Stop reason: SDUPTHER

## 2018-11-06 RX ORDER — GABAPENTIN 100 MG/1
100 CAPSULE ORAL 3 TIMES DAILY
Qty: 270 CAP | Refills: 0 | Status: SHIPPED | OUTPATIENT
Start: 2018-11-06 | End: 2019-01-31

## 2018-11-06 RX ORDER — LISINOPRIL 5 MG/1
5 TABLET ORAL DAILY
Qty: 90 TAB | Refills: 0 | Status: SHIPPED | OUTPATIENT
Start: 2018-11-06 | End: 2019-07-08 | Stop reason: SDUPTHER

## 2018-11-06 RX ORDER — PRAVASTATIN SODIUM 20 MG/1
20 TABLET ORAL
Qty: 90 TAB | Refills: 0 | Status: SHIPPED | OUTPATIENT
Start: 2018-11-06 | End: 2019-07-08 | Stop reason: SDUPTHER

## 2018-11-06 RX ORDER — INSULIN ASPART 100 [IU]/ML
INJECTION, SUSPENSION SUBCUTANEOUS
Qty: 10 PEN | Refills: 6 | Status: SHIPPED | OUTPATIENT
Start: 2018-11-06 | End: 2019-01-21 | Stop reason: SDUPTHER

## 2018-12-21 ENCOUNTER — TELEPHONE (OUTPATIENT)
Dept: INTERNAL MEDICINE CLINIC | Age: 63
End: 2018-12-21

## 2018-12-21 NOTE — TELEPHONE ENCOUNTER
Received order from [address] [place] [address] rehab where pt was sent for wheelchair eval at request of patient and family  Left message w/ PT Marily Sahu (PT that worked w/ her) in regards to this, plan and need for chair. Darrell Grier

## 2019-01-21 ENCOUNTER — OFFICE VISIT (OUTPATIENT)
Dept: ENDOCRINOLOGY | Age: 64
End: 2019-01-21

## 2019-01-21 VITALS
HEIGHT: 64 IN | BODY MASS INDEX: 35.03 KG/M2 | DIASTOLIC BLOOD PRESSURE: 68 MMHG | SYSTOLIC BLOOD PRESSURE: 108 MMHG | HEART RATE: 84 BPM

## 2019-01-21 RX ORDER — INSULIN ASPART 100 [IU]/ML
INJECTION, SUSPENSION SUBCUTANEOUS
Qty: 10 PEN | Refills: 6
Start: 2019-01-21 | End: 2019-07-08 | Stop reason: SDUPTHER

## 2019-01-21 NOTE — PROGRESS NOTES
This is a 29-year-old [place] female who we have seen once before in January 2018. At that time she was taking Humalog insulin plus NovoLog 70/30 insulin. We eliminated the Humalog and encouraged her to take 20 units of 70/30 insulin in the morning and 40 units in the evening based on the fact that she told us via  that her largest meal was the evening meal.      This entire visit was via an  as well as some conversation with the son-in-law but decisions were made based on the interaction between the patient and the . She presents today with her son-in-law who is  to another of the daughters. Since we saw her last, she tells us that she has been taking 30 units of 70/30 insulin in the morning and 20 units of 70/30 units in the evening. The diet has been difficult to describe by the patient but it appears that she is eating too large flat breads made with rice flower both in the morning and in the evening. What complicates the matter is that she may eat these meals at any time of the day or night. She has 1 son who lives in [place] ([facility]) and is unable to come to the [place] and so when she talks on the phone to him, she may eat a meal as late as 3:00 in the morning to coincide with his day. According to the son-in-law who was with the patient today, she eats very sporadically. However on average she eats one meal somewhere between 10 AM and 1 PM and another meal somewhere between 9 PM and midnight. Again both meals are usually the flat bread usually with potatoes or other vegetables. She does not check blood sugars. And it is not entirely clear that she takes her insulin twice daily as recommended. She lives alone and so there is no one to verify whether she does or does not take the insulin. She says if she feels bad she might check a blood sugar and they can be in the 400 range but apparently this occurs very infrequently according to the son-in-law.   Patient insists that she knows when her blood sugar is high or when her blood sugar is low. Unfortunately her hemoglobin A1c's are quite elevated. Her most recent A1c was in 2018 and that was 10.1%. Examination  Blood pressure 108/68  Pulse 84    Impression type 2 diabetes mellitus with poor blood sugar control. We remain very concerned that the patient is not taking her insulin on a consistent basis. In any case we negotiated the followin. She is agreed to take 40 units of insulin before the first meal of the day at 1:00 and 40 units of insulin before the second meal the day at 9 PM  2. She has agreed to eat to flat breads with each of those 2 meals  3. She is agreed to check blood sugars  4. She will come back in 1 month with her son-in-law and we will review those blood sugars. Please note the son-in-law seems quite dedicated to caring for his mother-in-law and so perhaps we can get her blood sugars better than they currently are. We spent more than 25 mintutes face to face, more than 50% was in counseling regarding diet, exercise and carbohydrate intake.

## 2019-01-21 NOTE — PATIENT INSTRUCTIONS
Please take 40 units of the 70/30 insulin with the first meal of the day (11 am), and take 40 units of insulin with the meal at 9pm in the evening. Check blood sugar at the time of both injections. íÑÌì ÃÎÐ 40 æÍÏÉ ãä ÇáÃäÓæáíä 70/30 ãÚ ÇáæÌÈÉ ÇáÃæáì ãä Çáíæã (11 ÕÈÇÍÇ) ¡ æÇÊÎÇÐ 40 æÍÏÉ ãä ÇáÃäÓæáíä ãÚ ÇáæÌÈÉ Ýí ÇáÓÇÚÉ 9 ãÓÇÁ. ÝÍÕ äÓÈÉ ÇáÓßÑ Ýí ÇáÏã Ýí æÞÊ ÇáÍÞä.    yrja 'akhadh 40 wahdatan min al'unsulin 70/30 farfan alwajbat al'uwlaa min alyawm (11 sabaha) , waitikhadh 40 wahdatan min al'unsulin farfan alwajbat fi alssaeat 9 masa'in

## 2019-01-28 ENCOUNTER — ANESTHESIA (OUTPATIENT)
Dept: ENDOSCOPY | Age: 64
End: 2019-01-28
Payer: MEDICAID

## 2019-01-28 ENCOUNTER — ANESTHESIA EVENT (OUTPATIENT)
Dept: ENDOSCOPY | Age: 64
End: 2019-01-28
Payer: MEDICAID

## 2019-01-28 ENCOUNTER — HOSPITAL ENCOUNTER (OUTPATIENT)
Age: 64
Setting detail: OUTPATIENT SURGERY
Discharge: HOME | End: 2019-01-28
Attending: INTERNAL MEDICINE | Admitting: INTERNAL MEDICINE
Payer: MEDICAID

## 2019-01-28 VITALS
HEIGHT: 64 IN | HEART RATE: 80 BPM | RESPIRATION RATE: 11 BRPM | BODY MASS INDEX: 34.83 KG/M2 | OXYGEN SATURATION: 99 % | WEIGHT: 204 LBS | TEMPERATURE: 97.9 F | DIASTOLIC BLOOD PRESSURE: 55 MMHG | SYSTOLIC BLOOD PRESSURE: 150 MMHG

## 2019-01-28 PROCEDURE — 76040000007: Performed by: INTERNAL MEDICINE

## 2019-01-28 PROCEDURE — 76060000032 HC ANESTHESIA 0.5 TO 1 HR: Performed by: INTERNAL MEDICINE

## 2019-01-28 PROCEDURE — 77030027957 HC TBNG IRR ENDOGTR BUSS -B: Performed by: INTERNAL MEDICINE

## 2019-01-28 PROCEDURE — 74011250636 HC RX REV CODE- 250/636

## 2019-01-28 PROCEDURE — 88305 TISSUE EXAM BY PATHOLOGIST: CPT

## 2019-01-28 PROCEDURE — 77030013992 HC SNR POLYP ENDOSC BSC -B: Performed by: INTERNAL MEDICINE

## 2019-01-28 RX ORDER — ATROPINE SULFATE 0.1 MG/ML
0.5 INJECTION INTRAVENOUS
Status: DISCONTINUED | OUTPATIENT
Start: 2019-01-28 | End: 2019-01-28 | Stop reason: HOSPADM

## 2019-01-28 RX ORDER — SODIUM CHLORIDE 0.9 % (FLUSH) 0.9 %
5-40 SYRINGE (ML) INJECTION AS NEEDED
Status: DISCONTINUED | OUTPATIENT
Start: 2019-01-28 | End: 2019-01-28 | Stop reason: HOSPADM

## 2019-01-28 RX ORDER — MIDAZOLAM HYDROCHLORIDE 1 MG/ML
.25-1 INJECTION, SOLUTION INTRAMUSCULAR; INTRAVENOUS
Status: DISCONTINUED | OUTPATIENT
Start: 2019-01-28 | End: 2019-01-28 | Stop reason: HOSPADM

## 2019-01-28 RX ORDER — PROPOFOL 10 MG/ML
INJECTION, EMULSION INTRAVENOUS AS NEEDED
Status: DISCONTINUED | OUTPATIENT
Start: 2019-01-28 | End: 2019-01-28 | Stop reason: HOSPADM

## 2019-01-28 RX ORDER — SODIUM CHLORIDE 9 MG/ML
50 INJECTION, SOLUTION INTRAVENOUS CONTINUOUS
Status: DISCONTINUED | OUTPATIENT
Start: 2019-01-28 | End: 2019-01-28 | Stop reason: HOSPADM

## 2019-01-28 RX ORDER — FENTANYL CITRATE 50 UG/ML
100 INJECTION, SOLUTION INTRAMUSCULAR; INTRAVENOUS
Status: DISCONTINUED | OUTPATIENT
Start: 2019-01-28 | End: 2019-01-28 | Stop reason: HOSPADM

## 2019-01-28 RX ORDER — NALOXONE HYDROCHLORIDE 0.4 MG/ML
0.4 INJECTION, SOLUTION INTRAMUSCULAR; INTRAVENOUS; SUBCUTANEOUS
Status: DISCONTINUED | OUTPATIENT
Start: 2019-01-28 | End: 2019-01-28 | Stop reason: HOSPADM

## 2019-01-28 RX ORDER — EPINEPHRINE 0.1 MG/ML
1 INJECTION INTRACARDIAC; INTRAVENOUS
Status: DISCONTINUED | OUTPATIENT
Start: 2019-01-28 | End: 2019-01-28 | Stop reason: HOSPADM

## 2019-01-28 RX ORDER — SODIUM CHLORIDE 0.9 % (FLUSH) 0.9 %
5-40 SYRINGE (ML) INJECTION EVERY 8 HOURS
Status: DISCONTINUED | OUTPATIENT
Start: 2019-01-28 | End: 2019-01-28 | Stop reason: HOSPADM

## 2019-01-28 RX ORDER — FLUMAZENIL 0.1 MG/ML
0.2 INJECTION INTRAVENOUS
Status: DISCONTINUED | OUTPATIENT
Start: 2019-01-28 | End: 2019-01-28 | Stop reason: HOSPADM

## 2019-01-28 RX ORDER — DEXTROMETHORPHAN/PSEUDOEPHED 2.5-7.5/.8
1.2 DROPS ORAL
Status: DISCONTINUED | OUTPATIENT
Start: 2019-01-28 | End: 2019-01-28 | Stop reason: HOSPADM

## 2019-01-28 RX ORDER — LIDOCAINE HYDROCHLORIDE 20 MG/ML
INJECTION, SOLUTION EPIDURAL; INFILTRATION; INTRACAUDAL; PERINEURAL AS NEEDED
Status: DISCONTINUED | OUTPATIENT
Start: 2019-01-28 | End: 2019-01-28 | Stop reason: HOSPADM

## 2019-01-28 RX ORDER — SODIUM CHLORIDE 9 MG/ML
INJECTION, SOLUTION INTRAVENOUS
Status: DISCONTINUED | OUTPATIENT
Start: 2019-01-28 | End: 2019-01-28 | Stop reason: HOSPADM

## 2019-01-28 RX ADMIN — PROPOFOL 20 MG: 10 INJECTION, EMULSION INTRAVENOUS at 14:13

## 2019-01-28 RX ADMIN — PROPOFOL 20 MG: 10 INJECTION, EMULSION INTRAVENOUS at 14:35

## 2019-01-28 RX ADMIN — PROPOFOL 20 MG: 10 INJECTION, EMULSION INTRAVENOUS at 14:14

## 2019-01-28 RX ADMIN — PROPOFOL 50 MG: 10 INJECTION, EMULSION INTRAVENOUS at 14:24

## 2019-01-28 RX ADMIN — PROPOFOL 50 MG: 10 INJECTION, EMULSION INTRAVENOUS at 14:10

## 2019-01-28 RX ADMIN — PROPOFOL 50 MG: 10 INJECTION, EMULSION INTRAVENOUS at 14:12

## 2019-01-28 RX ADMIN — PROPOFOL 20 MG: 10 INJECTION, EMULSION INTRAVENOUS at 14:28

## 2019-01-28 RX ADMIN — PROPOFOL 20 MG: 10 INJECTION, EMULSION INTRAVENOUS at 14:15

## 2019-01-28 RX ADMIN — PROPOFOL 20 MG: 10 INJECTION, EMULSION INTRAVENOUS at 14:38

## 2019-01-28 RX ADMIN — SODIUM CHLORIDE: 9 INJECTION, SOLUTION INTRAVENOUS at 13:48

## 2019-01-28 RX ADMIN — LIDOCAINE HYDROCHLORIDE 40 MG: 20 INJECTION, SOLUTION EPIDURAL; INFILTRATION; INTRACAUDAL; PERINEURAL at 14:40

## 2019-01-28 RX ADMIN — SODIUM CHLORIDE: 9 INJECTION, SOLUTION INTRAVENOUS at 14:29

## 2019-01-28 RX ADMIN — PROPOFOL 50 MG: 10 INJECTION, EMULSION INTRAVENOUS at 14:11

## 2019-01-28 RX ADMIN — PROPOFOL 30 MG: 10 INJECTION, EMULSION INTRAVENOUS at 14:26

## 2019-01-28 RX ADMIN — PROPOFOL 20 MG: 10 INJECTION, EMULSION INTRAVENOUS at 14:30

## 2019-01-28 RX ADMIN — PROPOFOL 20 MG: 10 INJECTION, EMULSION INTRAVENOUS at 14:32

## 2019-01-28 RX ADMIN — LIDOCAINE HYDROCHLORIDE 60 MG: 20 INJECTION, SOLUTION EPIDURAL; INFILTRATION; INTRACAUDAL; PERINEURAL at 14:09

## 2019-01-28 RX ADMIN — PROPOFOL 10 MG: 10 INJECTION, EMULSION INTRAVENOUS at 14:18

## 2019-01-28 NOTE — PROGRESS NOTES

## 2019-01-28 NOTE — H&P
118 [facility].  217 [facility] 140 [facility], [address]  [phone]                                History and Physical     NAME: Elise Mike   :  1955   MRN:  [id number]     HPI:  The patient was seen and examined. Past Surgical History:   Procedure Laterality Date    ABDOMEN SURGERY PROC UNLISTED          HX GYN      hysterectomy    HX GYN      hysterectomy, total for fibroid     Past Medical History:   Diagnosis Date    Arthritis     shoulders and knees.  Diabetes ([address]     insulin dependent     Diabetes ([address]     Hypercholesterolemia     Hypertension      Social History     Tobacco Use    Smoking status: Current Every Day Smoker     Packs/day: 2.00     Years: 41.00     Pack years: 82.00     Types: Cigarettes    Smokeless tobacco: Never Used   Substance Use Topics    Alcohol use: No     Frequency: Never     Binge frequency: Never    Drug use: No     No Known Allergies  Family History   Problem Relation Age of Onset    Diabetes Mother     Diabetes Father     Diabetes Sister     Diabetes Brother      No current facility-administered medications for this encounter. PHYSICAL EXAM:  General: WD, WN. Alert, cooperative, no acute distress    HEENT: NC, Atraumatic. PERRLA, EOMI. Anicteric sclerae. Lungs:  CTA Bilaterally. No Wheezing/Rhonchi/Rales. Heart:  Regular  rhythm,  No murmur, No Rubs, No Gallops  Abdomen: Soft, Non distended, Non tender.  +Bowel sounds, no HSM  Extremities: No c/c/e  Neurologic:  CN 2-12 gi, Alert and oriented X 3. No acute neurological distress   Psych:   Good insight. Not anxious nor agitated. The heart, lungs and mental status were satisfactory for the administration of MAC sedation and for the procedure.       Mallampati score: 2       Assessment:   · Screening, last colonoscopy > 10 yrs prior    Plan:   · Endoscopic procedure :cscope  · MAC sedation

## 2019-01-28 NOTE — ANESTHESIA PREPROCEDURE EVALUATION
Anesthetic History No history of anesthetic complications Review of Systems / Medical History Patient summary reviewed, nursing notes reviewed and pertinent labs reviewed Pulmonary Sleep apnea Smoker Neuro/Psych Psychiatric history Cardiovascular Hypertension GI/Hepatic/Renal 
Within defined limits Endo/Other Diabetes Obesity and arthritis Other Findings Physical Exam 
 
Airway Mallampati: II 
TM Distance: > 6 cm Neck ROM: normal range of motion Mouth opening: Normal 
 
 Cardiovascular Regular rate and rhythm,  S1 and S2 normal,  no murmur, click, rub, or gallop Dental 
No notable dental hx Pulmonary Breath sounds clear to auscultation Abdominal 
GI exam deferred Other Findings Anesthetic Plan ASA: 3 Anesthesia type: MAC Anesthetic plan and risks discussed with: Patient Paul Buchananu by son. Pt states that she does not want any male providers. Dr. Noble Bentley discussed with the pt that there would be no males in the room for electively, but that in the case of an emergency, male providers would be necessary. The pt understands and agrees.

## 2019-01-28 NOTE — PROCEDURES
[address]   217 [facility] [address], [address]  [phone]                              Colonoscopy Procedure Note      Indications:  Screening, average risk. Last colonoscopy > 10 years prior     :  Eladia Mckeon MD    Referring Provider: Damon Echeverria, NP    Sedation: MAC    Procedure Details:  After informed consent was obtained with all risks and benefits of procedure explained and preoperative exam completed, the patient was taken to the endoscopy suite and placed in the left lateral decubitus position. Upon sequential sedation as per above, a digital rectal exam was performed per below. The Olympus videocolonoscope was inserted in the rectum and carefully advanced to the cecum, which was identified by the ileocecal valve and appendiceal orifice. The quality of preparation was good. [place] Bowel Prep Score : 2/3/3. The colonoscope was slowly withdrawn with careful evaluation between folds. Retroflexion in the rectum was performed. Findings:   Rectum: small internal hemorrhoids  Sigmoid: normal  Descending Colon: Around the area of the splenic flexure, two polyps (10-12 mm), both semi-pedunculated, removed with hot snare. One 6 mm sessile polyp, removed with cold snare  Transverse Colon: normal  Ascending Colon: Two sessile colon polyps (8-10 mm), removed with cold snare  Cecum: 8 mm sessile polyp, removed with cold snare    Interventions:  polypectomy    Specimen Removed:    ID Type Source Tests Collected by Time Destination   1 : Cecal Polyp- Cold Snare Preservative   Torie Cavanaugh MD 1/28/2019 1420 Pathology   2 : Ascending Colon polyp- Cold Snare ervative   Torie Cavanaugh MD 1/28/2019 1424 Pathology   3 : Descending Colon Polyp- Cold and Hot Snare Preservative   Torie Cavanaugh MD 1/28/2019 1434 Pathology       Complications: None.      EBL:  Minimal    Impression:    See Postoperative diagnosis above    Recommendations:   - Await pathology. You should receive a letter within 2 weeks. - Resume normal medications.  - Recommend repeat colonoscopy in 3 years. Discharge Disposition:  Home in the company of a  when able to ambulate.     Luis Harper MD  1/28/2019  2:50 PM

## 2019-01-28 NOTE — ANESTHESIA POSTPROCEDURE EVALUATION
Procedure(s): 
COLONOSCOPY 
ENDOSCOPIC POLYPECTOMY. 
 
<BSHSIANPOST> Visit Vitals BP 96/65 Pulse 79 Resp 16 Ht 5' 4\" (1.626 m) Wt 92.5 kg (204 lb) SpO2 99% Breastfeeding? No  
BMI 35.02 kg/m²

## 2019-01-28 NOTE — PROGRESS NOTES
Joceline Juli  1955  [id number]    Situation:  Verbal report received from: [id number] [facility] rn  Procedure: Procedure(s):  COLONOSCOPY  ENDOSCOPIC POLYPECTOMY    Background:    Preoperative diagnosis: FAMILY HISTORY OF MALIGNANT NEOPLASM OF GI TRACT/ALTERED BOWEL FUNCTION  Postoperative diagnosis: 1.- Colonic Polyps  2.- Small Internal Hemorrhoids    :  Dr. Carmen King  Assistant(s): Endoscopy Technician-1: Trisha Enamorado  Endoscopy RN-1: Bre Lopez RN    Specimens:   ID Type Source Tests Collected by Time Destination   1 : Cecal Polyp- Cold Snare Preservative   Tayler Hyatt MD 1/28/2019 1420 Pathology   2 : Ascending Colon polyp- Cold Snare Preservative   Tayler Hyatt MD 1/28/2019 1424 Pathology   3 : Descending Colon Polyp- Cold and Hot Snare Preservative   Tayler Hyatt MD 1/28/2019 1434 Pathology     H. Pylori  no    Assessment:  Intra-procedure medications   Anesthesia gave intra-procedure sedation and medications, see anesthesia flow sheet yes    Intravenous fluids: NS@ KVO     Vital signs stable  yes    Abdominal assessment: round and soft  yes    Recommendation:  Discharge patient per MD order yes.   Family or Friend  yes  Permission to share finding with family or friend yes

## 2019-01-28 NOTE — DISCHARGE INSTRUCTIONS
118 [facility]  [address] [id number] [facility]                                  Remi Braga  [id number]  1955    It was my pleasure seeing you for your procedure. You will also receive a summary report with the findings from this procedure and any further recommendations. If you had polyps removed or biopsies taken during your procedure, you will receive a separate letter from me within the next 2 weeks. If you don't receive this letter or if you have any questions, please call my office [phone]. Please take note of the post procedure instructions listed below. Abhinav Westes,    Dr. Renato Thapa    These instructions give you information on caring for yourself after your procedure. Call your doctor if you have any problems or questions after your procedure. HOME CARE  · Walk if you have belly cramping or gas. Walking will help get rid of the air and reduce the bloated feeling in your belly (abdomen). · Your IV site (where you received drugs) may be tender to touch. Place warm towels on the site; keep your arm up on two pillows if you have any swelling or soreness in the area. · You may shower. ACTIVITY:  · Take frequent rest periods and move at a slower pace for the next 24 hours. .  · You may resume your regular activity tomorrow if you are feeling back to normal.  · Do not drive or ride a bicycle for at least 24 hours (because of the medicine (anesthesia) used during the test). · Do not sign any important legal documents or use or operate any machinery for 24 hours  · Do not take sleeping medicines/nerve drugs for 24 hours unless the doctor tells you. · You can return to work/school tomorrow unless otherwise instructed. NUTRITION:  · Drink plenty of fluids to keep your pee (urine) clear or pale yellow  · Begin with a light meal and progress to your normal diet.  Heavy or fried foods are harder to digest and may make you feel sick to your stomach (nauseated). · Once you are feeling back to normal, you may resume your normal diet as instructed by your doctor. · Avoid alcoholic beverages for 24 hours or as instructed. IF YOU HAD BIOPSIES TAKEN OR POLYPS REMOVED DURING THE PROCEDURE:  · For the next 7 days, avoid all non-steroidal antiinflammatory medications such as Ibuprofen, Motrin, Advil, Alleve, Reina-seltzer, Goody's powder, BC powder. · If you do not have an heart condition that requires you to take a daily aspirin, you should avoid taking aspirin for 7 days. · Eat a soft diet for 24 hours. · Monitor your stools for any blood or dark black, tar-like, stools as this may be a sign of bleeding and if you see any blood, notify your doctor immediately. GET HELP RIGHT AWAY AND SEEK IMMEDIATE MEDICAL CARE IF:  · You have more than a spotting of blood in your stool. · You pass clumps of tissue (blood clots) or fill the toilet with blood. · Your belly is painfully swollen or puffy (abdominal distention). · You throw up (vomit). · You have a fever. · You have redness, pain or swelling at the IV site that last greater than two days. · You have abdominal pain or discomfort that is severe or gets worse throughout the day. Post-procedure diagnosis:    Six colonic polyps, all removed. Suspect all the polyps are pre-cancerous but not any cancer. Small Internal Hemorrhoids    Post-procedure recommendations:  - Await pathology. You should receive a letter within 2 weeks. - Resume normal medications.  - Recommend repeat colonoscopy in 3 years.

## 2019-01-31 ENCOUNTER — OFFICE VISIT (OUTPATIENT)
Dept: INTERNAL MEDICINE CLINIC | Age: 64
End: 2019-01-31

## 2019-01-31 VITALS
OXYGEN SATURATION: 97 % | RESPIRATION RATE: 18 BRPM | HEIGHT: 64 IN | TEMPERATURE: 96.3 F | BODY MASS INDEX: 34.5 KG/M2 | SYSTOLIC BLOOD PRESSURE: 126 MMHG | HEART RATE: 86 BPM | WEIGHT: 202.1 LBS | DIASTOLIC BLOOD PRESSURE: 77 MMHG

## 2019-01-31 DIAGNOSIS — E11.8 TYPE 2 DIABETES MELLITUS WITH COMPLICATION, WITH LONG-TERM CURRENT USE OF INSULIN (HCC): Primary | ICD-10-CM

## 2019-01-31 DIAGNOSIS — Z79.4 TYPE 2 DIABETES MELLITUS WITH COMPLICATION, WITH LONG-TERM CURRENT USE OF INSULIN (HCC): Primary | ICD-10-CM

## 2019-01-31 DIAGNOSIS — G89.29 CHRONIC BILATERAL LOW BACK PAIN WITHOUT SCIATICA: ICD-10-CM

## 2019-01-31 DIAGNOSIS — E66.01 SEVERE OBESITY (HCC): ICD-10-CM

## 2019-01-31 DIAGNOSIS — M54.50 CHRONIC BILATERAL LOW BACK PAIN WITHOUT SCIATICA: ICD-10-CM

## 2019-01-31 LAB — GLUCOSE POC: 300 MG/DL

## 2019-01-31 RX ORDER — GABAPENTIN 100 MG/1
200 CAPSULE ORAL 3 TIMES DAILY
Qty: 180 CAP | Refills: 3
Start: 2019-01-31 | End: 2019-05-20 | Stop reason: SDUPTHER

## 2019-01-31 RX ORDER — CYCLOBENZAPRINE HCL 10 MG
10 TABLET ORAL
Qty: 60 TAB | Refills: 0 | Status: SHIPPED | OUTPATIENT
Start: 2019-01-31 | End: 2019-07-08 | Stop reason: SDUPTHER

## 2019-01-31 NOTE — PATIENT INSTRUCTIONS
1. Gabapentin = 200 mg three times per day for diabetic nerve pain 2. Flexeril - can take 3 times daily AS NEEDED for muscle pain.  This medication may make you sleepy, fall risk/

## 2019-01-31 NOTE — PROGRESS NOTES
Pt here for Chief Complaint Patient presents with  Follow-up  Diabetes 1. Have you been to the ER, urgent care clinic since your last visit? Hospitalized since your last visit? No 
 
2. Have you seen or consulted any other health care providers outside of the [address] [place] [address] since your last visit? Include any pap smears or colon screening. No  
 
 
 
 
Pt c/o pain 8 of 10, Pt Denies taking anything for pain today PHQ over the last two weeks 1/31/2019 PHQ Not Done Active Diagnosis of Depression or Bipolar Disorder Little interest or pleasure in doing things - Feeling down, depressed, irritable, or hopeless - Total Score PHQ 2 -

## 2019-01-31 NOTE — PROGRESS NOTES
Subjective: (As above and below) Chief Complaint Patient presents with  Follow-up  Diabetes Jenae Stone is a 61y.o. year old female who presents for 3 mo f/u on DM and HTN She presents with her son-in-law, Candido Anderson.  waiver signed Diabetic Review of Systems - medication compliance: noncompliant much of the time, diabetic diet compliance: noncompliant much of the time, home glucose monitoring: is performed sporadically. She has an upcoming eye exam. 
 
She saw endo on 1/21/19, plan formulated for insulin which she has NOT been adhering to. She states that her sleep patterns are \"all over the place\". She sleeps some days, some night, naps frequently, sleeps late often. (sleep study not until 4/2019). She states that she has been taking insulin ONCE daily. She states that this is because she is \"afraid she will die\" if she takes the second dose - she is fearful of LOW blood sugars. Home readings have been in the 200-300'. She states that the few times her sugars were in the 120-140's she felt shaky. She eats eggs, vegetables, flatbreads. Chronic low back pain: per ortho notes, believed to be a combination of DM neuropathy and fibromyalgia. Per notes, her gabapentin was increased to 200mg TID, she states that she has not been taking this increased dose. She was also rx'd flexeril which she never took. She continues to have low back pain. Denies saddle numbness, leg weakness, falls or bowel/bladder dysfunction. She went to [facility] for wheelchair eval (still do not understand why she is wheelchair bound, she is able to walk some). She states that she was doing some PT but did not like it. She has upcoming appointments with: 
Psychiatry Smoking: continues to some 1-2 PPD, does not want to quit Reviewed PmHx, RxHx, FmHx, SocHx, AllgHx and updated in chart. Family History Problem Relation Age of Onset  Diabetes Mother  Diabetes Father  Diabetes Sister  Diabetes Brother Past Medical History:  
Diagnosis Date  Arthritis   
 shoulders and knees.  Diabetes ([facility] [address]   
 insulin dependent  Diabetes ([facility] [address]  Hypercholesterolemia  Hypertension Social History Socioeconomic History  Marital status:  Spouse name: Not on file  Number of children: Not on file  Years of education: Not on file  Highest education level: Not on file Tobacco Use  Smoking status: Current Every Day Smoker Packs/day: 2.00 Years: 41.00 Pack years: 82.00 Types: Cigarettes  Smokeless tobacco: Never Used Substance and Sexual Activity  Alcohol use: No  
  Frequency: Never Binge frequency: Never  Drug use: No  
 Sexual activity: No  
Social History Narrative ** Merged History Encounter **  
    
  
 
 
Current Outpatient Medications Medication Sig  
 gabapentin (NEURONTIN) 100 mg capsule Take 2 Caps by mouth three (3) times daily.  cyclobenzaprine (FLEXERIL) 10 mg tablet Take 1 Tab by mouth three (3) times daily as needed for Muscle Spasm(s).  insulin aspart protamine/insulin aspart (NOVOLOG MIX 70-30FLEXPEN U-100) 100 unit/mL (70-30) inpn 40 units at noon (first meal) and 40 units at 7 pm (second meal)  pravastatin (PRAVACHOL) 20 mg tablet Take 1 Tab by mouth nightly.  metFORMIN (GLUCOPHAGE) 1,000 mg tablet Take 1 Tab by mouth two (2) times daily (with meals).  lisinopril (PRINIVIL, ZESTRIL) 5 mg tablet Take 1 Tab by mouth daily.  cholecalciferol, VITAMIN D3, (VITAMIN D3) 5,000 unit tab tablet Take 1 Tab by mouth daily.  buPROPion XL (WELLBUTRIN XL) 300 mg XL tablet Take 1 Tab by mouth every morning.  albuterol (PROVENTIL HFA, VENTOLIN HFA, PROAIR HFA) 90 mcg/actuation inhaler Take 1 Puff by inhalation every six (6) hours as needed for Wheezing.   
 FREESTYLE LANCETS 28 gauge misc TESTING 3 TIMES A DAY  
  Insulin Needles, Disposable, (BD ULTRA-FINE MINI PEN NEEDLE) 31 gauge x 3/16\" ndle Use to inject insulin twice daily  glucose blood VI test strips (FREESTYLE INSULINX) strip Check sugar 3 times a day  lancets 32 gauge misc 1 Each by Does Not Apply route three (3) times daily.  Blood-Glucose Meter (FREESTYLE FLASH SYSTEM) monitoring kit Test 3 Times Daily   DX E11.40,  E11.65 No current facility-administered medications for this visit. Review of Systems:  
Constitutional:    Negative for fever and chills, negative diaphoresis. HEENT:              Negative for neck pain and stiffness. Eyes:                  Negative for visual disturbance, itching, redness or discharge. Respiratory:        Negative for cough and shortness of breath. Cardiovascular:  Negative for chest pain and palpitations. Gastrointestinal: Negative for nausea, vomiting, abdominal pain, diarrhea or constipation. Genitourinary:     Negative for dysuria and frequency. Musculoskeletal: Negative for falls, tenderness and swelling. Skin:                    Negative for rash, masses or lesions. Neurological:       Negative for dizzyness, seizure, loss of consciousness, weakness and numbness. Objective:  
 
Vitals:  
 01/31/19 1324 BP: 126/77 Pulse: 86 Resp: 18 Temp: 96.3 °F (35.7 °C) TempSrc: Oral  
SpO2: 97% Weight: 202 lb 1.6 oz (91.7 kg) Height: 5' 4\" (1.626 m) Results for orders placed or performed in visit on 01/31/19 AMB POC GLUCOSE BLOOD, BY GLUCOSE MONITORING DEVICE Result Value Ref Range Glucose  mg/dL Physical Examination: General appearance - alert, well appearing, and in no distress Chest - clear to auscultation, no wheezes, rales or rhonchi, symmetric air entry Heart - normal rate, regular rhythm, normal S1, S2, no murmurs, rubs, clicks or gallops Extremities - no pedal edema noted Assessment/ Plan:  
Follow-up Disposition: Return in about 2 months (around 3/31/2019) for dm. After discussion about dangers of DM, she agrees to take insulin twice daily w/ meals. She will follow up w/ endo She declines PT Will try increase in gloria and prn flexeril for pain per ortho reccs, reviewed medication risks She is not interested in smoking cessation at this time 1. Type 2 diabetes mellitus with complication, with long-term current use of insulin ([facility] [address] Intended to do foot exam but 30 minutes spent discussing need to take medications - AMB POC GLUCOSE BLOOD, BY GLUCOSE MONITORING DEVICE 2. Chronic bilateral low back pain without sciatica Discussed medications/fall risk 
- gabapentin (NEURONTIN) 100 mg capsule; Take 2 Caps by mouth three (3) times daily. Dispense: 180 Cap; Refill: 3 
- cyclobenzaprine (FLEXERIL) 10 mg tablet; Take 1 Tab by mouth three (3) times daily as needed for Muscle Spasm(s). Dispense: 60 Tab; Refill: 0 I have discussed the diagnosis with the patient and the intended plan as seen in the above orders. The patient has received an after-visit summary and questions were answered concerning future plans. Pt conveyed understanding of plan. Medication Side Effects and Warnings were discussed with patient: yes Patient Labs were reviewed: yes Patient Past Records were reviewed:  yes Sejal Lopez NP

## 2019-04-08 ENCOUNTER — OFFICE VISIT (OUTPATIENT)
Dept: INTERNAL MEDICINE CLINIC | Age: 64
End: 2019-04-08

## 2019-04-08 VITALS
OXYGEN SATURATION: 94 % | HEART RATE: 84 BPM | BODY MASS INDEX: 33.48 KG/M2 | RESPIRATION RATE: 18 BRPM | TEMPERATURE: 97.9 F | HEIGHT: 64 IN | DIASTOLIC BLOOD PRESSURE: 61 MMHG | WEIGHT: 196.1 LBS | SYSTOLIC BLOOD PRESSURE: 110 MMHG

## 2019-04-08 DIAGNOSIS — J02.0 STREPTOCOCCAL PHARYNGITIS: ICD-10-CM

## 2019-04-08 DIAGNOSIS — F33.9 MAJOR DEPRESSIVE DISORDER, RECURRENT EPISODE WITH ANXIOUS DISTRESS (HCC): ICD-10-CM

## 2019-04-08 DIAGNOSIS — Z79.4 TYPE 2 DIABETES MELLITUS WITH HYPERGLYCEMIA, WITH LONG-TERM CURRENT USE OF INSULIN (HCC): Primary | ICD-10-CM

## 2019-04-08 DIAGNOSIS — R05.9 COUGH: ICD-10-CM

## 2019-04-08 DIAGNOSIS — E11.65 TYPE 2 DIABETES MELLITUS WITH HYPERGLYCEMIA, WITH LONG-TERM CURRENT USE OF INSULIN (HCC): Primary | ICD-10-CM

## 2019-04-08 DIAGNOSIS — F17.200 SMOKER: ICD-10-CM

## 2019-04-08 LAB
HBA1C MFR BLD HPLC: 9.3 %
QUICKVUE INFLUENZA TEST: NEGATIVE
S PYO AG THROAT QL: POSITIVE
VALID INTERNAL CONTROL?: YES
VALID INTERNAL CONTROL?: YES

## 2019-04-08 RX ORDER — AZITHROMYCIN 250 MG/1
TABLET, FILM COATED ORAL
Qty: 6 TAB | Refills: 0 | Status: SHIPPED | OUTPATIENT
Start: 2019-04-08 | End: 2019-07-08 | Stop reason: ALTCHOICE

## 2019-04-08 RX ORDER — PHENOL/SODIUM PHENOLATE
20 AEROSOL, SPRAY (ML) MUCOUS MEMBRANE DAILY
COMMUNITY
End: 2019-07-08 | Stop reason: SDUPTHER

## 2019-04-08 NOTE — PROGRESS NOTES
Subjective: (As above and below) Chief Complaint Patient presents with  Follow-up Pt ststes she has flu and had fever and cough  Diabetes  Eye Problem Pt family states she went eye doctor and they states she needs eye sugery Terra Nobles is a 61y.o. year old female who presents for DM2 & following concerns. Ukrainian speaking, here w/ daughter for translation Diabetic Review of Systems - medication compliance: compliant most of the time, diabetic diet compliance: compliant most of the time, home glucose monitoring: is performed sporadically. Due for DM f/u w/ endo. Reports diet improvments Eye exam: had eye exam, was told she needs surgery - unable to state which type Sick visit: reports sore throat x 2 days. She is able to swallow. She has felt like she has had a fever and believes she has the flu. She has not checked temp,however. She has a cough that is ongoing - several months- clear to light yellow phlegm. Continues to smoke - sometimes up to 2 PPD. Has albuterol inhaler uses a few times per week. Was around some children w/ suspected flu. Jackson Schmitt Depression: has upcoming psych appt. Never started welbutrin. Suspected sleep apnea: has upcoming appt w/ sleep med Reviewed PmHx, RxHx, FmHx, SocHx, AllgHx and updated in chart. Family History Problem Relation Age of Onset  Diabetes Mother  Diabetes Father  Diabetes Sister  Diabetes Brother Past Medical History:  
Diagnosis Date  Arthritis   
 shoulders and knees.  Diabetes ([address]   
 insulin dependent  Diabetes ([address]  Hypercholesterolemia  Hypertension Social History Socioeconomic History  Marital status:  Spouse name: Not on file  Number of children: Not on file  Years of education: Not on file  Highest education level: Not on file Tobacco Use  Smoking status: Current Every Day Smoker Packs/day: 2.00   Years: 41.00  
 Pack years: 82.00 Types: Cigarettes  Smokeless tobacco: Never Used Substance and Sexual Activity  Alcohol use: No  
  Frequency: Never Binge frequency: Never  Drug use: No  
 Sexual activity: Never Social History Narrative ** Merged History Encounter **  
    
  
 
 
Current Outpatient Medications Medication Sig  Omeprazole delayed release (PRILOSEC D/R) 20 mg tablet Take 20 mg by mouth daily.  azithromycin (ZITHROMAX) 250 mg tablet Take 2 tablets today, then take 1 tablet daily  gabapentin (NEURONTIN) 100 mg capsule Take 2 Caps by mouth three (3) times daily.  cyclobenzaprine (FLEXERIL) 10 mg tablet Take 1 Tab by mouth three (3) times daily as needed for Muscle Spasm(s).  insulin aspart protamine/insulin aspart (NOVOLOG MIX 70-30FLEXPEN U-100) 100 unit/mL (70-30) inpn 40 units at noon (first meal) and 40 units at 7 pm (second meal)  pravastatin (PRAVACHOL) 20 mg tablet Take 1 Tab by mouth nightly.  metFORMIN (GLUCOPHAGE) 1,000 mg tablet Take 1 Tab by mouth two (2) times daily (with meals).  lisinopril (PRINIVIL, ZESTRIL) 5 mg tablet Take 1 Tab by mouth daily.  cholecalciferol, VITAMIN D3, (VITAMIN D3) 5,000 unit tab tablet Take 1 Tab by mouth daily.  albuterol (PROVENTIL HFA, VENTOLIN HFA, PROAIR HFA) 90 mcg/actuation inhaler Take 1 Puff by inhalation every six (6) hours as needed for Wheezing.  FREESTYLE LANCETS 28 gauge misc TESTING 3 TIMES A DAY  Insulin Needles, Disposable, (BD ULTRA-FINE MINI PEN NEEDLE) 31 gauge x 3/16\" ndle Use to inject insulin twice daily  glucose blood VI test strips (FREESTYLE INSULINX) strip Check sugar 3 times a day  lancets 32 gauge misc 1 Each by Does Not Apply route three (3) times daily.  Blood-Glucose Meter (FREESTYLE FLASH SYSTEM) monitoring kit Test 3 Times Daily   DX E11.40,  E11.65 No current facility-administered medications for this visit. Review of Systems: Constitutional:    Negative for fever and chills, negative diaphoresis. HEENT:              Negative for neck pain and stiffness. Eyes:                  Negative for visual disturbance, itching, redness or discharge. Respiratory:        + for cough and shortness of breath. Cardiovascular:  Negative for chest pain and palpitations. Gastrointestinal: Negative for nausea, vomiting, abdominal pain, diarrhea or constipation. Genitourinary:     Negative for dysuria and frequency. Musculoskeletal: Negative for falls, tenderness and swelling. Skin:                    Negative for rash, masses or lesions. Neurological:       Negative for dizzyness, seizure, loss of consciousness, weakness and numbness. Objective:  
 
Vitals:  
 04/08/19 1524 BP: 110/61 Pulse: 84 Resp: 18 Temp: 97.9 °F (36.6 °C) TempSrc: Oral  
SpO2: 94% Weight: 196 lb 1.6 oz (89 kg) Height: 5' 4\" (1.626 m) Results for orders placed or performed in visit on 04/08/19 AMB POC HEMOGLOBIN A1C Result Value Ref Range Hemoglobin A1c (POC) 9.3 % AMB POC RAPID INFLUENZA TEST Result Value Ref Range VALID INTERNAL CONTROL POC Yes QuickVue Influenza test Negative Negative AMB POC RAPID STREP A Result Value Ref Range VALID INTERNAL CONTROL POC Yes Group A Strep Ag Positive Negative Physical Examination: General appearance - alert, well appearing, and in no distress and overweight. Smells heavily of smoke Mouth - mucous membranes moist, pharynx  without lesions. Mild erythema Neck - supple, no significant adenopathy Chest - clear to auscultation, no wheezes, rales or rhonchi, symmetric air entry Heart - normal rate, regular rhythm, normal S1, S2, no murmurs, rubs, clicks or gallops Extremities - no pedal edema noted Assessment/ Plan:  
 
Follow-up and Dispositions · Return in about 3 months (around 7/8/2019), or if symptoms worsen or fail to improve. 1. Type 2 diabetes mellitus with hyperglycemia, with long-term current use of insulin ([facility] [address] Improving, is followed by endo, encouraged continued diet improvments - AMB POC HEMOGLOBIN A1C 
 
2. Major depressive disorder, recurrent episode with anxious distress ([facility] Will hold off on welbutrin since appt w/ psych soon 3. Cough - AMB POC RAPID INFLUENZA TEST 
- AMB POC RAPID STREP A 
 
4. Smoker - XR CHEST PA LAT; Future 5. Streptococcal pharyngitis - AMB POC RAPID STREP A 
- azithromycin (ZITHROMAX) 250 mg tablet; Take 2 tablets today, then take 1 tablet daily  Dispense: 6 Tab; Refill: 0 I have discussed the diagnosis with the patient and the intended plan as seen in the above orders. The patient has received an after-visit summary and questions were answered concerning future plans. Pt conveyed understanding of plan. Medication Side Effects and Warnings were discussed with patient: yes Patient Labs were reviewed: yes Patient Past Records were reviewed:  yes Sejal Murcia NP

## 2019-04-08 NOTE — PATIENT INSTRUCTIONS
1. Please call the diabetes doctor Dr. Francheska Rojas to schedule your follow up - I do not see that you have one scheduled. [phone] 2. Please make sure you keep your appointment with the sleep center and the psychiatrist 
 
3. Your diabetes is improving! Continue to work on improving your diet 4. Please have your mammogram done 5. Your strept test is positive - this is a bacterial infection in your throat - I have sent an antibiotic to your pharmacy - please make sure you complete all of the medicine 6. Your flu test is negative 7. I have ordered a chest xray to check your lungs. It is very important that you stop smoking or else your cough will only get worse ÇáÊåÇÈ ÇáÍáÞ: ÅÑÔÇÏÇÊ ÇáÑÚÇíÉ [ Strep Throat: Care Instructions ] ÅÑÔÇÏÇÊ ÇáÑÚÇíÉ ÇáÎÇÕÉ Èß 
 
 
Åä ÇáÊåÇÈ ÇáÍáÞ åæ ÚÏæì ÈßÊíÑíÉ ÊÊÓÈÈ Ýí ÇáÅÕÇÈÉ ÈÇáÊåÇÈ ãÝÇÌÆ æÍÇÏ ÈÇáÍáÞ æÇáÍãì. íÊã ÚáÇÌ ÇáÊåÇÈ ÇáÍáÞ ÇáÐí íõÓÈÈå ÈßÊÑíÇ ÊõÓãì ÚöÞúÏöíøóÉ ÈÇáãÖÇÏÇÊ ÇáÍíæíÉ. ÃÍíÇäðÇ íßæä ãä ÇáÖÑæÑí ÅÌÑÇÁ ÝÍÕ ÈßÊÑíÇ áãÚÑÝÉ ÅÐÇ ãÇ ßÇä ÇáÊåÇÈ ÇáÍáÞ ãä ÈßÊÑíÇ ÇáÚÞÏíÉ. íãßä Ãä íõÓÇÚÏ ÇáÚáÇÌ Ýí ÊÎÝíÝ ÇáÃÚÑÇÖ æÞÏ íÞí ãä ãÔÇßá ãÓÊÞÈáíÉ. ÊõÚÏ ÑÚÇíÉ ÇáãÊÇÈÚÉ ÌÒÁðÇ ÃÓÇÓíðÇ Ýí ÚáÇÌß æÓáÇãÊß. ÝÚáíß ÇáÍÑÕ Úáì ÊÑÊíÈ ÌãíÚ ãæÇÚíÏ ÒíÇÑÉ ÇáØÈíÈ UPPLPICKE ÈåÇ¡ IDYRXABH ÈØÈíÈß ÚäÏ TNCJDGII ãä Ãí ãÔßáÇÊ. æãä ÇáÌíÏ ÃíÖðÇ Ãä ÊÚÑÝ äÊÇÆÌ HWYPLHAK æßÐáß VBHETHJN ÈÞÇÆãÉ ÇáÃÏæíÉ ÇáÊí TDONIRTL. ßíÝ íãßäß ÇáÇÚÊäÇÁ ÈäÝÓß Ýí ÇáãäÒá ? · ÊäÇæáí KGQBIXMH ÇáÍíæíÉ æÝÞðÇ áÅÑÔÇÏÇÊ ÇáØÈíÈ. æáÇ ÊÊæÞÝ Úä UMOSUSE áãÌÑÏ Ãäß ÊÔÚÑ ÈÊÍÓä. Wilfredo Karst íáÒãß ÊäÇæá ãÞÑÑ ÇáãÖÇÏ ÇáÍíæí ÈÇáßÇãá. ? · íãßä Ãä íäÊÔÑ ÇáÊåÇÈ ÇáÍáÞ Åáì ÇáÃÎÑíä ÍÊì 24 ÓÇÚÉ ÈÚÏ ÈÏÆß Ýí ÊÚÇØí ÇáãÖÇÏÇÊ ÇáÍíæíÉ. ÃËäÇÁ åÐÇ ÇáæÞÊ¡ íÌÈ Úáíß ÊÌäÈ WWSYGIE ãÚ ÇáÃÔÎÇÕ ÇáÃÎÑíä Ýí ÇáÚãá Ãæ YGOGTS æÎÇÕÉ ÇáÑÖÚ BCCHTYEC. áÇ ÊÚØÓ Ãæ ÊõÓÚá Ýí æÌå ÇáÃÎÑíä æÇÛÓá íÏíß ÏÇÆãðÇ. ÇÈÞö Úáì ÒÌÇÌÉ ÇáÔÑÈ æÃÏæÇÊ ÇáØÚÇã ãäÝÕáÉ Úä Êáß ÇáÎÇÕÉ ÈÇáÃÎÑíä æÇÛÓá åÐå ÇáÃÏæÇÊ ÌíÏðÇ ÈãÇÁ ÓÇÎä ÈÕÇÈæä. ? · ÊÛÑÛÑ ÈãÇÁ ÏÇÝÆ ããáÍ ãÑÉ æÇÍÏÉ ßá ÓÇÚÉ Úáì ÇáÃÞá áÊÓÇÚÏ Ýí ÊÞáíá ÇáæÑã æáÌÚá ÍáÞß íÊÍÓä. ÖÚ ãáÚÞÉ ÕÛíÑÉ ãä ÇáãáÍ æÇãÒÌåÇ Ýí 8 ÃæäÕÇÊ ãä ÇáãÇÁ ÇáÏÇÝÆ. ? · ÊäÇæá ÃÍÏ ÃÏæíÉ ÊÓßíä ÇáÃáã ÇáÊí ÊõÕÑÝ Ïæä æÕÝÉ ØÈíÉ ãËá ÃÓíÊÇãíäæÝíä (ÊÇíáíäæá) Ãæ ÅíÈæÈÑæÝíä (ÃÏÝíá¡ ãæÊÑíä) Ãæ äÇÈÑæßÓíä (ÃáíÝ). ÇÞÑÃ XJERDVGNZ VBCOWZW JCO XPJFIB DWDJDPN. ? · ÌÑÈ GQNEHYH ÈÎÇÎÉ ÊÎÏíÑ ÇáÍáÞ Ãæ ÊäÇæá ÍÈæÈ ãÕ áÚáÇÌ Ãáã ÇáÍáÞ æÇáÊí ÊÕÑÝ ÈÏæä æÕÝÉ ØÈíÉ æÞÏ ÊõÓÇÚÏ Ýí ÊÎÝíÝ Ãáã ÇáÍáÞ. ? · ÇÔÑÈ ßãíÉ æÇÝÑÉ ãä CVXQXJY. íãßä Ãä ÊÓÇÚÏ OXELBPM Ýí ÊÎÝíÝ ÊåíÌ ÇáÍáÞ. ßãÇ íãßä Ãä ÊÓÇÚÏ BGEKZGT ÇáÓÇÎäÉ ãËá ÇáÔÇí Ãæ ÇáÍÓÇÁ Ýí ÊÞáíá Ãáã ÇáÍáÞ. ? · ÊäÇæá ÇáÃØÚãÉ ÇáÕáÈÉ ÇáäÇÚãÉ æÇÔÑÈ ÇáßËíÑ ãä HMBCDYR ÇáÔÝÇÝÉ. ßãÇ ÞÏ ÊõåÏÁ Íáæì ãËáÌÉ ÈäßåÉ æÇáÂíÓ ßÑíã æÇáÈíÖ ÇáãÎÝæÞ æÇáÍáæì ÇáÌíáÇÊíäíÉ (ãËá Jell-O) ãä Ãáã ÇáÍáÞ. ? · ÎõÐ LWHVAMI ßÇÝíÉ ãä ÇáÑÇÍÉ. ? · ÇÞáÚ Úä ÇáÊÏÎíä æÊÌäÈ ÇáÊÏÎíä ÇáÓáÈí. ÅÐÇ ßäÊ ÈÍÇÌÉ Åáì ÇáãÓÇÚÏÉ ááÅÞáÇÚ Úä ÇáÊÏÎíä¡ ÝÚáíß ÇáÊÍÏË ãÚ ÇáØÈíÈ Íæá ÇáÈÑÇãÌ æÇáÃÏæíÉ ÇáÎÇÕÉ ÈÇáÊæÞÝ Úä ÇáÊÏÎíä. íãßä Ãä íÒíÏ åÐÇ ãä ÝÑÕ ÇáÅÞáÇÚ Úä ÇáÊÏÎíä äåÇÆíðÇ. ? · ÇÓÊÎÏã PHZLIRFX Ãæ ãÑØøöÈðÇ áÅÖÇÝÉ ÇáÑØæÈÉ Åáì ÇáåæÇÁ Ýí ÛÑÝÉ äæãß. ÇÊÈÚ YNPZVVLSG ÇáÎÇÕÉ ÈÊäÙíÝ åÐÇ ÇáÌåÇÒ. ãÊì íäÈÛí áß ÇáÇÊÕÇá áØáÈ ÇáãÓÇÚÏÉ
 
Úáíß ÇáÇÊÕÇá ÈØÈíÈß Úáì ÇáÝæÑ Ãæ ØáÈ ÑÚÇíÉ ØÈíÉ ÝæÑíÉ Ýí UZVQLPC ÇáÊÇáíÉ: ? · ÅÐÇ ÒÇÏÊ áÏíß ÇáÍãøì Ãæ ÃÕÈÊ ÈÍãøì ÌÏíÏÉ. ? · ÊÚÇäí ãä Íãì NFKHHM ÈÊÕáÈ Ýí ÇáÑÞÈÉ Ãæ ÕÏÇÚ ÍÇÏ. ? · HVZJXHJ ãä ãÔßáÇÊ ÌÏíÏÉ Ýí ÇáÈáÚ Ãæ ÊÝÇÞã ALKMIQVZ ÇáÞÇÆãÉ. ? · ÒíÇÏÉ ÇáÊåÇÈ ÇáÍáÞ Úáì ÃÍÏ ÇáÌæÇäÈ. ? · ÇÒÏÇÏ ÇáÃáÃã ÓæÁðÇ Úáì ÌÇäÈ æÇÍÏ ãä ÇáÍáÞ. Úáíß ãÑÇÞÈÉ Ãí ÊÛíÑÇÊ ÊØÑÃ Úáì ÕÍÊß ÌíÏðÇ¡ BQAZPT Úáì QSKVFOX ÈÇáØÈíÈ Ýí MQCUNEU ÇáÊÇáíÉ: ? · ÚÏã ÊÍÓä ÍÇáÊß ÈÚÏ íæãíä (48 ÓÇÚÉ). ? · ÅÐÇ áã ÊÊÍÓä ÍÇáÊß ßãÇ åæ ãÊæÞÚ. Ãíä íãßä ãÚÑÝÉ ÇáãÒíÏ ÇäÊÞÇá Åáì [email or web address] ÏÎæá [address] íãßäß ãÚÑÝÉ ÇáãÒíÏ ãä ÎáÇá ãÑÈÚ ÇáÈÍË \"ÇáÊåÇÈ ÇáÍáÞ: ÅÑÔÇÏÇÊ ÇáÑÚÇíÉ - [ Strep Throat: Care Instructions ]. \" 
© 8868-0138 [employer].  ÊãÊ ÊåíÆÉ ÅÑÔÇÏÇÊ ÇáÚäÇíÉ ÈãæÌÈ ÊÑÎíÕ ãä ãÎÊÕ ÇáÑÚÇíÉ ÇáÕÍíÉ áÏíß. ÅÐÇ ßÇäÊ áÏíß ÃíÉ WMHAULEHU Úä ÍÇáÉ ØÈíÉ Ãæ Ãí ãä åÐå QVGAFKZZF¡ ÝÊæÌå ÏæãðÇ CGYXHHH Åáì ãÎÊÕ ÇáÑÚÇíÉ ÇáÕÍíÉ. ÊäÝí ãäÙãÉ [employer] Adela Kussmaul ÖãÇä Ãæ Cj Sarah [id number] åÐå [id number]. ÅÕÏÇÑ ÇáãÍÊæì: 11.9 ãÍÏøË [id number] ãä: 10 ÑÌÈ [id number]

## 2019-04-08 NOTE — PROGRESS NOTES
Pt here for Chief Complaint Patient presents with  Follow-up Pt ststes she has flu and had fever and cough  Diabetes  Eye Problem Pt family states she went eye doctor and they states she needs eye sugery 1. Have you been to the ER, urgent care clinic since your last visit? Hospitalized since your last visit? No 
 
2. Have you seen or consulted any other health care providers outside of the [address] [place] [address] since your last visit? Include any pap smears or colon screening. No  
 
 
Pt c/o pain 8 of 10, Pt Denies taking anything for pain today 3 most recent PHQ Screens 4/8/2019 PHQ Not Done Medical Reason (indicate in comments) Little interest or pleasure in doing things - Feeling down, depressed, irritable, or hopeless - Total Score PHQ 2 -

## 2019-05-03 ENCOUNTER — TELEPHONE (OUTPATIENT)
Dept: FAMILY MEDICINE CLINIC | Age: 64
End: 2019-05-03

## 2019-05-03 NOTE — TELEPHONE ENCOUNTER
----- Message from Melissa Harper sent at 5/3/2019 11:53 AM EDT -----  Regarding: /Telephone  Contact: [phone]  Cornelia with [facility] [address] is requesting a call back regarding pt's office notes from last visit.

## 2019-05-03 NOTE — TELEPHONE ENCOUNTER
Called Cornelia at [facility], is requesting last office noted. Inform patient last seen 8/23/2018. State she will reach out to family regarding her recent office with another doctor.

## 2019-05-20 DIAGNOSIS — M54.50 CHRONIC BILATERAL LOW BACK PAIN WITHOUT SCIATICA: ICD-10-CM

## 2019-05-20 DIAGNOSIS — G89.29 CHRONIC BILATERAL LOW BACK PAIN WITHOUT SCIATICA: ICD-10-CM

## 2019-05-20 RX ORDER — GABAPENTIN 100 MG/1
200 CAPSULE ORAL 3 TIMES DAILY
Qty: 180 CAP | Refills: 3 | Status: SHIPPED | OUTPATIENT
Start: 2019-05-20 | End: 2019-07-08 | Stop reason: SDUPTHER

## 2019-06-04 ENCOUNTER — TELEPHONE (OUTPATIENT)
Dept: INTERNAL MEDICINE CLINIC | Age: 64
End: 2019-06-04

## 2019-06-04 NOTE — TELEPHONE ENCOUNTER
Two patient identifier confirmed   Spoke w / pt's daughter to schedule f/u diabetes and A1c appt for pt. Daughter stated that she do not speak english well and would have her  call back to schedule huber. Pt due for f/u anytime after 07/08/19.

## 2019-07-08 ENCOUNTER — OFFICE VISIT (OUTPATIENT)
Dept: INTERNAL MEDICINE CLINIC | Age: 64
End: 2019-07-08

## 2019-07-08 ENCOUNTER — HOSPITAL ENCOUNTER (OUTPATIENT)
Dept: MAMMOGRAPHY | Age: 64
Discharge: HOME | End: 2019-07-08
Attending: INTERNAL MEDICINE
Payer: MEDICAID

## 2019-07-08 ENCOUNTER — HOSPITAL ENCOUNTER (OUTPATIENT)
Dept: GENERAL RADIOLOGY | Age: 64
Discharge: HOME | End: 2019-07-08
Attending: INTERNAL MEDICINE
Payer: MEDICAID

## 2019-07-08 VITALS
WEIGHT: 204.1 LBS | TEMPERATURE: 98.5 F | HEART RATE: 78 BPM | HEIGHT: 64 IN | BODY MASS INDEX: 34.85 KG/M2 | SYSTOLIC BLOOD PRESSURE: 101 MMHG | DIASTOLIC BLOOD PRESSURE: 60 MMHG | RESPIRATION RATE: 20 BRPM | OXYGEN SATURATION: 95 %

## 2019-07-08 DIAGNOSIS — F32.A DEPRESSION, UNSPECIFIED DEPRESSION TYPE: ICD-10-CM

## 2019-07-08 DIAGNOSIS — M54.50 CHRONIC BILATERAL LOW BACK PAIN WITHOUT SCIATICA: Primary | ICD-10-CM

## 2019-07-08 DIAGNOSIS — F17.200 SMOKER: ICD-10-CM

## 2019-07-08 DIAGNOSIS — G89.29 CHRONIC BILATERAL LOW BACK PAIN WITHOUT SCIATICA: Primary | ICD-10-CM

## 2019-07-08 DIAGNOSIS — R06.09 DOE (DYSPNEA ON EXERTION): ICD-10-CM

## 2019-07-08 DIAGNOSIS — Z12.39 SCREENING BREAST EXAMINATION: ICD-10-CM

## 2019-07-08 LAB
GLUCOSE POC: 166 MG/DL
HBA1C MFR BLD HPLC: 8.6 %

## 2019-07-08 PROCEDURE — 77067 SCR MAMMO BI INCL CAD: CPT

## 2019-07-08 PROCEDURE — 71046 X-RAY EXAM CHEST 2 VIEWS: CPT

## 2019-07-08 RX ORDER — PHENOL/SODIUM PHENOLATE
20 AEROSOL, SPRAY (ML) MUCOUS MEMBRANE
Qty: 90 TAB | Refills: 0 | Status: SHIPPED | OUTPATIENT
Start: 2019-07-08 | End: 2019-07-08 | Stop reason: SDUPTHER

## 2019-07-08 RX ORDER — LISINOPRIL 5 MG/1
5 TABLET ORAL DAILY
Qty: 90 TAB | Refills: 0 | Status: SHIPPED | OUTPATIENT
Start: 2019-07-08 | End: 2019-07-08 | Stop reason: SDUPTHER

## 2019-07-08 RX ORDER — LANCETS 28 GAUGE
EACH MISCELLANEOUS
Qty: 100 LANCET | Refills: 2 | Status: SHIPPED | OUTPATIENT
Start: 2019-07-08 | End: 2019-07-08 | Stop reason: SDUPTHER

## 2019-07-08 RX ORDER — BUPROPION HYDROCHLORIDE 150 MG/1
150 TABLET, EXTENDED RELEASE ORAL 2 TIMES DAILY
Qty: 180 TAB | Refills: 0 | Status: SHIPPED | OUTPATIENT
Start: 2019-07-08 | End: 2019-11-26 | Stop reason: SDUPTHER

## 2019-07-08 RX ORDER — METFORMIN HYDROCHLORIDE 1000 MG/1
1000 TABLET ORAL 2 TIMES DAILY WITH MEALS
Qty: 180 TAB | Refills: 0 | Status: SHIPPED | OUTPATIENT
Start: 2019-07-08 | End: 2019-07-08 | Stop reason: SDUPTHER

## 2019-07-08 RX ORDER — PHENOL/SODIUM PHENOLATE
20 AEROSOL, SPRAY (ML) MUCOUS MEMBRANE
Qty: 90 TAB | Refills: 0 | Status: SHIPPED | OUTPATIENT
Start: 2019-07-08 | End: 2021-08-26 | Stop reason: SDUPTHER

## 2019-07-08 RX ORDER — CYCLOBENZAPRINE HCL 10 MG
10 TABLET ORAL
Qty: 60 TAB | Refills: 0 | Status: SHIPPED | OUTPATIENT
Start: 2019-07-08 | End: 2019-07-13

## 2019-07-08 RX ORDER — BUPROPION HYDROCHLORIDE 150 MG/1
150 TABLET, EXTENDED RELEASE ORAL 2 TIMES DAILY
Qty: 180 TAB | Refills: 0 | Status: SHIPPED | OUTPATIENT
Start: 2019-07-08 | End: 2019-07-08 | Stop reason: SDUPTHER

## 2019-07-08 RX ORDER — FLUTICASONE PROPIONATE AND SALMETEROL 100; 50 UG/1; UG/1
1 POWDER RESPIRATORY (INHALATION) 2 TIMES DAILY
Qty: 1 INHALER | Refills: 0 | Status: SHIPPED | OUTPATIENT
Start: 2019-07-08 | End: 2020-09-28 | Stop reason: ALTCHOICE

## 2019-07-08 RX ORDER — LANCETS 28 GAUGE
EACH MISCELLANEOUS
Qty: 100 LANCET | Refills: 2 | Status: SHIPPED | OUTPATIENT
Start: 2019-07-08 | End: 2020-01-30 | Stop reason: SDUPTHER

## 2019-07-08 RX ORDER — LISINOPRIL 5 MG/1
5 TABLET ORAL DAILY
Qty: 90 TAB | Refills: 0 | Status: SHIPPED | OUTPATIENT
Start: 2019-07-08 | End: 2019-08-08 | Stop reason: SDUPTHER

## 2019-07-08 RX ORDER — CHOLECALCIFEROL TAB 125 MCG (5000 UNIT) 125 MCG
5000 TAB ORAL DAILY
Qty: 90 TAB | Refills: 0 | Status: SHIPPED | OUTPATIENT
Start: 2019-07-08 | End: 2019-11-26 | Stop reason: SDUPTHER

## 2019-07-08 RX ORDER — INSULIN ASPART 100 [IU]/ML
INJECTION, SUSPENSION SUBCUTANEOUS
Qty: 10 PEN | Refills: 6 | Status: SHIPPED | OUTPATIENT
Start: 2019-07-08 | End: 2019-07-08 | Stop reason: SDUPTHER

## 2019-07-08 RX ORDER — FLUTICASONE PROPIONATE AND SALMETEROL 100; 50 UG/1; UG/1
1 POWDER RESPIRATORY (INHALATION) 2 TIMES DAILY
Qty: 1 INHALER | Refills: 0 | Status: SHIPPED | OUTPATIENT
Start: 2019-07-08 | End: 2019-07-08 | Stop reason: SDUPTHER

## 2019-07-08 RX ORDER — GABAPENTIN 100 MG/1
200 CAPSULE ORAL 3 TIMES DAILY
Qty: 180 CAP | Refills: 3 | Status: SHIPPED | OUTPATIENT
Start: 2019-07-08 | End: 2019-11-26 | Stop reason: SDUPTHER

## 2019-07-08 RX ORDER — METFORMIN HYDROCHLORIDE 1000 MG/1
1000 TABLET ORAL 2 TIMES DAILY WITH MEALS
Qty: 180 TAB | Refills: 0 | Status: SHIPPED | OUTPATIENT
Start: 2019-07-08 | End: 2019-11-26 | Stop reason: SDUPTHER

## 2019-07-08 RX ORDER — PRAVASTATIN SODIUM 20 MG/1
20 TABLET ORAL
Qty: 90 TAB | Refills: 0 | Status: SHIPPED | OUTPATIENT
Start: 2019-07-08 | End: 2019-11-26 | Stop reason: SDUPTHER

## 2019-07-08 RX ORDER — INSULIN ASPART 100 [IU]/ML
INJECTION, SUSPENSION SUBCUTANEOUS
Qty: 10 PEN | Refills: 6 | Status: SHIPPED | OUTPATIENT
Start: 2019-07-08 | End: 2019-11-26 | Stop reason: SDUPTHER

## 2019-07-08 RX ORDER — PRAVASTATIN SODIUM 20 MG/1
20 TABLET ORAL
Qty: 90 TAB | Refills: 0 | Status: SHIPPED | OUTPATIENT
Start: 2019-07-08 | End: 2019-07-08 | Stop reason: SDUPTHER

## 2019-07-08 RX ORDER — CYCLOBENZAPRINE HCL 10 MG
10 TABLET ORAL
Qty: 60 TAB | Refills: 0 | Status: SHIPPED | OUTPATIENT
Start: 2019-07-08 | End: 2019-07-08 | Stop reason: SDUPTHER

## 2019-07-08 RX ORDER — CHOLECALCIFEROL TAB 125 MCG (5000 UNIT) 125 MCG
5000 TAB ORAL DAILY
Qty: 90 TAB | Refills: 0 | Status: SHIPPED | OUTPATIENT
Start: 2019-07-08 | End: 2019-07-08 | Stop reason: SDUPTHER

## 2019-07-08 RX ORDER — ALBUTEROL SULFATE 90 UG/1
1 AEROSOL, METERED RESPIRATORY (INHALATION)
Qty: 1 INHALER | Refills: 1 | Status: SHIPPED | OUTPATIENT
Start: 2019-07-08 | End: 2019-07-08 | Stop reason: SDUPTHER

## 2019-07-08 RX ORDER — ALBUTEROL SULFATE 90 UG/1
1 AEROSOL, METERED RESPIRATORY (INHALATION)
Qty: 1 INHALER | Refills: 1 | Status: SHIPPED | OUTPATIENT
Start: 2019-07-08 | End: 2020-01-30 | Stop reason: SDUPTHER

## 2019-07-08 RX ORDER — PEN NEEDLE, DIABETIC 31 GX3/16"
NEEDLE, DISPOSABLE MISCELLANEOUS
Qty: 200 PEN NEEDLE | Refills: 3 | Status: SHIPPED | OUTPATIENT
Start: 2019-07-08 | End: 2019-07-08 | Stop reason: SDUPTHER

## 2019-07-08 RX ORDER — PEN NEEDLE, DIABETIC 31 GX3/16"
NEEDLE, DISPOSABLE MISCELLANEOUS
Qty: 200 PEN NEEDLE | Refills: 3 | Status: SHIPPED | OUTPATIENT
Start: 2019-07-08 | End: 2019-11-26 | Stop reason: SDUPTHER

## 2019-07-08 NOTE — PROGRESS NOTES
Subjective: (As above and below)     Chief Complaint   Patient presents with    Medication Evaluation    Follow-up     3 months recheck     Jazmin Lowry is a 59y.o. year old female who presents for HTN & DM2    Indonesian speaking, using  phone- failed to get ID #    Diabetic Review of Systems - medication compliance: compliant most of the time, diabetic diet compliance: compliant most of the time, home glucose monitoring: is performed sporadically. Values are \"all over the place\". Overdue for endo f/u. Has reduced bread intake    Smoking: continues to smoke up to 3 packs per day! Has rescue inhaler, using once daily, sometimes more. Never saw pulm. Depression: was previously on welbutrin but does not have - they state there are some issues w/ the pharmacy and request hard copy rx. She states that while she was on the welbutrin - her mood was better. It did not, however, help w/ smoking cessation    Chronic back pain; saw ortho, does not recall next plan. .. Continues to have LBP, radiating to both legs. Out of gabapentin which did help    Due for f/u w/ ophthalmology, family will call to schedule    Swelling; she reports swelling under her arms. .. Mammogram: due            Reviewed PmHx, RxHx, FmHx, SocHx, AllgHx and updated in chart. Family History   Problem Relation Age of Onset    Diabetes Mother     Diabetes Father     Diabetes Sister     Diabetes Brother        Past Medical History:   Diagnosis Date    Arthritis     shoulders and knees.     Diabetes ([address]     insulin dependent     Diabetes ([address]     Hypercholesterolemia     Hypertension       Social History     Socioeconomic History    Marital status:      Spouse name: Not on file    Number of children: Not on file    Years of education: Not on file    Highest education level: Not on file   Tobacco Use    Smoking status: Current Every Day Smoker     Packs/day: 2.00     Years: 41.00     Pack years: 82.00     Types: Cigarettes    Smokeless tobacco: Never Used   Substance and Sexual Activity    Alcohol use: No     Frequency: Never     Binge frequency: Never    Drug use: No    Sexual activity: Never   Social History Narrative    ** Merged History Encounter **               Current Outpatient Medications   Medication Sig    gabapentin (NEURONTIN) 100 mg capsule Take 2 Caps by mouth three (3) times daily.  Omeprazole delayed release (PRILOSEC D/R) 20 mg tablet Take 20 mg by mouth daily.  cyclobenzaprine (FLEXERIL) 10 mg tablet Take 1 Tab by mouth three (3) times daily as needed for Muscle Spasm(s).  insulin aspart protamine/insulin aspart (NOVOLOG MIX 70-30FLEXPEN U-100) 100 unit/mL (70-30) inpn 40 units at noon (first meal) and 40 units at 7 pm (second meal)    pravastatin (PRAVACHOL) 20 mg tablet Take 1 Tab by mouth nightly.  metFORMIN (GLUCOPHAGE) 1,000 mg tablet Take 1 Tab by mouth two (2) times daily (with meals).  lisinopril (PRINIVIL, ZESTRIL) 5 mg tablet Take 1 Tab by mouth daily.  albuterol (PROVENTIL HFA, VENTOLIN HFA, PROAIR HFA) 90 mcg/actuation inhaler Take 1 Puff by inhalation every six (6) hours as needed for Wheezing.  FREESTYLE LANCETS 28 gauge misc TESTING 3 TIMES A DAY    Insulin Needles, Disposable, (BD ULTRA-FINE MINI PEN NEEDLE) 31 gauge x 3/16\" ndle Use to inject insulin twice daily    glucose blood VI test strips (FREESTYLE INSULINX) strip Check sugar 3 times a day    lancets 32 gauge misc 1 Each by Does Not Apply route three (3) times daily.  Blood-Glucose Meter (FREESTYLE FLASH SYSTEM) monitoring kit Test 3 Times Daily   DX E11.40,  E11.65    azithromycin (ZITHROMAX) 250 mg tablet Take 2 tablets today, then take 1 tablet daily    cholecalciferol, VITAMIN D3, (VITAMIN D3) 5,000 unit tab tablet Take 1 Tab by mouth daily. No current facility-administered medications for this visit.         Review of Systems:   Constitutional:    Negative for fever and chills, negative diaphoresis. HEENT:              Negative for neck pain and stiffness. Eyes:                  Negative for visual disturbance, itching, redness or discharge. Respiratory:        Negative for cough and shortness of breath. +JAUREGUI  Cardiovascular:  Negative for chest pain and palpitations. Gastrointestinal: Negative for nausea, vomiting, abdominal pain, diarrhea or constipation. Genitourinary:     Negative for dysuria and frequency. Musculoskeletal: chronic LBP  Skin:                    Negative for rash, masses or lesions. Neurological:       Negative for dizzyness, seizure, loss of consciousness, weakness and numbness. Objective:     Vitals:    07/08/19 1352   BP: 101/60   Pulse: 78   Resp: 20   Temp: 98.5 °F (36.9 °C)   TempSrc: Oral   SpO2: 95%   Weight: 204 lb 1.6 oz (92.6 kg)   Height: 5' 4\" (1.626 m)     Results for orders placed or performed in visit on 07/08/19   AMB POC HEMOGLOBIN A1C   Result Value Ref Range    Hemoglobin A1c (POC) 8.6 %   AMB POC GLUCOSE BLOOD, BY GLUCOSE MONITORING DEVICE   Result Value Ref Range    Glucose  mg/dL           Physical Examination: General appearance - alert, well appearing, and in no distress and overweight  Chest - clear to auscultation, no wheezes, rales or rhonchi, symmetric air entry  Heart - normal rate, regular rhythm, normal S1, S2, no murmurs, rubs, clicks or gallops  Extremities - no pedal edema noted    Axilla: do not appreciate any significant swelling. No abscess. Appears to be normal fatty tissue    Assessment/ Plan:      Reprinted mammogram and previous cxr  They will call to f/u w/ ophthalmology  recc pulm f/u, however, they wish to defer at this time  Declines psychiatry  Will get notes from ortho    1. Chronic bilateral low back pain without sciatica    - gabapentin (NEURONTIN) 100 mg capsule; Take 2 Caps by mouth three (3) times daily. Dispense: 180 Cap; Refill: 3  - cyclobenzaprine (FLEXERIL) 10 mg tablet;  Take 1 Tab by mouth three (3) times daily as needed for Muscle Spasm(s). Dispense: 60 Tab; Refill: 0    2. DM type 2, uncontrolled, with neuropathy (HCC)  improving  - AMB POC HEMOGLOBIN A1C  - AMB POC URINE, MICROALBUMIN, SEMIQUANT (3 RESULTS)  - AMB POC GLUCOSE BLOOD, BY GLUCOSE MONITORING DEVICE  - METABOLIC PANEL, COMPREHENSIVE  - glucose blood VI test strips (FREESTYLE INSULINX) strip; Check sugar 3 times a day  Dispense: 300 Strip; Refill: 4  - insulin aspart protamine/insulin aspart (NOVOLOG MIX 70-30FLEXPEN U-100) 100 unit/mL (70-30) inpn; 40 units at noon (first meal) and 40 units at 7 pm (second meal)  Dispense: 10 Pen; Refill: 6  - Insulin Needles, Disposable, (BD ULTRA-FINE MINI PEN NEEDLE) 31 gauge x 3/16\" ndle; Use to inject insulin twice daily  Dispense: 200 Pen Needle; Refill: 3  - lancets 32 gauge misc; 1 Each by Does Not Apply route three (3) times daily. Dispense: 300 Lancet; Refill: 3    3. Smoker    - buPROPion SR (WELLBUTRIN SR) 150 mg SR tablet; Take 1 Tab by mouth two (2) times a day. Take 1 tab daily x 1 week then increase to twice daily- for depression & smoking  Dispense: 180 Tab; Refill: 0    4. Depression, unspecified depression type  Declines psychiatry  - buPROPion SR (WELLBUTRIN SR) 150 mg SR tablet; Take 1 Tab by mouth two (2) times a day. Take 1 tab daily x 1 week then increase to twice daily- for depression & smoking  Dispense: 180 Tab; Refill: 0    5. JAUREGUI (dyspnea on exertion)  Encouraged smoking cessation. - albuterol (PROVENTIL HFA, VENTOLIN HFA, PROAIR HFA) 90 mcg/actuation inhaler; Take 1 Puff by inhalation every six (6) hours as needed for Wheezing. Dispense: 1 Inhaler; Refill: 1  - fluticasone propion-salmeterol (ADVAIR/WIXELA) 100-50 mcg/dose diskus inhaler; Take 1 Puff by inhalation two (2) times a day. Dispense: 1 Inhaler; Refill: 0            I have discussed the diagnosis with the patient and the intended plan as seen in the above orders.   The patient has received an after-visit summary and questions were answered concerning future plans. Pt conveyed understanding of plan. Medication Side Effects and Warnings were discussed with patient: yes  Patient Labs were reviewed: yes  Patient Past Records were reviewed:  yes    Ayden Guerra.  Andreea Krishnamurthy NP

## 2019-07-08 NOTE — PROGRESS NOTES
Pt here for   Chief Complaint   Patient presents with    Medication Evaluation    Follow-up     3 months recheck     1. Have you been to the ER, urgent care clinic since your last visit? Hospitalized since your last visit? No    2. Have you seen or consulted any other health care providers outside of the [address] [place] [address] since your last visit? Include any pap smears or colon screening.  No       Pt c/o pain 10 of 10, Pt denies taking anything today for pain       3 most recent PHQ Screens 7/8/2019   PHQ Not Done Active Diagnosis of Depression or Bipolar Disorder   Little interest or pleasure in doing things -   Feeling down, depressed, irritable, or hopeless -   Total Score PHQ 2 -

## 2019-07-08 NOTE — LETTER
7/8/2019 3:33 PM 
 
Ms. [address] [address] [facility] [address] Dear Trevin Lepe: 
 
Your chest xray is normal. 
 
Resulted Orders XR CHEST PA LAT (Exam End: 7/8/2019  3:19 PM) Narrative EXAM:  XR CHEST PA LAT INDICATION: Cough. Smoker. COMPARISON: None TECHNIQUE: PA and lateral chest views FINDINGS: The cardiomediastinal and hilar contours are within normal limits. The 
pulmonary vasculature is within normal limits. The lungs and pleural spaces are clear. There is no pneumothorax. The visualized 
bones and upper abdomen are age-appropriate. Impression IMPRESSION: 
 
No acute process. RECOMMENDATIONS: 
 
 
Please call me if you have any questions: [phone] Sincerely, Sejal Cuello NP

## 2019-07-09 LAB
ALBUMIN SERPL-MCNC: 3.9 G/DL (ref 3.6–4.8)
ALBUMIN/GLOB SERPL: 1.3 {RATIO} (ref 1.2–2.2)
ALP SERPL-CCNC: 69 IU/L (ref 39–117)
ALT SERPL-CCNC: 20 IU/L (ref 0–32)
AST SERPL-CCNC: 20 IU/L (ref 0–40)
BILIRUB SERPL-MCNC: 0.3 MG/DL (ref 0–1.2)
BUN SERPL-MCNC: 9 MG/DL (ref 8–27)
BUN/CREAT SERPL: 11 (ref 12–28)
CALCIUM SERPL-MCNC: 9.8 MG/DL (ref 8.7–10.3)
CHLORIDE SERPL-SCNC: 102 MMOL/L (ref 96–106)
CO2 SERPL-SCNC: 27 MMOL/L (ref 20–29)
CREAT SERPL-MCNC: 0.82 MG/DL (ref 0.57–1)
GLOBULIN SER CALC-MCNC: 3 G/DL (ref 1.5–4.5)
GLUCOSE SERPL-MCNC: 189 MG/DL (ref 65–99)
POTASSIUM SERPL-SCNC: 5.1 MMOL/L (ref 3.5–5.2)
PROT SERPL-MCNC: 6.9 G/DL (ref 6–8.5)
SODIUM SERPL-SCNC: 142 MMOL/L (ref 134–144)

## 2019-07-13 ENCOUNTER — APPOINTMENT (OUTPATIENT)
Dept: GENERAL RADIOLOGY | Age: 64
End: 2019-07-13
Payer: MEDICAID

## 2019-07-13 ENCOUNTER — HOSPITAL ENCOUNTER (EMERGENCY)
Age: 64
Discharge: HOME | End: 2019-07-13
Attending: EMERGENCY MEDICINE | Admitting: EMERGENCY MEDICINE
Payer: MEDICAID

## 2019-07-13 VITALS
WEIGHT: 200 LBS | BODY MASS INDEX: 34.33 KG/M2 | HEART RATE: 88 BPM | RESPIRATION RATE: 16 BRPM | TEMPERATURE: 98 F | OXYGEN SATURATION: 97 % | SYSTOLIC BLOOD PRESSURE: 115 MMHG | DIASTOLIC BLOOD PRESSURE: 83 MMHG

## 2019-07-13 DIAGNOSIS — F17.200 TOBACCO DEPENDENCE: ICD-10-CM

## 2019-07-13 DIAGNOSIS — M79.7 FIBROMYALGIA: ICD-10-CM

## 2019-07-13 DIAGNOSIS — M50.30 DDD (DEGENERATIVE DISC DISEASE), CERVICAL: ICD-10-CM

## 2019-07-13 DIAGNOSIS — M54.2 NECK PAIN: Primary | ICD-10-CM

## 2019-07-13 LAB
ALBUMIN SERPL-MCNC: 3.6 G/DL (ref 3.5–5)
ALBUMIN/GLOB SERPL: 0.9 {RATIO} (ref 1.1–2.2)
ALP SERPL-CCNC: 78 U/L (ref 45–117)
ALT SERPL-CCNC: 22 U/L (ref 12–78)
ANION GAP SERPL CALC-SCNC: 4 MMOL/L (ref 5–15)
AST SERPL-CCNC: 14 U/L (ref 15–37)
BASOPHILS # BLD: 0.1 K/UL (ref 0–0.1)
BASOPHILS NFR BLD: 1 % (ref 0–1)
BILIRUB SERPL-MCNC: 0.5 MG/DL (ref 0.2–1)
BUN SERPL-MCNC: 12 MG/DL (ref 6–20)
BUN/CREAT SERPL: 13 (ref 12–20)
CALCIUM SERPL-MCNC: 9.3 MG/DL (ref 8.5–10.1)
CHLORIDE SERPL-SCNC: 102 MMOL/L (ref 97–108)
CO2 SERPL-SCNC: 32 MMOL/L (ref 21–32)
CREAT SERPL-MCNC: 0.95 MG/DL (ref 0.55–1.02)
DIFFERENTIAL METHOD BLD: ABNORMAL
EOSINOPHIL # BLD: 0.2 K/UL (ref 0–0.4)
EOSINOPHIL NFR BLD: 3 % (ref 0–7)
ERYTHROCYTE [DISTWIDTH] IN BLOOD BY AUTOMATED COUNT: 13.3 % (ref 11.5–14.5)
GLOBULIN SER CALC-MCNC: 4.1 G/DL (ref 2–4)
GLUCOSE SERPL-MCNC: 223 MG/DL (ref 65–100)
HCT VFR BLD AUTO: 41.4 % (ref 35–47)
HGB BLD-MCNC: 13.8 G/DL (ref 11.5–16)
IMM GRANULOCYTES # BLD AUTO: 0.1 K/UL (ref 0–0.04)
IMM GRANULOCYTES NFR BLD AUTO: 1 % (ref 0–0.5)
LYMPHOCYTES # BLD: 3.1 K/UL (ref 0.8–3.5)
LYMPHOCYTES NFR BLD: 35 % (ref 12–49)
MCH RBC QN AUTO: 29.2 PG (ref 26–34)
MCHC RBC AUTO-ENTMCNC: 33.3 G/DL (ref 30–36.5)
MCV RBC AUTO: 87.5 FL (ref 80–99)
MONOCYTES # BLD: 0.7 K/UL (ref 0–1)
MONOCYTES NFR BLD: 8 % (ref 5–13)
NEUTS SEG # BLD: 4.8 K/UL (ref 1.8–8)
NEUTS SEG NFR BLD: 52 % (ref 32–75)
NRBC # BLD: 0 K/UL (ref 0–0.01)
NRBC BLD-RTO: 0 PER 100 WBC
PLATELET # BLD AUTO: 286 K/UL (ref 150–400)
PMV BLD AUTO: 10.2 FL (ref 8.9–12.9)
POTASSIUM SERPL-SCNC: 4.6 MMOL/L (ref 3.5–5.1)
PROT SERPL-MCNC: 7.7 G/DL (ref 6.4–8.2)
RBC # BLD AUTO: 4.73 M/UL (ref 3.8–5.2)
SODIUM SERPL-SCNC: 138 MMOL/L (ref 136–145)
TROPONIN I SERPL-MCNC: <0.05 NG/ML
WBC # BLD AUTO: 8.9 K/UL (ref 3.6–11)

## 2019-07-13 PROCEDURE — 84484 ASSAY OF TROPONIN QUANT: CPT

## 2019-07-13 PROCEDURE — 99283 EMERGENCY DEPT VISIT LOW MDM: CPT

## 2019-07-13 PROCEDURE — 93005 ELECTROCARDIOGRAM TRACING: CPT

## 2019-07-13 PROCEDURE — 80053 COMPREHEN METABOLIC PANEL: CPT

## 2019-07-13 PROCEDURE — 72050 X-RAY EXAM NECK SPINE 4/5VWS: CPT

## 2019-07-13 PROCEDURE — 74011250637 HC RX REV CODE- 250/637: Performed by: PHYSICIAN ASSISTANT

## 2019-07-13 PROCEDURE — 36415 COLL VENOUS BLD VENIPUNCTURE: CPT

## 2019-07-13 PROCEDURE — 85025 COMPLETE CBC W/AUTO DIFF WBC: CPT

## 2019-07-13 PROCEDURE — 96374 THER/PROPH/DIAG INJ IV PUSH: CPT

## 2019-07-13 PROCEDURE — 96375 TX/PRO/DX INJ NEW DRUG ADDON: CPT

## 2019-07-13 PROCEDURE — 74011250636 HC RX REV CODE- 250/636: Performed by: PHYSICIAN ASSISTANT

## 2019-07-13 RX ORDER — ONDANSETRON 2 MG/ML
4 INJECTION INTRAMUSCULAR; INTRAVENOUS
Status: COMPLETED | OUTPATIENT
Start: 2019-07-13 | End: 2019-07-13

## 2019-07-13 RX ORDER — METHOCARBAMOL 750 MG/1
1500 TABLET, FILM COATED ORAL 3 TIMES DAILY
Qty: 30 TAB | Refills: 0 | Status: SHIPPED | OUTPATIENT
Start: 2019-07-13 | End: 2019-07-18

## 2019-07-13 RX ORDER — DIAZEPAM 5 MG/1
5 TABLET ORAL
Status: COMPLETED | OUTPATIENT
Start: 2019-07-13 | End: 2019-07-13

## 2019-07-13 RX ORDER — TRAMADOL HYDROCHLORIDE 50 MG/1
50 TABLET ORAL
Qty: 10 TAB | Refills: 0 | Status: SHIPPED | OUTPATIENT
Start: 2019-07-13 | End: 2019-07-18

## 2019-07-13 RX ORDER — MELOXICAM 15 MG/1
15 TABLET ORAL DAILY
Qty: 10 TAB | Refills: 0 | Status: SHIPPED | OUTPATIENT
Start: 2019-07-13 | End: 2019-07-23

## 2019-07-13 RX ORDER — MORPHINE SULFATE 2 MG/ML
2 INJECTION, SOLUTION INTRAMUSCULAR; INTRAVENOUS
Status: COMPLETED | OUTPATIENT
Start: 2019-07-13 | End: 2019-07-13

## 2019-07-13 RX ADMIN — ONDANSETRON 4 MG: 2 INJECTION INTRAMUSCULAR; INTRAVENOUS at 12:25

## 2019-07-13 RX ADMIN — DIAZEPAM 5 MG: 5 TABLET ORAL at 12:25

## 2019-07-13 RX ADMIN — MORPHINE SULFATE 2 MG: 2 INJECTION, SOLUTION INTRAMUSCULAR; INTRAVENOUS at 12:25

## 2019-07-13 NOTE — ED NOTES
Pt discharged by [place], [place]. Pt provided with discharge instructions Rx and instructions on follow up care. Pt out of ED in stable condition accompanied by son.

## 2019-07-14 LAB
ATRIAL RATE: 79 BPM
CALCULATED P AXIS, ECG09: 34 DEGREES
CALCULATED R AXIS, ECG10: 5 DEGREES
CALCULATED T AXIS, ECG11: 20 DEGREES
DIAGNOSIS, 93000: NORMAL
P-R INTERVAL, ECG05: 158 MS
Q-T INTERVAL, ECG07: 386 MS
QRS DURATION, ECG06: 72 MS
QTC CALCULATION (BEZET), ECG08: 442 MS
VENTRICULAR RATE, ECG03: 79 BPM

## 2019-07-14 NOTE — ED PROVIDER NOTES
EMERGENCY DEPARTMENT HISTORY AND PHYSICAL EXAM      Date: 7/13/2019  Patient Name: Andi Lynn    History of Presenting Illness     Chief Complaint   Patient presents with    Neck Pain     R sided neck pain x 6 days, denies injury       History Provided By: Patient and Patient's Son    HPI: Andi Lynn, 59 y.o. female presents ambulatory to the Emergency Dept with her son reporting the patient with 6 day h/o L sided neck pain. She denied h/o chronic neck pain. No known injury/strain. No fever/chills. She denied headache or confusion. No chest pain or shortness of breath. No rash/lesion. Increased pain noted with movement. No numbness/tingling or weakness. Pt is o/w healthy without cough, congestion, ST, N/V/D. Chief Complaint: neck pain  Duration: 6 Days  Timing:  Acute  Location: L neck  Quality: Aching  Severity: Severe  Modifying Factors: increased pain with movement  Associated Symptoms: denies any other associated signs or symptoms        There are no other complaints, changes, or physical findings at this time. PCP: Brigitte Sage., NP    Current Outpatient Medications   Medication Sig Dispense Refill    meloxicam (MOBIC) 15 mg tablet Take 1 Tab by mouth daily for 10 days. 10 Tab 0    methocarbamol (ROBAXIN) 750 mg tablet Take 2 Tabs by mouth three (3) times daily for 5 days. 30 Tab 0    traMADol (ULTRAM) 50 mg tablet Take 1 Tab by mouth every eight (8) hours as needed for Pain for up to 5 days. Max Daily Amount: 150 mg. 10 Tab 0    gabapentin (NEURONTIN) 100 mg capsule Take 2 Caps by mouth three (3) times daily. 180 Cap 3    albuterol (PROVENTIL HFA, VENTOLIN HFA, PROAIR HFA) 90 mcg/actuation inhaler Take 1 Puff by inhalation every six (6) hours as needed for Wheezing. 1 Inhaler 1    buPROPion SR (WELLBUTRIN SR) 150 mg SR tablet Take 1 Tab by mouth two (2) times a day.  Take 1 tab daily x 1 week then increase to twice daily- for depression & smoking 180 Tab 0    cholecalciferol, VITAMIN D3, (VITAMIN D3) 5,000 unit tab tablet Take 1 Tab by mouth daily. 90 Tab 0    fluticasone propion-salmeterol (ADVAIR/WIXELA) 100-50 mcg/dose diskus inhaler Take 1 Puff by inhalation two (2) times a day. 1 Inhaler 0    FREESTYLE LANCETS 28 gauge misc TESTING 3 TIMES A DAY. E11.65 100 Lancet 2    glucose blood VI test strips (FREESTYLE INSULINX) strip Check sugar 3 times a day 300 Strip 4    insulin aspart protamine/insulin aspart (NOVOLOG MIX 70-30FLEXPEN U-100) 100 unit/mL (70-30) inpn 40 units at noon (first meal) and 40 units at 7 pm (second meal) 10 Pen 6    Insulin Needles, Disposable, ([employer] ULTRA-FINE MINI PEN NEEDLE) 31 gauge x 3/16\" ndle Use to inject insulin twice daily 200 Pen Needle 3    lancets 32 gauge misc 1 Each by Does Not Apply route three (3) times daily. 300 Lancet 3    lisinopril (PRINIVIL, ZESTRIL) 5 mg tablet Take 1 Tab by mouth daily. 90 Tab 0    metFORMIN (GLUCOPHAGE) 1,000 mg tablet Take 1 Tab by mouth two (2) times daily (with meals). 180 Tab 0    Omeprazole delayed release (PRILOSEC D/R) 20 mg tablet Take 1 Tab by mouth daily as needed (acid reflux). 90 Tab 0    pravastatin (PRAVACHOL) 20 mg tablet Take 1 Tab by mouth nightly. 90 Tab 0    Blood-Glucose Meter (FREESTYLE FLASH SYSTEM) monitoring kit Test 3 Times Daily   DX E11.40,  E11.65 1 Kit 0       Past History     Past Medical History:  Past Medical History:   Diagnosis Date    Arthritis     shoulders and knees.     Diabetes ([address]     insulin dependent     Diabetes ([address]     Hypercholesterolemia     Hypertension        Past Surgical History:  Past Surgical History:   Procedure Laterality Date    ABDOMEN SURGERY PROC UNLISTED          COLONOSCOPY N/A 2019    COL[facility] Saber[facility] END[facility]COPY    HX COLONOSCOPY      HX GYN      hysterectomy    HX GYN      hysterectomy, total for fibroid       Family History:  Family History   Problem Relation Age of Onset  Diabetes Mother     Diabetes Father     Diabetes Sister     Diabetes Brother        Social History:  Social History     Tobacco Use    Smoking status: Current Every Day Smoker     Packs/day: 2.00     Years: 41.00     Pack years: 82.00     Types: Cigarettes    Smokeless tobacco: Never Used   Substance Use Topics    Alcohol use: No     Frequency: Never     Binge frequency: Never    Drug use: No       Allergies:  No Known Allergies      Review of Systems   Review of Systems   Constitutional: Negative for chills and fever. HENT: Negative for congestion, rhinorrhea and sore throat. Respiratory: Negative for cough and shortness of breath. Cardiovascular: Negative for chest pain and palpitations. Gastrointestinal: Negative for diarrhea, nausea and vomiting. Endocrine: Negative for polydipsia, polyphagia and polyuria. Genitourinary: Negative for dysuria and hematuria. Musculoskeletal: Positive for neck pain. Negative for neck stiffness. Skin: Negative for rash and wound. Allergic/Immunologic: Negative for food allergies and immunocompromised state. Neurological: Negative for dizziness and headaches. Hematological: Negative for adenopathy. Does not bruise/bleed easily. Psychiatric/Behavioral: Negative for agitation and confusion. Physical Exam   Physical Exam   Constitutional: She is oriented to person, place, and time. She appears well-developed and well-nourished. No distress. Obese elderly Persian female, alert, in moderate discomfort   HENT:   Head: Normocephalic and atraumatic. Nose: Nose normal.   Mouth/Throat: Oropharynx is clear and moist. No oropharyngeal exudate. Eyes: Pupils are equal, round, and reactive to light. Conjunctivae and EOM are normal. Right eye exhibits no discharge. Left eye exhibits no discharge. No scleral icterus. Neck: Neck supple. No JVD present. No tracheal deviation present. No thyromegaly present.    Marked tenderness with palpation/movement L paracervical region, palpable spasm noted, no midline cervical spinal tenderness, 2+ radial pulses. NVI. Sensation grossly intact to light touch. Cardiovascular: Normal rate, regular rhythm and normal heart sounds. Pulmonary/Chest: Effort normal and breath sounds normal. No respiratory distress. She has no wheezes. She exhibits no tenderness. Abdominal: Soft. There is no tenderness. Musculoskeletal: Normal range of motion. She exhibits no edema. Lymphadenopathy:     She has no cervical adenopathy. Neurological: She is alert and oriented to person, place, and time. She exhibits normal muscle tone. Coordination normal.   Skin: Skin is warm and dry. No rash noted. She is not diaphoretic. No erythema. Psychiatric: She has a normal mood and affect. Her behavior is normal. Judgment normal.   Nursing note and vitals reviewed.       Diagnostic Study Results     Labs -     Recent Results (from the past 12 hour(s))   EKG, 12 LEAD, INITIAL    Collection Time: 07/13/19 12:01 PM   Result Value Ref Range    Ventricular Rate 79 BPM    Atrial Rate 79 BPM    P-R Interval 158 ms    QRS Duration 72 ms    Q-T Interval 386 ms    QTC Calculation (Bezet) 442 ms    Calculated P Axis 34 degrees    Calculated R Axis 5 degrees    Calculated T Axis 20 degrees    Diagnosis       Normal sinus rhythm  Possible Inferior infarct , age undetermined  No previous ECGs available     CBC WITH AUTOMATED DIFF    Collection Time: 07/13/19 12:25 PM   Result Value Ref Range    WBC 8.9 3.6 - 11.0 K/uL    RBC 4.73 3.80 - 5.20 M/uL    HGB 13.8 11.5 - 16.0 g/dL    HCT 41.4 35.0 - 47.0 %    MCV 87.5 80.0 - 99.0 FL    MCH 29.2 26.0 - 34.0 PG    MCHC 33.3 30.0 - 36.5 g/dL    RDW 13.3 11.5 - 14.5 %    PLATELET 256 279 - 958 K/uL    MPV 10.2 8.9 - 12.9 FL    NRBC 0.0 0  WBC    ABSOLUTE NRBC 0.00 0.00 - 0.01 K/uL    NEUTROPHILS 52 32 - 75 %    LYMPHOCYTES 35 12 - 49 %    MONOCYTES 8 5 - 13 %    EOSINOPHILS 3 0 - 7 %    BASOPHILS 1 0 - 1 % IMMATURE GRANULOCYTES 1 (H) 0.0 - 0.5 %    ABS. NEUTROPHILS 4.8 1.8 - 8.0 K/UL    ABS. LYMPHOCYTES 3.1 0.8 - 3.5 K/UL    ABS. MONOCYTES 0.7 0.0 - 1.0 K/UL    ABS. EOSINOPHILS 0.2 0.0 - 0.4 K/UL    ABS. BASOPHILS 0.1 0.0 - 0.1 K/UL    ABS. IMM. GRANS. 0.1 (H) 0.00 - 0.04 K/UL    DF AUTOMATED     METABOLIC PANEL, COMPREHENSIVE    Collection Time: 07/13/19 12:25 PM   Result Value Ref Range    Sodium 138 136 - 145 mmol/L    Potassium 4.6 3.5 - 5.1 mmol/L    Chloride 102 97 - 108 mmol/L    CO2 32 21 - 32 mmol/L    Anion gap 4 (L) 5 - 15 mmol/L    Glucose 223 (H) 65 - 100 mg/dL    BUN 12 6 - 20 MG/DL    Creatinine 0.95 0.55 - 1.02 MG/DL    BUN/Creatinine ratio 13 12 - 20      GFR est AA >60 >60 ml/min/1.73m2    GFR est non-AA 59 (L) >60 ml/min/1.73m2    Calcium 9.3 8.5 - 10.1 MG/DL    Bilirubin, total 0.5 0.2 - 1.0 MG/DL    ALT (SGPT) 22 12 - 78 U/L    AST (SGOT) 14 (L) 15 - 37 U/L    Alk. phosphatase 78 45 - 117 U/L    Protein, total 7.7 6.4 - 8.2 g/dL    Albumin 3.6 3.5 - 5.0 g/dL    Globulin 4.1 (H) 2.0 - 4.0 g/dL    A-G Ratio 0.9 (L) 1.1 - 2.2     TROPONIN I    Collection Time: 07/13/19 12:25 PM   Result Value Ref Range    Troponin-I, Qt. <0.05 <0.05 ng/mL       Radiologic Studies -   XR SPINE CERV 4 OR 5 V   Final Result   IMPRESSION: Degenerative disc disease with right neural foraminal narrowing. Medical Decision Making   I am the first provider for this patient. I reviewed the vital signs, available nursing notes, past medical history, past surgical history, family history and social history. Vital Signs-Reviewed the patient's vital signs. Patient Vitals for the past 12 hrs:   Temp Pulse Resp BP SpO2   07/13/19 0928 98 °F (36.7 °C) 88 16 115/83 97 %         Records Reviewed: Nursing Notes, Old Medical Records, Previous Radiology Studies and Previous Laboratory Studies    Provider Notes (Medical Decision Making):   Strain, spasm, DDD, fx    ED Course:   Initial assessment performed.  The patients presenting problems have been discussed, and they are in agreement with the care plan formulated and outlined with them. I have encouraged them to ask questions as they arise throughout their visit. Pt improved after medication. Pt discharged with use of translater phone. TOBACCO CESSATION COUNSELING  The patient was counseled on the dangers of tobacco use, and was advised to quit. Reviewed strategies to maximize success, including removing cigarettes and smoking materials from environment, stress management, substitution of other forms of reinforcement, support of family/friends and written materials. DISCHARGE NOTE:  The care plan has been outline with the patient and/or family, who verbally conveyed understanding and agreement. Available results have been reviewed. Patient and/or family understand the follow up plan as outlined and discharge instructions. Should their condition deterioration at any time after discharge the patient agrees to return, follow up sooner than outlined or seek medical assistance at the closest Emergency Room as soon as possible. Questions have been answered. Discharge instructions and educational information regarding the patient's diagnosis as well a list of reasons why the patient would want to seek immediate medical attention, should their condition change, were reviewed directly with the patient/family       PLAN:  1. Discharge Medication List as of 7/13/2019  2:15 PM      START taking these medications    Details   meloxicam (MOBIC) 15 mg tablet Take 1 Tab by mouth daily for 10 days. , Print, Disp-10 Tab, R-0      methocarbamol (ROBAXIN) 750 mg tablet Take 2 Tabs by mouth three (3) times daily for 5 days. , Print, Disp-30 Tab, R-0      traMADol (ULTRAM) 50 mg tablet Take 1 Tab by mouth every eight (8) hours as needed for Pain for up to 5 days.  Max Daily Amount: 150 mg., Print, Disp-10 Tab, R-0         CONTINUE these medications which have NOT CHANGED    Details gabapentin (NEURONTIN) 100 mg capsule Take 2 Caps by mouth three (3) times daily. , Print, Disp-180 Cap, R-3      albuterol (PROVENTIL HFA, VENTOLIN HFA, PROAIR HFA) 90 mcg/actuation inhaler Take 1 Puff by inhalation every six (6) hours as needed for Wheezing., Print, Disp-1 Inhaler, R-1      buPROPion SR (WELLBUTRIN SR) 150 mg SR tablet Take 1 Tab by mouth two (2) times a day. Take 1 tab daily x 1 week then increase to twice daily- for depression & smoking, Print, Disp-180 Tab, R-0      cholecalciferol, VITAMIN D3, (VITAMIN D3) 5,000 unit tab tablet Take 1 Tab by mouth daily. , Print, Disp-90 Tab, R-0      fluticasone propion-salmeterol (ADVAIR/WIXELA) 100-50 mcg/dose diskus inhaler Take 1 Puff by inhalation two (2) times a day., Print, Disp-1 Inhaler, R-0      !! FREESTYLE LANCETS 28 gauge misc TESTING 3 TIMES A DAY. E11.65, Print, Disp-100 Lancet, R-2, KEVIN      glucose blood VI test strips (FREESTYLE INSULINX) strip Check sugar 3 times a day, Print, Disp-300 Strip, R-4      insulin aspart protamine/insulin aspart (NOVOLOG MIX 70-30FLEXPEN U-100) 100 unit/mL (70-30) inpn 40 units at noon (first meal) and 40 units at 7 pm (second meal), Print, Disp-10 Pen, R-6      Insulin Needles, Disposable, ([employer] ULTRA-FINE MINI PEN NEEDLE) 31 gauge x 3/16\" ndle Use to inject insulin twice daily, Print, Disp-200 Pen Needle, R-3      !! lancets 32 gauge misc 1 Each by Does Not Apply route three (3) times daily. , Print, Disp-300 Lancet, R-3      lisinopril (PRINIVIL, ZESTRIL) 5 mg tablet Take 1 Tab by mouth daily. , Print, Disp-90 Tab, R-0      metFORMIN (GLUCOPHAGE) 1,000 mg tablet Take 1 Tab by mouth two (2) times daily (with meals). , Print, Disp-180 Tab, R-0      Omeprazole delayed release (PRILOSEC D/R) 20 mg tablet Take 1 Tab by mouth daily as needed (acid reflux). , Print, Disp-90 Tab, R-0      pravastatin (PRAVACHOL) 20 mg tablet Take 1 Tab by mouth nightly. , Print, Disp-90 Tab, R-0      Blood-Glucose Meter (FREESTYLE FLASH SYSTEM) monitoring kit Test 3 Times Daily   DX E11.40,  E11.65, Normal, Disp-1 Kit, R-0       !! - Potential duplicate medications found. Please discuss with provider. 2.   Follow-up Information     Follow up With Specialties Details Why Contact Info    Jaylyn Munguia MD Orthopedic Surgery  As needed [address]  [facility]  [phone]      [facility] EMERGENCY DEPT Emergency Medicine  If symptoms worsen [address] [place] [address]  [phone]        Return to ED if worse     Diagnosis     Clinical Impression:   1. Neck pain    2. DDD (degenerative disc disease), cervical    3. Tobacco dependence    4.  Fibromyalgia

## 2019-08-08 ENCOUNTER — OFFICE VISIT (OUTPATIENT)
Dept: FAMILY MEDICINE CLINIC | Age: 64
End: 2019-08-08

## 2019-08-08 VITALS
RESPIRATION RATE: 20 BRPM | TEMPERATURE: 97.5 F | OXYGEN SATURATION: 97 % | DIASTOLIC BLOOD PRESSURE: 67 MMHG | SYSTOLIC BLOOD PRESSURE: 125 MMHG | WEIGHT: 198 LBS | HEIGHT: 64 IN | HEART RATE: 79 BPM | BODY MASS INDEX: 33.8 KG/M2

## 2019-08-08 DIAGNOSIS — M54.2 NECK PAIN: ICD-10-CM

## 2019-08-08 DIAGNOSIS — E78.2 MIXED HYPERCHOLESTEROLEMIA AND HYPERTRIGLYCERIDEMIA: ICD-10-CM

## 2019-08-08 DIAGNOSIS — E55.9 VITAMIN D DEFICIENCY: ICD-10-CM

## 2019-08-08 DIAGNOSIS — Z01.00 DIABETIC EYE EXAM (HCC): ICD-10-CM

## 2019-08-08 DIAGNOSIS — Z23 ENCOUNTER FOR IMMUNIZATION: ICD-10-CM

## 2019-08-08 DIAGNOSIS — I10 HYPERTENSION, WELL CONTROLLED: ICD-10-CM

## 2019-08-08 DIAGNOSIS — E11.9 DIABETIC EYE EXAM (HCC): ICD-10-CM

## 2019-08-08 DIAGNOSIS — R35.0 FREQUENCY OF URINATION: ICD-10-CM

## 2019-08-08 RX ORDER — MELOXICAM 15 MG/1
15 TABLET ORAL DAILY
Qty: 20 TAB | Refills: 1 | Status: SHIPPED | OUTPATIENT
Start: 2019-08-08 | End: 2021-08-26 | Stop reason: SDUPTHER

## 2019-08-08 RX ORDER — MELOXICAM 15 MG/1
15 TABLET ORAL DAILY
COMMUNITY
End: 2019-08-08 | Stop reason: SDUPTHER

## 2019-08-08 RX ORDER — LISINOPRIL 5 MG/1
5 TABLET ORAL DAILY
Qty: 90 TAB | Refills: 1 | Status: SHIPPED | OUTPATIENT
Start: 2019-08-08 | End: 2021-08-26 | Stop reason: SDUPTHER

## 2019-08-08 RX ORDER — CYCLOBENZAPRINE HCL 10 MG
TABLET ORAL
COMMUNITY
End: 2019-08-08

## 2019-08-08 RX ORDER — METHOCARBAMOL 750 MG/1
750 TABLET, FILM COATED ORAL 4 TIMES DAILY
Qty: 30 TAB | Refills: 0 | Status: SHIPPED | OUTPATIENT
Start: 2019-08-08 | End: 2020-10-23 | Stop reason: SDUPTHER

## 2019-08-08 RX ORDER — METHOCARBAMOL 750 MG/1
TABLET, FILM COATED ORAL 4 TIMES DAILY
COMMUNITY
End: 2019-08-08 | Stop reason: SDUPTHER

## 2019-08-08 NOTE — PROGRESS NOTES
Chief Complaint   Patient presents with    Neck Pain     left side of neck     HPI:  Andi Lynn is a 59 y.o. female with h/o diabetes type 2, hypertension, hypercholesterolemia and diabetic neuropathy presents accompanied by family members to follow up. Patient has no been seen in this clinic for about a year. She now follows a NP [place]. Patient presents with c/o neck pain radiating down both shoulders and arms. She was seen in the ER with symptoms. The cervical XR showed gegenerative disc disease with right neural foraminal narrowing. Patient was was referred to orthopedics  Blood pressure is at goal. a1c of 8.6% showed moderately controlled diabetes. She was treated with muscle relaxant and NSAID. Review of Systems  As per hpi    Past Medical History:   Diagnosis Date    Arthritis     shoulders and knees.     Diabetes ([address]     insulin dependent     Diabetes ([address]     Hypercholesterolemia     Hypertension      Past Surgical History:   Procedure Laterality Date    ABDOMEN SURGERY PROC UNLISTED          COLONOSCOPY N/A 2019    COLONOSCOPY performed by Abdirahman Toscano [facility] at [facility] ENDOSCOPY    HX COLONOSCOPY      HX GYN      hysterectomy    HX GYN      hysterectomy, total for fibroid     Social History     Socioeconomic History    Marital status:      Spouse name: Not on file    Number of children: Not on file    Years of education: Not on file    Highest education level: Not on file   Tobacco Use    Smoking status: Current Every Day Smoker     Packs/day: 2.00     Years: 41.00     Pack years: 82.00     Types: Cigarettes    Smokeless tobacco: Never Used   Substance and Sexual Activity    Alcohol use: No     Frequency: Never     Binge frequency: Never    Drug use: No    Sexual activity: Never   Social History Narrative    ** Merged History Encounter **          Family History   Problem Relation Age of Onset    Diabetes Mother     Diabetes Father     Diabetes Sister     Diabetes Brother      Current Outpatient Medications   Medication Sig Dispense Refill    meloxicam (MOBIC) 15 mg tablet Take 15 mg by mouth daily.  methocarbamol (ROBAXIN) 750 mg tablet Take  by mouth four (4) times daily.  cyclobenzaprine (FLEXERIL) 10 mg tablet Take  by mouth three (3) times daily as needed for Muscle Spasm(s).  gabapentin (NEURONTIN) 100 mg capsule Take 2 Caps by mouth three (3) times daily. 180 Cap 3    albuterol (PROVENTIL HFA, VENTOLIN HFA, PROAIR HFA) 90 mcg/actuation inhaler Take 1 Puff by inhalation every six (6) hours as needed for Wheezing. 1 Inhaler 1    buPROPion SR (WELLBUTRIN SR) 150 mg SR tablet Take 1 Tab by mouth two (2) times a day. Take 1 tab daily x 1 week then increase to twice daily- for depression & smoking 180 Tab 0    cholecalciferol, VITAMIN D3, (VITAMIN D3) 5,000 unit tab tablet Take 1 Tab by mouth daily. 90 Tab 0    fluticasone propion-salmeterol (ADVAIR/WIXELA) 100-50 mcg/dose diskus inhaler Take 1 Puff by inhalation two (2) times a day. 1 Inhaler 0    FREESTYLE LANCETS 28 gauge misc TESTING 3 TIMES A DAY. E11.65 100 Lancet 2    glucose blood VI test strips (FREESTYLE INSULINX) strip Check sugar 3 times a day 300 Strip 4    insulin aspart protamine/insulin aspart (NOVOLOG MIX 70-30FLEXPEN U-100) 100 unit/mL (70-30) inpn 40 units at noon (first meal) and 40 units at 7 pm (second meal) 10 Pen 6    Insulin Needles, Disposable, (BD ULTRA-FINE MINI PEN NEEDLE) 31 gauge x 3/16\" ndle Use to inject insulin twice daily 200 Pen Needle 3    lancets 32 gauge misc 1 Each by Does Not Apply route three (3) times daily. 300 Lancet 3    lisinopril (PRINIVIL, ZESTRIL) 5 mg tablet Take 1 Tab by mouth daily. 90 Tab 0    metFORMIN (GLUCOPHAGE) 1,000 mg tablet Take 1 Tab by mouth two (2) times daily (with meals). 180 Tab 0    Omeprazole delayed release (PRILOSEC D/R) 20 mg tablet Take 1 Tab by mouth daily as needed (acid reflux).  90 Tab 0    pravastatin (PRAVACHOL) 20 mg tablet Take 1 Tab by mouth nightly. 90 Tab 0    Blood-Glucose Meter (FREESTYLE FLASH SYSTEM) monitoring kit Test 3 Times Daily   DX E11.40,  E11.65 1 Kit 0     No Known Allergies    Objective:  Visit Vitals  /67   Pulse 79   Temp 97.5 °F (36.4 °C) (Oral)   Resp 20   Ht 5' 4\" (1.626 m)   Wt 198 lb (89.8 kg)   LMP  (LMP Unknown)   SpO2 97%   BMI 33.99 kg/m²     Physical Exam:   General appearance - alert, well appearing in no distress  Mental status - alert, oriented to person, place, and time  Neck - supple, no significant adenopathy   Chest - clear to auscultation, no wheezes, rales or rhonchi  Heart - normal rate, regular rhythm, normal blood pressure  Abdomen - soft, nontender, nondistended, no organomegaly  Ext-peripheral pulses normal, no pedal edema  Neuro -alert, oriented, normal speech, no focal findings   Back-full range of motion, no tenderness, palpable spasm or pain on motion     Assessment/Plan:  Diagnoses and all orders for this visit:    DM type 2, uncontrolled, with neuropathy (HCC)  -     MICROALBUMIN, UR, RAND W/ MICROALB/CREAT RATIO    Mixed hypercholesterolemia and hypertriglyceridemia  -     LIPID PANEL    Frequency of urination  -     URINALYSIS W/ RFLX MICROSCOPIC    Hypertension, well controlled  -     lisinopril (PRINIVIL, ZESTRIL) 5 mg tablet; Take 1 Tab by mouth daily. , Normal, Disp-90 Tab, R-1    Neck pain  -     meloxicam (MOBIC) 15 mg tablet; Take 1 Tab by mouth daily. , Normal, Disp-20 Tab, R-1  -     methocarbamol (ROBAXIN) 750 mg tablet; Take 1 Tab by mouth four (4) times daily. , Normal, Disp-30 Tab, R-0  -     REFERRAL TO ORTHOPEDIC SURGERY    Encounter for immunization  -     varicella-zoster recombinant, PF, (SHINGRIX, PF,) 50 mcg/0.5 mL susr injection; 0.5 mL by IntraMUSCular route once for 1 dose., Print, Disp-0.5 mL, R-0    Vitamin D deficiency  -     VITAMIN D, 25 HYDROXY    Diabetic eye exam ([facility] [address]  -     REFERRAL TO OPHTHALMOLOGY      Patient Instructions          Neck Pain: Care Instructions  Your Care Instructions    You can have neck pain anywhere from the bottom of your head to the top of your shoulders. It can spread to the upper back or arms. Injuries, painting a ceiling, sleeping with your neck twisted, staying in one position for too long, and many other activities can cause neck pain. Most neck pain gets better with home care. Your doctor may recommend medicine to relieve pain or relax your muscles. He or she may suggest exercise and physical therapy to increase flexibility and relieve stress. You may need to wear a special (cervical) collar to support your neck for a day or two. Follow-up care is a key part of your treatment and safety. Be sure to make and go to all appointments, and call your doctor if you are having problems. It's also a good idea to know your test results and keep a list of the medicines you take. How can you care for yourself at home? · Try using a heating pad on a low or medium setting for 15 to 20 minutes every 2 or 3 hours. Try a warm shower in place of one session with the heating pad. · You can also try an ice pack for 10 to 15 minutes every 2 to 3 hours. Put a thin cloth between the ice and your skin. · Take pain medicines exactly as directed. ? If the doctor gave you a prescription medicine for pain, take it as prescribed. ? If you are not taking a prescription pain medicine, ask your doctor if you can take an over-the-counter medicine. · If your doctor recommends a cervical collar, wear it exactly as directed. When should you call for help? Call your doctor now or seek immediate medical care if:    · You have new or worsening numbness in your arms, buttocks or legs.     · You have new or worsening weakness in your arms or legs.  (This could make it hard to stand up.)     · You lose control of your bladder or bowels.    Watch closely for changes in your health, and be sure to contact your doctor if:    · Your neck pain is getting worse.     · You are not getting better after 1 week.     · You do not get better as expected. Where can you learn more? Go to http://alley-serg.info/. Enter [id number] in the search box to learn more about \"Neck Pain: Care Instructions. \"  Current as of: September 20, 2018  Content Version: 12.1  © 1776-5191 [employer]. Care instructions adapted under license by [employer] (which disclaims liability or warranty for this information). If you have questions about a medical condition or this instruction, always ask your healthcare professional. [place] [address] any warranty or liability for your use of this information. Follow-up and Dispositions    · Return in about 1 month (around 9/5/2019), or if symptoms worsen or fail to improve, for routine follow up.

## 2019-08-08 NOTE — PATIENT INSTRUCTIONS
Neck Pain: Care Instructions  Your Care Instructions    You can have neck pain anywhere from the bottom of your head to the top of your shoulders. It can spread to the upper back or arms. Injuries, painting a ceiling, sleeping with your neck twisted, staying in one position for too long, and many other activities can cause neck pain. Most neck pain gets better with home care. Your doctor may recommend medicine to relieve pain or relax your muscles. He or she may suggest exercise and physical therapy to increase flexibility and relieve stress. You may need to wear a special (cervical) collar to support your neck for a day or two. Follow-up care is a key part of your treatment and safety. Be sure to make and go to all appointments, and call your doctor if you are having problems. It's also a good idea to know your test results and keep a list of the medicines you take. How can you care for yourself at home? · Try using a heating pad on a low or medium setting for 15 to 20 minutes every 2 or 3 hours. Try a warm shower in place of one session with the heating pad. · You can also try an ice pack for 10 to 15 minutes every 2 to 3 hours. Put a thin cloth between the ice and your skin. · Take pain medicines exactly as directed. ? If the doctor gave you a prescription medicine for pain, take it as prescribed. ? If you are not taking a prescription pain medicine, ask your doctor if you can take an over-the-counter medicine. · If your doctor recommends a cervical collar, wear it exactly as directed. When should you call for help? Call your doctor now or seek immediate medical care if:    · You have new or worsening numbness in your arms, buttocks or legs.     · You have new or worsening weakness in your arms or legs.  (This could make it hard to stand up.)     · You lose control of your bladder or bowels.    Watch closely for changes in your health, and be sure to contact your doctor if:    · Your neck pain is getting worse.     · You are not getting better after 1 week.     · You do not get better as expected. Where can you learn more? Go to http://alley-serg.info/. Enter [id number] in the search box to learn more about \"Neck Pain: Care Instructions. \"  Current as of: September 20, 2018  Content Version: 12.1  © 3114-0733 [employer]. Care instructions adapted under license by [employer] (which disclaims liability or warranty for this information). If you have questions about a medical condition or this instruction, always ask your healthcare professional. [place] [address] any warranty or liability for your use of this information.

## 2019-08-09 LAB
25(OH)D3+25(OH)D2 SERPL-MCNC: 21.1 NG/ML (ref 30–100)
ALBUMIN/CREAT UR: 34.9 MG/G CREAT (ref 0–30)
APPEARANCE UR: CLEAR
BILIRUB UR QL STRIP: NEGATIVE
CHOLEST SERPL-MCNC: 181 MG/DL (ref 100–199)
COLOR UR: YELLOW
CREAT UR-MCNC: 94.9 MG/DL
GLUCOSE UR QL: ABNORMAL
HDLC SERPL-MCNC: 43 MG/DL
HGB UR QL STRIP: NEGATIVE
KETONES UR QL STRIP: NEGATIVE
LDLC SERPL CALC-MCNC: 102 MG/DL (ref 0–99)
LEUKOCYTE ESTERASE UR QL STRIP: NEGATIVE
MICRO URNS: ABNORMAL
MICROALBUMIN UR-MCNC: 33.1 UG/ML
NITRITE UR QL STRIP: NEGATIVE
PH UR STRIP: 5.5 [PH] (ref 5–7.5)
PROT UR QL STRIP: NEGATIVE
SP GR UR: 1.02 (ref 1–1.03)
TRIGL SERPL-MCNC: 181 MG/DL (ref 0–149)
UROBILINOGEN UR STRIP-MCNC: 0.2 MG/DL (ref 0.2–1)
VLDLC SERPL CALC-MCNC: 36 MG/DL (ref 5–40)

## 2019-09-13 ENCOUNTER — TELEPHONE (OUTPATIENT)
Dept: FAMILY MEDICINE CLINIC | Age: 64
End: 2019-09-13

## 2019-09-13 NOTE — TELEPHONE ENCOUNTER
----- Message from Yoselin Robin sent at 9/13/2019  9:17 AM EDT -----  Regarding: Dr. Lacy Rosales General Message/Vendor Calls    Caller's first and last name: Lisy lucio ([address] [place] [address])      Reason for call: Regarding statues of the pt's incontinence supplies       Call back required yes/no and why: Yes, confirm       Best contact number(s): [phone]      Details to clarify the request:      Yoselin Robin

## 2019-09-16 ENCOUNTER — TELEPHONE (OUTPATIENT)
Dept: FAMILY MEDICINE CLINIC | Age: 64
End: 2019-09-16

## 2019-09-16 NOTE — TELEPHONE ENCOUNTER
----- Message from Brittany Lal sent at 9/16/2019 10:21 AM EDT -----  Regarding: Dr. Freedom Montes / Shameka Dallas from [address] [place] [address]  Requesting call back. Caller wants to verify that pt's certificate of medical necessity was received at the practice and filled by pcp.  Caller best contact is [phone]

## 2019-09-24 ENCOUNTER — TELEPHONE (OUTPATIENT)
Dept: FAMILY MEDICINE CLINIC | Age: 64
End: 2019-09-24

## 2019-09-30 ENCOUNTER — TELEPHONE (OUTPATIENT)
Dept: FAMILY MEDICINE CLINIC | Age: 64
End: 2019-09-30

## 2019-09-30 NOTE — TELEPHONE ENCOUNTER
----- Message from Angeline Hickey sent at 9/30/2019  4:26 PM EDT -----  Regarding: Dr. Varela Mis first and last name: [place], [facility]       Reason for call: She is requesting a call back to check on the status of a certificate of a physicians order for incontinent supplies.        Callback required yes/no and why: yes       Best contact number(s): [phone]

## 2019-10-08 ENCOUNTER — TELEPHONE (OUTPATIENT)
Dept: FAMILY MEDICINE CLINIC | Age: 64
End: 2019-10-08

## 2019-10-08 NOTE — TELEPHONE ENCOUNTER
----- Message from Bashir Rojas sent at 10/8/2019  9:17 AM EDT -----  Regarding: Dr. Lashaun Mckeon  General Message/Vendor Calls    Caller's first and last name:  [facility]      Reason for call:  Physician's order for pt's Incontinence Supplies      Callback required yes/no and why: No      Best contact number(s):   [phone]      Details to clarify the request:  Original request for the physician's order for pt's Incontinence supplies was faxed on Tuesday, 10/01/19. No response to date.       Bashir Rojas

## 2019-10-14 ENCOUNTER — TELEPHONE (OUTPATIENT)
Dept: FAMILY MEDICINE CLINIC | Age: 64
End: 2019-10-14

## 2019-10-14 NOTE — TELEPHONE ENCOUNTER
----- Message from Wood Mckeon sent at 10/14/2019  9:13 AM EDT -----  Regarding: Stephany Pitt MD/ telephone  General Message/Vendor Calls    Caller's first and last name: Talat Connors from [address] [employer]      Reason for call: would need physicians order for incontinence supplies      Callback required yes/no and why:      Best contact number(s): [phone] fax [phone]      Details to clarify the request:      Wood Mckeon

## 2019-10-17 NOTE — TELEPHONE ENCOUNTER
10-17-19 I only have order for a shower chair not incontinence supply's can you put this order in Julianna Dear [facility]

## 2019-10-18 ENCOUNTER — TELEPHONE (OUTPATIENT)
Dept: FAMILY MEDICINE CLINIC | Age: 64
End: 2019-10-18

## 2019-10-18 DIAGNOSIS — N32.81 OAB (OVERACTIVE BLADDER): Primary | ICD-10-CM

## 2019-10-18 NOTE — TELEPHONE ENCOUNTER
----- Message from Ronnie Johnson sent at 10/18/2019 12:40 PM EDT -----  Regarding: Dr. David Lew  General Message/Vendor Calls    Caller's first and last name:  [facility]    Reason for call:  Status of form faxed Tuesday 10/01/2019,     Callback required yes/no and why:  yes    Best contact number(s):  [phone]    Details to clarify the request:      Ronnie Johnson

## 2019-10-18 NOTE — TELEPHONE ENCOUNTER
10/18/19 Faxed to [facility] order for incontinence supplies and shower chair along with demographics, to [phone] transmission blanca Lebron LPN

## 2019-10-25 ENCOUNTER — TELEPHONE (OUTPATIENT)
Dept: FAMILY MEDICINE CLINIC | Age: 64
End: 2019-10-25

## 2019-10-25 NOTE — TELEPHONE ENCOUNTER
----- Message from Korbit sent at 10/25/2019  8:32 AM EDT -----  Regarding: Siddharth Call MD/ telephone  General Message/Vendor Calls    Caller's first and last name: [facility]      Reason for call: would need a physicians order for incontinence supplies      Callback required yes/no and why:      Best contact number(s): contact [phone] fax [phone]      Details to clarify the request:      [employer]

## 2019-10-31 ENCOUNTER — TELEPHONE (OUTPATIENT)
Dept: FAMILY MEDICINE CLINIC | Age: 64
End: 2019-10-31

## 2019-10-31 NOTE — TELEPHONE ENCOUNTER
----- Message from Sedrick Cotto sent at 10/31/2019  8:57 AM EDT -----  Regarding: Dr. Layton Duffy: [phone]  Caller's first and last name: Ricardo Sharpe, [facility]  Reason for call: Certificate of Medical Necessity for Nutritional Supplies  Callback required yes/no and why: yes  Best contact number(s): [phone]  Details to clarify the request: fill out form and send it back [phone]

## 2019-10-31 NOTE — TELEPHONE ENCOUNTER
10/31/19 called [facility] @ [phone] , they states they had the  wrong PCP on patient paperwork, they will re fax paper work with the correct PCP to [phone], Pt PCP is Ayanna Alejandre LPN

## 2019-11-26 ENCOUNTER — OFFICE VISIT (OUTPATIENT)
Dept: FAMILY MEDICINE CLINIC | Age: 64
End: 2019-11-26

## 2019-11-26 VITALS
RESPIRATION RATE: 20 BRPM | WEIGHT: 205 LBS | BODY MASS INDEX: 35 KG/M2 | DIASTOLIC BLOOD PRESSURE: 79 MMHG | TEMPERATURE: 97.5 F | SYSTOLIC BLOOD PRESSURE: 113 MMHG | HEIGHT: 64 IN | HEART RATE: 77 BPM | OXYGEN SATURATION: 96 %

## 2019-11-26 DIAGNOSIS — E78.2 MIXED HYPERCHOLESTEROLEMIA AND HYPERTRIGLYCERIDEMIA: ICD-10-CM

## 2019-11-26 DIAGNOSIS — M54.50 CHRONIC BILATERAL LOW BACK PAIN WITHOUT SCIATICA: ICD-10-CM

## 2019-11-26 DIAGNOSIS — G89.29 CHRONIC BILATERAL LOW BACK PAIN WITHOUT SCIATICA: ICD-10-CM

## 2019-11-26 DIAGNOSIS — F32.A DEPRESSION, UNSPECIFIED DEPRESSION TYPE: ICD-10-CM

## 2019-11-26 DIAGNOSIS — F17.200 SMOKER: ICD-10-CM

## 2019-11-26 DIAGNOSIS — E11.9 ENCOUNTER FOR DIABETIC FOOT EXAM (HCC): ICD-10-CM

## 2019-11-26 DIAGNOSIS — Z23 ENCOUNTER FOR IMMUNIZATION: ICD-10-CM

## 2019-11-26 DIAGNOSIS — Z01.00 DIABETIC EYE EXAM (HCC): ICD-10-CM

## 2019-11-26 DIAGNOSIS — E11.9 DIABETIC EYE EXAM (HCC): ICD-10-CM

## 2019-11-26 LAB
GLUCOSE POC: 285 MG/DL
HBA1C MFR BLD HPLC: 10.3 %

## 2019-11-26 RX ORDER — BUPROPION HYDROCHLORIDE 150 MG/1
150 TABLET, EXTENDED RELEASE ORAL 2 TIMES DAILY
Qty: 180 TAB | Refills: 1 | Status: SHIPPED | OUTPATIENT
Start: 2019-11-26 | End: 2020-06-04

## 2019-11-26 RX ORDER — GABAPENTIN 100 MG/1
200 CAPSULE ORAL 3 TIMES DAILY
Qty: 90 CAP | Refills: 0 | Status: SHIPPED | OUTPATIENT
Start: 2019-11-26 | End: 2021-08-26 | Stop reason: SDUPTHER

## 2019-11-26 RX ORDER — METFORMIN HYDROCHLORIDE 1000 MG/1
1000 TABLET ORAL 2 TIMES DAILY WITH MEALS
Qty: 180 TAB | Refills: 1 | Status: SHIPPED | OUTPATIENT
Start: 2019-11-26 | End: 2020-09-28

## 2019-11-26 RX ORDER — INSULIN ASPART 100 [IU]/ML
INJECTION, SUSPENSION SUBCUTANEOUS
Qty: 10 PEN | Refills: 6 | Status: SHIPPED | OUTPATIENT
Start: 2019-11-26 | End: 2021-02-08 | Stop reason: SDUPTHER

## 2019-11-26 RX ORDER — PRAVASTATIN SODIUM 20 MG/1
20 TABLET ORAL
Qty: 30 TAB | Refills: 4 | Status: SHIPPED | OUTPATIENT
Start: 2019-11-26 | End: 2020-04-30

## 2019-11-26 RX ORDER — CHOLECALCIFEROL TAB 125 MCG (5000 UNIT) 125 MCG
5000 TAB ORAL DAILY
Qty: 90 TAB | Refills: 1 | Status: SHIPPED | OUTPATIENT
Start: 2019-11-26 | End: 2020-06-04

## 2019-11-26 RX ORDER — PEN NEEDLE, DIABETIC 31 GX3/16"
NEEDLE, DISPOSABLE MISCELLANEOUS
Qty: 200 PEN NEEDLE | Refills: 3 | Status: SHIPPED | OUTPATIENT
Start: 2019-11-26

## 2019-11-26 NOTE — PROGRESS NOTES
Chief Complaint   Patient presents with    Joint Pain    Allergies     eyes     HPI:  Luisa Coombs is a 59 y.o.  AA female with h/o diabetes type 2, hypertension, hypercholesterolemia and diabetic neuropathy presents accompanied by son for follow up. Patient has complaints on foot pain. She has beenseeing [facility] but whishes to come back to our practace. Patient is asking for referral to eye doctor. Review of Systems  As per hpi    Past Medical History:   Diagnosis Date    Arthritis     shoulders and knees.  Diabetes ([address]     insulin dependent     Diabetes ([address]     Hypercholesterolemia     Hypertension      Past Surgical History:   Procedure Laterality Date    ABDOMEN SURGERY PROC UNLISTED          COLONOSCOPY N/A 2019    COLONOS[facility] performed by Sammi Block MD at [facility] ENDOSCOPY    HX COLONOSCOPY      HX GYN      hysterectomy    HX GYN      hysterectomy, total for fibroid     Social History     Socioeconomic History    Marital status:      Spouse name: Not on file    Number of children: Not on file    Years of education: Not on file    Highest education level: Not on file   Tobacco Use    Smoking status: Current Every Day Smoker     Packs/day: 2.00     Years: 41.00     Pack years: 82.00     Types: Cigarettes    Smokeless tobacco: Never Used   Substance and Sexual Activity    Alcohol use: No     Frequency: Never     Binge frequency: Never    Drug use: No    Sexual activity: Never   Social History Narrative    ** Merged History Encounter **          Family History   Problem Relation Age of Onset    Diabetes Mother     Diabetes Father     Diabetes Sister     Diabetes Brother      Current Outpatient Medications   Medication Sig Dispense Refill    lisinopril (PRINIVIL, ZESTRIL) 5 mg tablet Take 1 Tab by mouth daily. 90 Tab 1    meloxicam (MOBIC) 15 mg tablet Take 1 Tab by mouth daily.  20 Tab 1    methocarbamol (ROBAXIN) 750 mg tablet Take 1 Tab by mouth four (4) times daily. 30 Tab 0    gabapentin (NEURONTIN) 100 mg capsule Take 2 Caps by mouth three (3) times daily. 180 Cap 3    albuterol (PROVENTIL HFA, VENTOLIN HFA, PROAIR HFA) 90 mcg/actuation inhaler Take 1 Puff by inhalation every six (6) hours as needed for Wheezing. 1 Inhaler 1    buPROPion SR (WELLBUTRIN SR) 150 mg SR tablet Take 1 Tab by mouth two (2) times a day. Take 1 tab daily x 1 week then increase to twice daily- for depression & smoking 180 Tab 0    cholecalciferol, VITAMIN D3, (VITAMIN D3) 5,000 unit tab tablet Take 1 Tab by mouth daily. 90 Tab 0    fluticasone propion-salmeterol (ADVAIR/WIXELA) 100-50 mcg/dose diskus inhaler Take 1 Puff by inhalation two (2) times a day. 1 Inhaler 0    FREESTYLE LANCETS 28 gauge misc TESTING 3 TIMES A DAY. E11.65 100 Lancet 2    glucose blood VI test strips (FREESTYLE INSULINX) strip Check sugar 3 times a day 300 Strip 4    insulin aspart protamine/insulin aspart (NOVOLOG MIX 70-30FLEXPEN U-100) 100 unit/mL (70-30) inpn 40 units at noon (first meal) and 40 units at 7 pm (second meal) 10 Pen 6    Insulin Needles, Disposable, (BD ULTRA-FINE MINI PEN NEEDLE) 31 gauge x 3/16\" ndle Use to inject insulin twice daily 200 Pen Needle 3    lancets 32 gauge misc 1 Each by Does Not Apply route three (3) times daily. 300 Lancet 3    metFORMIN (GLUCOPHAGE) 1,000 mg tablet Take 1 Tab by mouth two (2) times daily (with meals). 180 Tab 0    Omeprazole delayed release (PRILOSEC D/R) 20 mg tablet Take 1 Tab by mouth daily as needed (acid reflux). 90 Tab 0    pravastatin (PRAVACHOL) 20 mg tablet Take 1 Tab by mouth nightly.  90 Tab 0    Blood-Glucose Meter (FREESTYLE FLASH SYSTEM) monitoring kit Test 3 Times Daily   DX E11.40,  E11.65 1 Kit 0     No Known Allergies    Objective:  Visit Vitals  /79   Pulse 77   Temp 97.5 °F (36.4 °C) (Oral)   Resp 20   Ht 5' 4\" (1.626 m)   Wt 205 lb (93 kg)   LMP  (LMP Unknown)   SpO2 96%   BMI 35.19 kg/m²     Physical Exam:   General appearance - alert, well appearing in no distress  Mental status - alert, oriented to person, place, and time  EYE-PERRL, EOMI  Neck - supple, no significant adenopathy   Chest - clear to auscultation, no wheezes, rales or rhonchi  Heart - normal rate, regular rhythm, normal blood pressure  Abdomen - soft, nontender, nondistended, no organomegaly  Ext-peripheral pulses normal, no pedal edema  Neuro -alert, oriented, normal speech, no focal findings   Back-full range of motion, no tenderness, palpable spasm or pain on motion     Results for orders placed or performed in visit on 08/08/19   LIPID PANEL   Result Value Ref Range    Cholesterol, total 181 100 - 199 mg/dL    Triglyceride 181 (H) 0 - 149 mg/dL    HDL Cholesterol 43 >39 mg/dL    VLDL, calculated 36 5 - 40 mg/dL    LDL, calculated 102 (H) 0 - 99 mg/dL   VITAMIN D, 25 HYDROXY   Result Value Ref Range    VITAMIN D, 25-HYDROXY 21.1 (L) 30.0 - 100.0 ng/mL   URINALYSIS W/ RFLX MICROSCOPIC   Result Value Ref Range    Specific Gravity 1.022 1.005 - 1.030    pH (UA) 5.5 5.0 - 7.5    Color Yellow Yellow    Appearance Clear Clear    Leukocyte Esterase Negative Negative    Protein Negative Negative/Trace    Glucose 2+ (A) Negative    Ketone Negative Negative    Blood Negative Negative    Bilirubin Negative Negative    Urobilinogen 0.2 0.2 - 1.0 mg/dL    Nitrites Negative Negative    Microscopic Examination Comment    MICROALBUMIN, UR, RAND W/ MICROALB/CREAT RATIO   Result Value Ref Range    Creatinine, urine 94.9 Not Estab. mg/dL    Microalbumin, urine 33.1 Not Estab. ug/mL    Microalb/Creat ratio (ug/mg creat.) 34.9 (H) 0.0 - 30.0 mg/g creat     Assessment/Plan:  Diagnoses and all orders for this visit:    DM type 2, uncontrolled, with neuropathy (HCC)  -     AMB POC HEMOGLOBIN A1C  -     AMB POC GLUCOSE BLOOD, BY GLUCOSE MONITORING DEVICE  -     insulin aspart protamine/insulin aspart (NOVOLOG MIX 70-30FLEXPEN U-100) 100 unit/mL (70-30) inpn; 40 units at noon (first meal) and 40 units at 7 pm (second meal), Normal, Disp-10 Pen, R-6  -     Insulin Needles, Disposable, (BD ULTRA-FINE MINI PEN NEEDLE) 31 gauge x 3/16\" ndle; Use to inject insulin twice daily, Normal, Disp-200 Pen Needle, R-3  -     metFORMIN (GLUCOPHAGE) 1,000 mg tablet; Take 1 Tab by mouth two (2) times daily (with meals). , Normal, Disp-180 Tab, R-1    Encounter for immunization  -     varicella-zoster recombinant, PF, (SHINGRIX, PF,) 50 mcg/0.5 mL susr injection; 0.5 mL by IntraMUSCular route once for 1 dose., Normal, Disp-0.5 mL, R-0    Diabetic eye exam ([facility]  -     REFERRAL TO OPTOMETRY    Encounter for diabetic foot exam ([facility]  -     REFERRAL TO PODIATRY    Chronic bilateral low back pain without sciatica  -     gabapentin (NEURONTIN) 100 mg capsule; Take 2 Caps by mouth three (3) times daily. , Print, Disp-90 Cap, R-0    Smoker  -     buPROPion SR (WELLBUTRIN SR) 150 mg SR tablet; Take 1 Tab by mouth two (2) times a day. Take 1 tab daily x 1 week then increase to twice daily- for depression & smoking, Normal, Disp-180 Tab, R-1    Depression, unspecified depression type  -     buPROPion SR (WELLBUTRIN SR) 150 mg SR tablet; Take 1 Tab by mouth two (2) times a day. Take 1 tab daily x 1 week then increase to twice daily- for depression & smoking, Normal, Disp-180 Tab, R-1    Mixed hypercholesterolemia and hypertriglyceridemia  -     pravastatin (PRAVACHOL) 20 mg tablet; Take 1 Tab by mouth nightly., Normal, Disp-30 Tab, R-4    Other orders  -     cholecalciferol (VITAMIN D3) (5000 Units/125 mcg) tab tablet; Take 1 Tab by mouth daily. , Normal, Disp-90 Tab, R-1      Patient Instructions        Learning About Meal Planning for Diabetes  Why plan your meals? Meal planning can be a key part of managing diabetes. Planning meals and snacks with the right balance of carbohydrate, protein, and fat can help you keep your blood sugar at the target level you set with your doctor.   You don't have to eat special foods. You can eat what your family eats, including sweets once in a while. But you do have to pay attention to how often you eat and how much you eat of certain foods. You may want to work with a dietitian or a certified diabetes educator. He or she can give you tips and meal ideas and can answer your questions about meal planning. This health professional can also help you reach a healthy weight if that is one of your goals. What plan is right for you? Your dietitian or diabetes educator may suggest that you start with the plate format or carbohydrate counting. The plate format  The plate format is a simple way to help you manage how you eat. You plan meals by learning how much space each food should take on a plate. Using the plate format helps you spread carbohydrate throughout the day. It can make it easier to keep your blood sugar level within your target range. It also helps you see if you're eating healthy portion sizes. To use the plate format, you put non-starchy vegetables on half your plate. Add meat or meat substitutes on one-quarter of the plate. Put a grain or starchy vegetable (such as brown rice or a potato) on the final quarter of the plate. You can add a small piece of fruit and some low-fat or fat-free milk or yogurt, depending on your carbohydrate goal for each meal.  Here are some tips for using the plate format:  · Make sure that you are not using an oversized plate. A 9-inch plate is best. Many restaurants use larger plates. · Get used to using the plate format at home. Then you can use it when you eat out. · Write down your questions about using the plate format. Talk to your doctor, a dietitian, or a diabetes educator about your concerns. Carbohydrate counting  With carbohydrate counting, you plan meals based on the amount of carbohydrate in each food. Carbohydrate raises blood sugar higher and more quickly than any other nutrient.  It is found in desserts, breads and cereals, and fruit. It's also found in starchy vegetables such as potatoes and corn, grains such as rice and pasta, and milk and yogurt. Spreading carbohydrate throughout the day helps keep your blood sugar levels within your target range. Your daily amount depends on several things, including your weight, how active you are, which diabetes medicines you take, and what your goals are for your blood sugar levels. A registered dietitian or diabetes educator can help you plan how much carbohydrate to include in each meal and snack. A guideline for your daily amount of carbohydrate is:  · 45 to 60 grams at each meal. That's about the same as 3 to 4 carbohydrate servings. · 15 to 20 grams at each snack. That's about the same as 1 carbohydrate serving. The Nutrition Facts label on packaged foods tells you how much carbohydrate is in a serving of the food. First, look at the serving size on the food label. Is that the amount you eat in a serving? All of the nutrition information on a food label is based on that serving size. So if you eat more or less than that, you'll need to adjust the other numbers. Total carbohydrate is the next thing you need to look for on the label. If you count carbohydrate servings, one serving of carbohydrate is 15 grams. For foods that don't come with labels, such as fresh fruits and vegetables, you'll need a guide that lists carbohydrate in these foods. Ask your doctor, dietitian, or diabetes educator about books or other nutrition guides you can use. If you take insulin, you need to know how many grams of carbohydrate are in a meal. This lets you know how much rapid-acting insulin to take before you eat. If you use an insulin pump, you get a constant rate of insulin during the day. So the pump must be programmed at meals to give you extra insulin to cover the rise in blood sugar after meals. When you know how much carbohydrate you will eat, you can take the right amount of insulin.  Or, if you always use the same amount of insulin, you need to make sure that you eat the same amount of carbohydrate at meals. If you need more help to understand carbohydrate counting and food labels, ask your doctor, dietitian, or diabetes educator. How do you get started with meal planning? Here are some tips to get started:  · Plan your meals a week at a time. Don't forget to include snacks too. · Use cookbooks or online recipes to plan several main meals. Plan some quick meals for busy nights. You also can double some recipes that freeze well. Then you can save half for other busy nights when you don't have time to cook. · Make sure you have the ingredients you need for your recipes. If you're running low on basic items, put these items on your shopping list too. · List foods that you use to make breakfasts, lunches, and snacks. List plenty of fruits and vegetables. · Post this list on the refrigerator. Add to it as you think of more things you need. · Take the list to the store to do your weekly shopping. Follow-up care is a key part of your treatment and safety. Be sure to make and go to all appointments, and call your doctor if you are having problems. It's also a good idea to know your test results and keep a list of the medicines you take. Where can you learn more? Go to http://alley-serg.info/. Steve Stamp in the search box to learn more about \"Learning About Meal Planning for Diabetes. \"  Current as of: April 16, 2019  Content Version: 12.2  © 2672-7630 [employer]. Care instructions adapted under license by [employer] (which disclaims liability or warranty for this information). If you have questions about a medical condition or this instruction, always ask your healthcare professional. [address] any warranty or liability for your use of this information.         Follow-up and Dispositions    · Return in about 3 months (around 2/26/2020), or if symptoms worsen or fail to improve, for routine follow up.

## 2019-11-26 NOTE — PATIENT INSTRUCTIONS

## 2020-01-30 ENCOUNTER — OFFICE VISIT (OUTPATIENT)
Dept: FAMILY MEDICINE CLINIC | Age: 65
End: 2020-01-30

## 2020-01-30 VITALS
BODY MASS INDEX: 35.51 KG/M2 | SYSTOLIC BLOOD PRESSURE: 128 MMHG | RESPIRATION RATE: 20 BRPM | HEIGHT: 64 IN | WEIGHT: 208 LBS | HEART RATE: 78 BPM | DIASTOLIC BLOOD PRESSURE: 70 MMHG | TEMPERATURE: 97.6 F | OXYGEN SATURATION: 96 %

## 2020-01-30 DIAGNOSIS — J40 BRONCHITIS DUE TO TOBACCO USE: ICD-10-CM

## 2020-01-30 DIAGNOSIS — M54.40 ACUTE RIGHT-SIDED LOW BACK PAIN WITH SCIATICA, SCIATICA LATERALITY UNSPECIFIED: ICD-10-CM

## 2020-01-30 DIAGNOSIS — Z72.0 BRONCHITIS DUE TO TOBACCO USE: ICD-10-CM

## 2020-01-30 LAB
BILIRUB UR QL STRIP: NEGATIVE
GLUCOSE POC: 309 MG/DL
GLUCOSE UR-MCNC: NORMAL MG/DL
HBA1C MFR BLD HPLC: 8.6 %
KETONES P FAST UR STRIP-MCNC: NEGATIVE MG/DL
PH UR STRIP: 5.5 [PH] (ref 4.6–8)
PROT UR QL STRIP: NEGATIVE
SP GR UR STRIP: 1.03 (ref 1–1.03)
UA UROBILINOGEN AMB POC: NORMAL (ref 0.2–1)
URINALYSIS CLARITY POC: CLEAR
URINALYSIS COLOR POC: NORMAL
URINE BLOOD POC: NEGATIVE
URINE LEUKOCYTES POC: NEGATIVE
URINE NITRITES POC: NEGATIVE

## 2020-01-30 RX ORDER — ALBUTEROL SULFATE 90 UG/1
1 AEROSOL, METERED RESPIRATORY (INHALATION)
Qty: 1 INHALER | Refills: 1 | Status: SHIPPED | OUTPATIENT
Start: 2020-01-30 | End: 2021-08-26 | Stop reason: SDUPTHER

## 2020-01-30 RX ORDER — LANCETS 28 GAUGE
EACH MISCELLANEOUS
Qty: 100 LANCET | Refills: 2 | Status: SHIPPED | OUTPATIENT
Start: 2020-01-30 | End: 2020-06-04

## 2020-01-30 RX ORDER — GUAIFENESIN 600 MG/1
600 TABLET, EXTENDED RELEASE ORAL 2 TIMES DAILY
Qty: 14 TAB | Refills: 0 | Status: SHIPPED | OUTPATIENT
Start: 2020-01-30 | End: 2020-02-06

## 2020-01-30 RX ORDER — AMOXICILLIN AND CLAVULANATE POTASSIUM 500; 125 MG/1; MG/1
1 TABLET, FILM COATED ORAL EVERY 12 HOURS
Qty: 14 TAB | Refills: 0 | Status: SHIPPED | OUTPATIENT
Start: 2020-01-30 | End: 2020-02-06

## 2020-01-30 NOTE — PATIENT INSTRUCTIONS
COPD Exacerbation Plan: Care Instructions  Your Care Instructions    If you have chronic obstructive pulmonary disease (COPD), your usual shortness of breath could suddenly get worse. You may start coughing more and have more mucus. This flare-up is called a COPD exacerbation (say \"ts-HUY-uw-BAY-sonya\"). A lung infection or air pollution could set off an exacerbation. Sometimes it can happen after a quick change in temperature or being around chemicals. Work with your doctor to make a plan for dealing with an exacerbation. You can better manage it if you plan ahead. Follow-up care is a key part of your treatment and safety. Be sure to make and go to all appointments, and call your doctor if you are having problems. It's also a good idea to know your test results and keep a list of the medicines you take. How can you care for yourself at home? During an exacerbation  · Do not panic if you start to have one. Quick treatment at home may help you prevent serious breathing problems. If you have a COPD exacerbation plan that you developed with your doctor, follow it. · Take your medicines exactly as your doctor tells you.  ? Use your inhaler as directed by your doctor. If your symptoms do not get better after you use your medicine, have someone take you to the emergency room. Call an ambulance if necessary. ? With inhaled medicines, a spacer or a nebulizer may help you get more medicine to your lungs. Ask your doctor or pharmacist how to use them properly. Practice using the spacer in front of a mirror before you have an exacerbation. This may help you get the medicine into your lungs quickly. ? If your doctor has given you steroid pills, take them as directed. ? Your doctor may have given you a prescription for antibiotics, which you can fill if you need it. ? Talk to your doctor if you have any problems with your medicine.  And call your doctor if you have to use your antibiotic or steroid pills.  Preventing an exacerbation  · Do not smoke. This is the most important step you can take to prevent more damage to your lungs and prevent problems. If you already smoke, it is never too late to stop. If you need help quitting, talk to your doctor about stop-smoking programs and medicines. These can increase your chances of quitting for good. · Take your daily medicines as prescribed. · Avoid colds and flu. ? Get a pneumococcal vaccine. ? Get a flu vaccine each year, as soon as it is available. Ask those you live or work with to do the same, so they will not get the flu and infect you. ? Try to stay away from people with colds or the flu. ? Wash your hands often. · Avoid secondhand smoke; air pollution; cold, dry air; hot, humid air; and high altitudes. Stay at home with your windows closed when air pollution is bad. · Learn breathing techniques for COPD, such as breathing through pursed lips. These techniques can help you breathe easier during an exacerbation. When should you call for help? Call 911 anytime you think you may need emergency care. For example, call if:    · You have severe trouble breathing.     · You have severe chest pain.    Call your doctor now or seek immediate medical care if:    · You have new or worse shortness of breath.     · You develop new chest pain.     · You are coughing more deeply or more often, especially if you notice more mucus or a change in the color of your mucus.     · You cough up blood.     · You have new or increased swelling in your legs or belly.     · You have a fever.    Watch closely for changes in your health, and be sure to contact your doctor if:    · You need to use your antibiotic or steroid pills.     · Your symptoms are getting worse. Where can you learn more? Go to http://alley-serg.info/. Enter H632 in the search box to learn more about \"COPD Exacerbation Plan: Care Instructions. \"  Current as of: June 9, 2019  Content Version: 12.2  © 7565-7767 [employer]. Care instructions adapted under license by [employer] (which disclaims liability or warranty for this information). If you have questions about a medical condition or this instruction, always ask your healthcare professional. [address] any warranty or liability for your use of this information.

## 2020-01-30 NOTE — PROGRESS NOTES
Chief Complaint   Patient presents with    Follow-up     3 MONTH     Ear Fullness    LOW BACK PAIN     HPI:  Wade Wong is a 59 y.o. female with h/o diabetes type 2, hypertension, hypercholesterolemia and diabetic neuropathy presents accompanied by son for follow up. Patient has complaint ear fullness, no pain, no drain, no fever/chills. a1C shows improved glycemic control. Blood pressure is at goal.  .  Review of Systems  As per hpi    Past Medical History:   Diagnosis Date    Arthritis     shoulders and knees.  Diabetes ([address]     insulin dependent     Diabetes ([address]     Hypercholesterolemia     Hypertension      Past Surgical History:   Procedure Laterality Date    ABDOMEN SURGERY PROC UNLISTED          COLONOSCOPY N/A 2019    COLON[facility] Ram[facility] at [facility] ENDOSCOPY    HX COLONOSCOPY      HX GYN      hysterectomy    HX GYN      hysterectomy, total for fibroid     Social History     Socioeconomic History    Marital status:      Spouse name: Not on file    Number of children: Not on file    Years of education: Not on file    Highest education level: Not on file   Tobacco Use    Smoking status: Current Every Day Smoker     Packs/day: 2.00     Years: 41.00     Pack years: 82.00     Types: Cigarettes    Smokeless tobacco: Never Used   Substance and Sexual Activity    Alcohol use: No     Frequency: Never     Binge frequency: Never    Drug use: No    Sexual activity: Never   Social History Narrative    ** Merged History Encounter **          Family History   Problem Relation Age of Onset    Diabetes Mother     Diabetes Father     Diabetes Sister     Diabetes Brother      Current Outpatient Medications   Medication Sig Dispense Refill    gabapentin (NEURONTIN) 100 mg capsule Take 2 Caps by mouth three (3) times daily. 90 Cap 0    buPROPion SR (WELLBUTRIN SR) 150 mg SR tablet Take 1 Tab by mouth two (2) times a day.  Take 1 tab daily x 1 week then increase to twice daily- for depression & smoking 180 Tab 1    cholecalciferol (VITAMIN D3) (5000 Units/125 mcg) tab tablet Take 1 Tab by mouth daily. 90 Tab 1    insulin aspart protamine/insulin aspart (NOVOLOG MIX 70-30FLEXPEN U-100) 100 unit/mL (70-30) inpn 40 units at noon (first meal) and 40 units at 7 pm (second meal) 10 Pen 6    Insulin Needles, Disposable, (BD ULTRA-FINE MINI PEN NEEDLE) 31 gauge x 3/16\" ndle Use to inject insulin twice daily 200 Pen Needle 3    metFORMIN (GLUCOPHAGE) 1,000 mg tablet Take 1 Tab by mouth two (2) times daily (with meals). 180 Tab 1    pravastatin (PRAVACHOL) 20 mg tablet Take 1 Tab by mouth nightly. 30 Tab 4    lisinopril (PRINIVIL, ZESTRIL) 5 mg tablet Take 1 Tab by mouth daily. 90 Tab 1    meloxicam (MOBIC) 15 mg tablet Take 1 Tab by mouth daily. 20 Tab 1    methocarbamol (ROBAXIN) 750 mg tablet Take 1 Tab by mouth four (4) times daily. 30 Tab 0    albuterol (PROVENTIL HFA, VENTOLIN HFA, PROAIR HFA) 90 mcg/actuation inhaler Take 1 Puff by inhalation every six (6) hours as needed for Wheezing. 1 Inhaler 1    fluticasone propion-salmeterol (ADVAIR/WIXELA) 100-50 mcg/dose diskus inhaler Take 1 Puff by inhalation two (2) times a day. 1 Inhaler 0    FREESTYLE LANCETS 28 gauge misc TESTING 3 TIMES A DAY. E11.65 100 Lancet 2    glucose blood VI test strips (FREESTYLE INSULINX) strip Check sugar 3 times a day 300 Strip 4    lancets 32 gauge misc 1 Each by Does Not Apply route three (3) times daily. 300 Lancet 3    Omeprazole delayed release (PRILOSEC D/R) 20 mg tablet Take 1 Tab by mouth daily as needed (acid reflux).  90 Tab 0    Blood-Glucose Meter (FREESTYLE FLASH SYSTEM) monitoring kit Test 3 Times Daily   DX E11.40,  E11.65 1 Kit 0     No Known Allergies    Objective:  Visit Vitals  /70   Pulse 78   Temp 97.6 °F (36.4 °C)   Resp 20   Ht 5' 4\" (1.626 m)   Wt 208 lb (94.3 kg)   LMP  (LMP Unknown)   SpO2 96%   BMI 35.70 kg/m²     Physical Exam:   General appearance - alert, well appearing in no distress  Mental status - alert, oriented to person, place, and time  ENT-ENT exam normal, no neck nodes or sinus tenderness  Neck - supple, no significant adenopathy   Chest - clear to auscultation, no wheezes, rales or rhonchi  Heart - normal rate, regular rhythm, normal blood pressure  Abdomen - soft, nontender, nondistended, no organomegaly  Ext-peripheral pulses normal, no pedal edema  Neuro - no focal findings  Back-full range of motion, no tenderness, palpable spasm or pain on motion     Results for orders placed or performed in visit on 01/30/20   AMB POC GLUCOSE BLOOD, BY GLUCOSE MONITORING DEVICE   Result Value Ref Range    Glucose  mg/dL   AMB POC HEMOGLOBIN A1C   Result Value Ref Range    Hemoglobin A1c (POC) 8.6 %   AMB POC URINALYSIS DIP STICK AUTO W/ MICRO   Result Value Ref Range    Color (UA POC) Orange     Clarity (UA POC) Clear     Glucose (UA POC) Trace Negative    Bilirubin (UA POC) Negative Negative    Ketones (UA POC) Negative Negative    Specific gravity (UA POC) 1.030 1.001 - 1.035    Blood (UA POC) Negative Negative    pH (UA POC) 5.5 4.6 - 8.0    Protein (UA POC) Negative Negative    Urobilinogen (UA POC) 0.2 mg/dL 0.2 - 1    Nitrites (UA POC) Negative Negative    Leukocyte esterase (UA POC) Negative Negative     Assessment/Plan:  Diagnoses and all orders for this visit:    DM type 2, uncontrolled, with neuropathy (HCC)  -     AMB POC GLUCOSE BLOOD, BY GLUCOSE MONITORING DEVICE  -     AMB POC HEMOGLOBIN A1C  -     glucose blood VI test strips (FREESTYLE INSULINX) strip; Check sugar 3 times a day, Normal, Disp-300 Strip, R-4  -     FREESTYLE LANCETS 28 gauge misc; TESTING 3 TIMES A DAY.  E11.65, Normal, Disp-100 Lancet, R-2, KEVIN    Acute right-sided low back pain with sciatica, sciatica laterality unspecified  -     AMB POC URINALYSIS DIP STICK AUTO W/ MICRO    Bronchitis due to tobacco use  -     amoxicillin-clavulanate (AUGMENTIN) 500-125 mg per tablet; Take 1 Tab by mouth every twelve (12) hours for 7 days. , Normal, Disp-14 Tab, R-0  -     guaiFENesin ER (MUCINEX) 600 mg ER tablet; Take 1 Tab by mouth two (2) times a day for 7 days. , Normal, Disp-14 Tab, R-0  -     albuterol (PROVENTIL HFA, VENTOLIN HFA, PROAIR HFA) 90 mcg/actuation inhaler; Take 1 Puff by inhalation every six (6) hours as needed for Wheezing., Normal, Disp-1 Inhaler, R-1        Patient Instructions          COPD Exacerbation Plan: Care Instructions  Your Care Instructions    If you have chronic obstructive pulmonary disease (COPD), your usual shortness of breath could suddenly get worse. You may start coughing more and have more mucus. This flare-up is called a COPD exacerbation (say \"qy-DTI-nt-BAY-un\"). A lung infection or air pollution could set off an exacerbation. Sometimes it can happen after a quick change in temperature or being around chemicals. Work with your doctor to make a plan for dealing with an exacerbation. You can better manage it if you plan ahead. Follow-up care is a key part of your treatment and safety. Be sure to make and go to all appointments, and call your doctor if you are having problems. It's also a good idea to know your test results and keep a list of the medicines you take. How can you care for yourself at home? During an exacerbation  · Do not panic if you start to have one. Quick treatment at home may help you prevent serious breathing problems. If you have a COPD exacerbation plan that you developed with your doctor, follow it. · Take your medicines exactly as your doctor tells you.  ? Use your inhaler as directed by your doctor. If your symptoms do not get better after you use your medicine, have someone take you to the emergency room. Call an ambulance if necessary. ? With inhaled medicines, a spacer or a nebulizer may help you get more medicine to your lungs. Ask your doctor or pharmacist how to use them properly.  Practice using the spacer in front of a mirror before you have an exacerbation. This may help you get the medicine into your lungs quickly. ? If your doctor has given you steroid pills, take them as directed. ? Your doctor may have given you a prescription for antibiotics, which you can fill if you need it. ? Talk to your doctor if you have any problems with your medicine. And call your doctor if you have to use your antibiotic or steroid pills. Preventing an exacerbation  · Do not smoke. This is the most important step you can take to prevent more damage to your lungs and prevent problems. If you already smoke, it is never too late to stop. If you need help quitting, talk to your doctor about stop-smoking programs and medicines. These can increase your chances of quitting for good. · Take your daily medicines as prescribed. · Avoid colds and flu. ? Get a pneumococcal vaccine. ? Get a flu vaccine each year, as soon as it is available. Ask those you live or work with to do the same, so they will not get the flu and infect you. ? Try to stay away from people with colds or the flu. ? Wash your hands often. · Avoid secondhand smoke; air pollution; cold, dry air; hot, humid air; and high altitudes. Stay at home with your windows closed when air pollution is bad. · Learn breathing techniques for COPD, such as breathing through pursed lips. These techniques can help you breathe easier during an exacerbation. When should you call for help? Call 911 anytime you think you may need emergency care.  For example, call if:    · You have severe trouble breathing.     · You have severe chest pain.    Call your doctor now or seek immediate medical care if:    · You have new or worse shortness of breath.     · You develop new chest pain.     · You are coughing more deeply or more often, especially if you notice more mucus or a change in the color of your mucus.     · You cough up blood.     · You have new or increased swelling in your legs or belly.     · You have a fever.    Watch closely for changes in your health, and be sure to contact your doctor if:    · You need to use your antibiotic or steroid pills.     · Your symptoms are getting worse. Where can you learn more? Go to http://alley-serg.info/. Enter F782 in the search box to learn more about \"COPD Exacerbation Plan: Care Instructions. \"  Current as of: June 9, 2019  Content Version: 12.2  © 0332-1331 [employer]. Care instructions adapted under license by [employer] (which disclaims liability or warranty for this information). If you have questions about a medical condition or this instruction, always ask your healthcare professional. [address] any warranty or liability for your use of this information. Follow-up and Dispositions    · Return if symptoms worsen or fail to improve, for routine follow up.

## 2020-02-08 NOTE — ED NOTES
Assumed care of patient at this time. Pts son reports that she has had neck pain for a few days. They deny injury.  While she was in the waiting room, she started having some acid reflux and belching NERVE BLOCK Left 2019    NERVE BLOCK (DEFINE) - # 1 L5-S1 performed by Jennifer Diamond MD at [address]  2017    CARDIAC CATHETERIZATION  2019    CARPAL TUNNEL RELEASE Right      SECTION      x3    COLONOSCOPY  2018    polypectomy    COLONOSCOPY N/A 2018    COLONOSCOPY WITH BIOPSY performed by Sandrine Callahan MD at [address] [place] [address]      HAND TENDON SURGERY [facility] INJECTION PROCEDURE FOR SACROILIAC JOINT Left 2019    SACROILIAC JOINT INJECTION - #1 performed by Jennifer Diamond MD at [facility]  2019    SACROILIAC JOINT INJECTION - #1     NERVE BLOCK Left 2019    #1 L5-S1    TONSILLECTOMY      TUBAL LIGATION       CURRENT MEDICATIONS       Previous Medications    AMLODIPINE (NORVASC) 5 MG TABLET    Take by mouth daily    ASPIRIN 81 MG TABLET    Take 1 tablet by mouth daily (with breakfast)    ATORVASTATIN (LIPITOR) 80 MG TABLET    Take 1 tablet by mouth daily    HYDROCHLOROTHIAZIDE (HYDRODIURIL) 25 MG TABLET    Take 25 mg by mouth daily    LOSARTAN (COZAAR) 100 MG TABLET    take 1 tablet by mouth once daily    METOPROLOL TARTRATE (LOPRESSOR) 25 MG TABLET    take 1 tablet by mouth twice a day    MULTIPLE VITAMINS-MINERALS (ONE-A-DAY 50 PLUS PO)    Take 1 tablet by mouth daily    NITROGLYCERIN (NITROSTAT) 0.4 MG SL TABLET    up to max of 3 total doses. If no relief after 1 dose, call 911.     OMEPRAZOLE (PRILOSEC) 40 MG DELAYED RELEASE CAPSULE    take 1 capsule by mouth once daily    POTASSIUM CHLORIDE (KLOR-CON M) 20 MEQ EXTENDED RELEASE TABLET    Take 1 tablet by mouth daily    TICAGRELOR (BRILINTA) 90 MG TABS TABLET    Take 1 tablet by mouth 2 times daily    TRAMADOL (ULTRAM) 50 MG TABLET    take 1 tablet by mouth once daily if needed     ALLERGIES     is allergic to bee venom; tetracyclines & related;

## 2020-04-30 DIAGNOSIS — E78.2 MIXED HYPERCHOLESTEROLEMIA AND HYPERTRIGLYCERIDEMIA: ICD-10-CM

## 2020-04-30 RX ORDER — PRAVASTATIN SODIUM 20 MG/1
TABLET ORAL
Qty: 30 TAB | Refills: 4 | Status: SHIPPED | OUTPATIENT
Start: 2020-04-30 | End: 2020-11-09 | Stop reason: SDUPTHER

## 2020-06-04 DIAGNOSIS — F32.A DEPRESSION, UNSPECIFIED DEPRESSION TYPE: ICD-10-CM

## 2020-06-04 DIAGNOSIS — F17.200 SMOKER: ICD-10-CM

## 2020-06-04 RX ORDER — LANCETS 28 GAUGE
EACH MISCELLANEOUS
Qty: 100 LANCET | Refills: 2 | Status: SHIPPED | OUTPATIENT
Start: 2020-06-04 | End: 2020-11-04 | Stop reason: SDUPTHER

## 2020-06-04 RX ORDER — CHOLECALCIFEROL (VITAMIN D3) 125 MCG
CAPSULE ORAL
Qty: 30 CAP | Refills: 5 | Status: SHIPPED | OUTPATIENT
Start: 2020-06-04 | End: 2021-01-11

## 2020-06-04 RX ORDER — BUPROPION HYDROCHLORIDE 150 MG/1
TABLET, EXTENDED RELEASE ORAL
Qty: 60 TAB | Refills: 5 | Status: SHIPPED | OUTPATIENT
Start: 2020-06-04 | End: 2020-09-28 | Stop reason: SDUPTHER

## 2020-06-17 ENCOUNTER — TELEPHONE (OUTPATIENT)
Dept: FAMILY MEDICINE CLINIC | Age: 65
End: 2020-06-17

## 2020-06-17 NOTE — TELEPHONE ENCOUNTER
Call patient spoke with the son-in-law regarding mother going to the ER for neck pain  and now need an in office appt. Was given 6/24 at 3pm. Suggest if it get worst please take mother back to the ER.

## 2020-06-17 NOTE — TELEPHONE ENCOUNTER
----- Message from Conor Nunn sent at 6/17/2020 10:47 AM EDT -----  Regarding: DR BYRD [facility] / Dago Solano Message/Vendor Calls    Deidra Merlos son in [place] of patient     Persistent back and neck spasms    Requesting an in office appt as soon as possible         Callback required   Best contact number(s): [phone]            Conor Nunn

## 2020-06-24 ENCOUNTER — OFFICE VISIT (OUTPATIENT)
Dept: FAMILY MEDICINE CLINIC | Age: 65
End: 2020-06-24

## 2020-06-24 VITALS
BODY MASS INDEX: 35.78 KG/M2 | HEIGHT: 64 IN | TEMPERATURE: 98 F | HEART RATE: 80 BPM | WEIGHT: 209.6 LBS | SYSTOLIC BLOOD PRESSURE: 139 MMHG | DIASTOLIC BLOOD PRESSURE: 72 MMHG

## 2020-06-24 DIAGNOSIS — I10 HYPERTENSION, WELL CONTROLLED: ICD-10-CM

## 2020-06-24 DIAGNOSIS — R35.0 FREQUENCY OF URINATION: ICD-10-CM

## 2020-06-24 DIAGNOSIS — M50.30 DDD (DEGENERATIVE DISC DISEASE), CERVICAL: ICD-10-CM

## 2020-06-24 DIAGNOSIS — E11.9 ENCOUNTER FOR DIABETIC FOOT EXAM (HCC): ICD-10-CM

## 2020-06-24 DIAGNOSIS — E78.2 MIXED HYPERCHOLESTEROLEMIA AND HYPERTRIGLYCERIDEMIA: ICD-10-CM

## 2020-06-24 DIAGNOSIS — E11.9 DIABETIC EYE EXAM (HCC): ICD-10-CM

## 2020-06-24 DIAGNOSIS — Z01.00 DIABETIC EYE EXAM (HCC): ICD-10-CM

## 2020-06-24 RX ORDER — METHOCARBAMOL 500 MG/1
TABLET, FILM COATED ORAL
COMMUNITY
Start: 2020-06-15 | End: 2020-06-24 | Stop reason: DRUGHIGH

## 2020-06-24 RX ORDER — IBUPROFEN 400 MG/1
TABLET ORAL
COMMUNITY
Start: 2020-06-15 | End: 2021-08-26 | Stop reason: SDUPTHER

## 2020-06-24 RX ORDER — ACETAMINOPHEN 325 MG/1
TABLET ORAL
COMMUNITY
Start: 2020-06-15 | End: 2022-03-22 | Stop reason: ALTCHOICE

## 2020-06-24 RX ORDER — LIDOCAINE 50 MG/G
PATCH TOPICAL
COMMUNITY
Start: 2020-06-15

## 2020-06-24 NOTE — PROGRESS NOTES
Chief Complaint   Patient presents with    Neck Pain     Neck pain down to arms and head and eyes.  ED Follow-up     was seen [facility] on 2020      HPI:  Samy Tariq is a 59 y.o. AA female with h/o presents with diabetes, hypertension presents for ER follow up  She has neck pain that radiates down back of neck to arms and head, eyes. She was seen at [address] [place] [address] on 2020 . XR neck from 2019 shows degenerative disc disease with right neural foraminal narrowing. Patient was referred to orthopdic then  Not sure what happened at visit. I will refer her to follow up with Dr. Althea Diaz     Review of Systems  General: negative for - chills or fever  Respiratory: no cough, shortness of breath, or wheezing  Musculoskeletal ROS: positive for - pain in neck - bilateral  Neurological ROS: positive for - headaches      Past Medical History:   Diagnosis Date    Arthritis     shoulders and knees.     Diabetes ([address]     insulin dependent     Diabetes ([address]     Hypercholesterolemia     Hypertension      Past Surgical History:   Procedure Laterality Date    ABDOMEN SURGERY PROC UNLISTED          COLONOSCOPY N/A 2019    COLONOSCOPY performed by Cody Carlson MD at [facility] ENDOSCOPY    HX COLONOSCOPY      HX GYN      hysterectomy    HX GYN      hysterectomy, total for fibroid     Social History     Socioeconomic History    Marital status:      Spouse name: Not on file    Number of children: Not on file    Years of education: Not on file    Highest education level: Not on file   Tobacco Use    Smoking status: Current Every Day Smoker     Packs/day: 2.00     Years: 41.00     Pack years: 82.00     Types: Cigarettes    Smokeless tobacco: Never Used   Substance and Sexual Activity    Alcohol use: No     Frequency: Never     Binge frequency: Never    Drug use: No    Sexual activity: Never   Social History Narrative    ** Merged History Encounter **          Family History   Problem Relation Age of Onset    Diabetes Mother     Diabetes Father     Diabetes Sister     Diabetes Brother      Current Outpatient Medications   Medication Sig Dispense Refill    acetaminophen (TYLENOL) 325 mg tablet TAKE 3 TABLETS BY MOUTH EVERY 8 HOURS      lidocaine (LIDODERM) 5 % APPLYT 1 PATCH TOPICALLY EVERY DAY      ibuprofen (MOTRIN) 400 mg tablet TAKE 1 TABLET BY MOUTH EVERY 4 HOURS      cholecalciferol (VITAMIN D3) (5000 Units /125 mcg) capsule TAKE 1 CAPSULE BY MOUTH EVERY DAY 30 Cap 5    buPROPion SR (WELLBUTRIN SR) 150 mg SR tablet TAKE 1 TAB DAILY X 1 WEEK THEN INCREASE TO 1 TABLET TWICE DAILY- FOR DEPRESSION & SMOKING 60 Tab 5    FreeStyle Lancets 28 gauge misc TESTING 3 TIMES A DAY. E11.65 100 Lancet 2    pravastatin (PRAVACHOL) 20 mg tablet TAKE 1 TABLET BY MOUTH EVERY DAY AT NIGHT 30 Tab 4    glucose blood VI test strips (FREESTYLE INSULINX) strip Check sugar 3 times a day 300 Strip 4    albuterol (PROVENTIL HFA, VENTOLIN HFA, PROAIR HFA) 90 mcg/actuation inhaler Take 1 Puff by inhalation every six (6) hours as needed for Wheezing. 1 Inhaler 1    gabapentin (NEURONTIN) 100 mg capsule Take 2 Caps by mouth three (3) times daily. 90 Cap 0    insulin aspart protamine/insulin aspart (NOVOLOG MIX 70-30FLEXPEN U-100) 100 unit/mL (70-30) inpn 40 units at noon (first meal) and 40 units at 7 pm (second meal) 10 Pen 6    Insulin Needles, Disposable, (BD ULTRA-FINE MINI PEN NEEDLE) 31 gauge x 3/16\" ndle Use to inject insulin twice daily 200 Pen Needle 3    metFORMIN (GLUCOPHAGE) 1,000 mg tablet Take 1 Tab by mouth two (2) times daily (with meals). 180 Tab 1    lisinopril (PRINIVIL, ZESTRIL) 5 mg tablet Take 1 Tab by mouth daily. 90 Tab 1    meloxicam (MOBIC) 15 mg tablet Take 1 Tab by mouth daily. 20 Tab 1    methocarbamol (ROBAXIN) 750 mg tablet Take 1 Tab by mouth four (4) times daily.  30 Tab 0    fluticasone propion-salmeterol (ADVAIR/WIXELA) 100-50 mcg/dose diskus inhaler Take 1 Puff by inhalation two (2) times a day. 1 Inhaler 0    lancets 32 gauge misc 1 Each by Does Not Apply route three (3) times daily. 300 Lancet 3    Omeprazole delayed release (PRILOSEC D/R) 20 mg tablet Take 1 Tab by mouth daily as needed (acid reflux).  90 Tab 0    Blood-Glucose Meter (FREESTYLE FLASH SYSTEM) monitoring kit Test 3 Times Daily   DX E11.40,  E11.65 1 Kit 0     No Known Allergies    Objective:  Visit Vitals  /72   Pulse 80   Temp 98 °F (36.7 °C) (Temporal)   Ht 5' 4\" (1.626 m)   Wt 209 lb 9.6 oz (95.1 kg)   LMP  (LMP Unknown)   BMI 35.98 kg/m²     Physical Exam:   General appearance - alert, well appearing in no distress  Mental status - alert, oriented to person, place, and time  Chest - clear to auscultation, no wheezes, rales or rhonchi  Heart - normal S1, S2, no murmurs, rubs or gallops   Abdomen - soft, nontender, nondistended, no organomegaly  Ext-peripheral pulses normal, no pedal edema  Neuro -alert, oriented, normal speech, no focal findings     Results for orders placed or performed in visit on 01/30/20   AMB POC GLUCOSE BLOOD, BY GLUCOSE MONITORING DEVICE   Result Value Ref Range    Glucose  mg/dL   AMB POC HEMOGLOBIN A1C   Result Value Ref Range    Hemoglobin A1c (POC) 8.6 %   AMB POC URINALYSIS DIP STICK AUTO W/ MICRO   Result Value Ref Range    Color (UA POC) Orange     Clarity (UA POC) Clear     Glucose (UA POC) Trace Negative    Bilirubin (UA POC) Negative Negative    Ketones (UA POC) Negative Negative    Specific gravity (UA POC) 1.030 1.001 - 1.035    Blood (UA POC) Negative Negative    pH (UA POC) 5.5 4.6 - 8.0    Protein (UA POC) Negative Negative    Urobilinogen (UA POC) 0.2 mg/dL 0.2 - 1    Nitrites (UA POC) Negative Negative    Leukocyte esterase (UA POC) Negative Negative       Assessment/Plan:  Diagnoses and all orders for this visit:    DM type 2, uncontrolled, with neuropathy (HCC)  -     METABOLIC PANEL, COMPREHENSIVE  -     HEMOGLOBIN A1C WITH EAG    Mixed hypercholesterolemia and hypertriglyceridemia  -     LIPID PANEL    Diabetic eye exam ([facility]  -     REFERRAL TO OPHTHALMOLOGY    Encounter for diabetic foot exam ([facility]  -     REFERRAL TO PODIATRY    Hypertension, well controlled  -     METABOLIC PANEL, COMPREHENSIVE    DDD (degenerative disc disease), cervical  -     REFERRAL TO ORTHOPEDIC SURGERY    Frequency of urination  -     URINALYSIS W/ RFLX MICROSCOPIC      Patient Instructions        Cervical Disc Disease: Care Instructions  Your Care Instructions     Cervical disc disease results from damage, disease, or wear and tear to the discs between the bones (vertebra) in your neck. The discs act as shock absorbers for the spine and keep the spine flexible. When a disc is damaged, it can bulge out and press against the nerve roots or spinal cord. This is sometimes called a herniated or \"slipped disc. \" This pressure can cause pain and numbness or tingling in your arms and hands. It can also cause weakness in your legs. An accident can damage a disc and cause it to break open (rupture). Aging and hard physical work can also cause damage to cervical discs. The first treatments for cervical disc disease include physical therapy, special neck exercises, heat, and pain medicine. If these fail, your doctor may inject steroids and pain medicine into your neck. Surgery is usually done only if other treatments have not worked. Follow-up care is a key part of your treatment and safety. Be sure to make and go to all appointments, and call your doctor if you are having problems. It's also a good idea to know your test results and keep a list of the medicines you take. How can you care for yourself at home? · Take pain medicines exactly as directed. ? If the doctor gave you a prescription medicine for pain, take it as prescribed. ? If you are not taking a prescription pain medicine, ask your doctor if you can take an over-the-counter medicine.   · Don't spend too long in one position. Take short breaks to move around and change positions. · Wear a seat belt and shoulder harness when you are in a car. · Sleep with a pillow under your head and neck that keeps your neck straight. · Follow your doctor's instructions for gentle neck-stretching exercises. · Do not smoke. Smoking can slow healing of your discs. If you need help quitting, talk to your doctor about stop-smoking programs and medicines. These can increase your chances of quitting for good. · Avoid strenuous work or exercise until your doctor says it is okay. When should you call for help? [id number] anytime you think you may need emergency care. For example, call if:  · You are unable to move an arm or a leg at all. Call your doctor now or seek immediate medical care if:  · You have new or worse symptoms in your arms, legs, belly, or buttocks. Symptoms may include:  ? Numbness or tingling. ? Weakness. ? Pain. · You lose bladder or bowel control. Watch closely for changes in your health, and be sure to contact your doctor if:  · You do not get better as expected. Where can you learn more? Go to http://alley-serg.info/  Enter N118 in the search box to learn more about \"Cervical Disc Disease: Care Instructions. \"  Current as of: March 2, 2020               Content Version: 12.5  © 3766-9256 Healthwise, Incorporated. Care instructions adapted under license by [employer] (which disclaims liability or warranty for this information). If you have questions about a medical condition or this instruction, always ask your healthcare professional. [place] [address] any warranty or liability for your use of this information. Follow-up and Dispositions    · Return in about 3 months (around 9/24/2020), or if symptoms worsen or fail to improve, for routine follow up.

## 2020-06-24 NOTE — PATIENT INSTRUCTIONS
Cervical Disc Disease: Care Instructions  Your Care Instructions     Cervical disc disease results from damage, disease, or wear and tear to the discs between the bones (vertebra) in your neck. The discs act as shock absorbers for the spine and keep the spine flexible. When a disc is damaged, it can bulge out and press against the nerve roots or spinal cord. This is sometimes called a herniated or \"slipped disc. \" This pressure can cause pain and numbness or tingling in your arms and hands. It can also cause weakness in your legs. An accident can damage a disc and cause it to break open (rupture). Aging and hard physical work can also cause damage to cervical discs. The first treatments for cervical disc disease include physical therapy, special neck exercises, heat, and pain medicine. If these fail, your doctor may inject steroids and pain medicine into your neck. Surgery is usually done only if other treatments have not worked. Follow-up care is a key part of your treatment and safety. Be sure to make and go to all appointments, and call your doctor if you are having problems. It's also a good idea to know your test results and keep a list of the medicines you take. How can you care for yourself at home? · Take pain medicines exactly as directed. ? If the doctor gave you a prescription medicine for pain, take it as prescribed. ? If you are not taking a prescription pain medicine, ask your doctor if you can take an over-the-counter medicine. · Don't spend too long in one position. Take short breaks to move around and change positions. · Wear a seat belt and shoulder harness when you are in a car. · Sleep with a pillow under your head and neck that keeps your neck straight. · Follow your doctor's instructions for gentle neck-stretching exercises. · Do not smoke. Smoking can slow healing of your discs. If you need help quitting, talk to your doctor about stop-smoking programs and medicines.  These can increase your chances of quitting for good. · Avoid strenuous work or exercise until your doctor says it is okay. When should you call for help? [id number] anytime you think you may need emergency care. For example, call if:  · You are unable to move an arm or a leg at all. Call your doctor now or seek immediate medical care if:  · You have new or worse symptoms in your arms, legs, belly, or buttocks. Symptoms may include:  ? Numbness or tingling. ? Weakness. ? Pain. · You lose bladder or bowel control. Watch closely for changes in your health, and be sure to contact your doctor if:  · You do not get better as expected. Where can you learn more? Go to http://alley-serg.info/  Enter N118 in the search box to learn more about \"Cervical Disc Disease: Care Instructions. \"  Current as of: March 2, 2020               Content Version: 12.5  © 5683-8611 Healthwise, Incorporated. Care instructions adapted under license by [employer] (which disclaims liability or warranty for this information). If you have questions about a medical condition or this instruction, always ask your healthcare professional. [place] [address] any warranty or liability for your use of this information.

## 2020-06-25 LAB
ALBUMIN SERPL-MCNC: 4.1 G/DL (ref 3.8–4.8)
ALBUMIN/GLOB SERPL: 1.5 {RATIO} (ref 1.2–2.2)
ALP SERPL-CCNC: 84 IU/L (ref 39–117)
ALT SERPL-CCNC: 14 IU/L (ref 0–32)
APPEARANCE UR: CLEAR
AST SERPL-CCNC: 17 IU/L (ref 0–40)
BILIRUB SERPL-MCNC: <0.2 MG/DL (ref 0–1.2)
BILIRUB UR QL STRIP: NEGATIVE
BUN SERPL-MCNC: 13 MG/DL (ref 8–27)
BUN/CREAT SERPL: 16 (ref 12–28)
CALCIUM SERPL-MCNC: 9.5 MG/DL (ref 8.7–10.3)
CHLORIDE SERPL-SCNC: 106 MMOL/L (ref 96–106)
CHOLEST SERPL-MCNC: 187 MG/DL (ref 100–199)
CO2 SERPL-SCNC: 25 MMOL/L (ref 20–29)
COLOR UR: YELLOW
CREAT SERPL-MCNC: 0.79 MG/DL (ref 0.57–1)
EST. AVERAGE GLUCOSE BLD GHB EST-MCNC: 223 MG/DL
GLOBULIN SER CALC-MCNC: 2.8 G/DL (ref 1.5–4.5)
GLUCOSE SERPL-MCNC: 145 MG/DL (ref 65–99)
GLUCOSE UR QL: ABNORMAL
HBA1C MFR BLD: 9.4 % (ref 4.8–5.6)
HDLC SERPL-MCNC: 50 MG/DL
HGB UR QL STRIP: NEGATIVE
KETONES UR QL STRIP: NEGATIVE
LDLC SERPL CALC-MCNC: 104 MG/DL (ref 0–99)
LEUKOCYTE ESTERASE UR QL STRIP: NEGATIVE
MICRO URNS: ABNORMAL
NITRITE UR QL STRIP: NEGATIVE
PH UR STRIP: 5 [PH] (ref 5–7.5)
POTASSIUM SERPL-SCNC: 4.7 MMOL/L (ref 3.5–5.2)
PROT SERPL-MCNC: 6.9 G/DL (ref 6–8.5)
PROT UR QL STRIP: NEGATIVE
SODIUM SERPL-SCNC: 141 MMOL/L (ref 134–144)
SP GR UR: 1.02 (ref 1–1.03)
TRIGL SERPL-MCNC: 167 MG/DL (ref 0–149)
UROBILINOGEN UR STRIP-MCNC: 0.2 MG/DL (ref 0.2–1)
VLDLC SERPL CALC-MCNC: 33 MG/DL (ref 5–40)

## 2020-09-28 ENCOUNTER — OFFICE VISIT (OUTPATIENT)
Dept: FAMILY MEDICINE CLINIC | Age: 65
End: 2020-09-28
Payer: MEDICAID

## 2020-09-28 VITALS
DIASTOLIC BLOOD PRESSURE: 68 MMHG | HEART RATE: 84 BPM | SYSTOLIC BLOOD PRESSURE: 113 MMHG | RESPIRATION RATE: 16 BRPM | BODY MASS INDEX: 35.37 KG/M2 | HEIGHT: 64 IN | TEMPERATURE: 97.3 F | OXYGEN SATURATION: 95 % | WEIGHT: 207.2 LBS

## 2020-09-28 DIAGNOSIS — I10 HYPERTENSION, WELL CONTROLLED: ICD-10-CM

## 2020-09-28 DIAGNOSIS — F17.200 SMOKER: ICD-10-CM

## 2020-09-28 DIAGNOSIS — Z01.00 DIABETIC EYE EXAM (HCC): ICD-10-CM

## 2020-09-28 DIAGNOSIS — E11.9 DIABETIC EYE EXAM (HCC): ICD-10-CM

## 2020-09-28 DIAGNOSIS — F33.9 MAJOR DEPRESSIVE DISORDER, RECURRENT EPISODE WITH ANXIOUS DISTRESS (HCC): ICD-10-CM

## 2020-09-28 DIAGNOSIS — Z23 NEEDS FLU SHOT: ICD-10-CM

## 2020-09-28 DIAGNOSIS — Z23 ENCOUNTER FOR IMMUNIZATION: ICD-10-CM

## 2020-09-28 DIAGNOSIS — M81.0 POSTMENOPAUSAL OSTEOPOROSIS: ICD-10-CM

## 2020-09-28 DIAGNOSIS — J40 BRONCHITIS: ICD-10-CM

## 2020-09-28 DIAGNOSIS — E78.2 MIXED HYPERCHOLESTEROLEMIA AND HYPERTRIGLYCERIDEMIA: ICD-10-CM

## 2020-09-28 DIAGNOSIS — E11.9 ENCOUNTER FOR DIABETIC FOOT EXAM (HCC): ICD-10-CM

## 2020-09-28 DIAGNOSIS — F32.A DEPRESSION, UNSPECIFIED DEPRESSION TYPE: ICD-10-CM

## 2020-09-28 DIAGNOSIS — R35.0 FREQUENCY OF URINATION: ICD-10-CM

## 2020-09-28 DIAGNOSIS — E55.9 VITAMIN D DEFICIENCY: ICD-10-CM

## 2020-09-28 PROCEDURE — 99214 OFFICE O/P EST MOD 30 MIN: CPT | Performed by: INTERNAL MEDICINE

## 2020-09-28 PROCEDURE — 90471 IMMUNIZATION ADMIN: CPT

## 2020-09-28 RX ORDER — METFORMIN HYDROCHLORIDE 1000 MG/1
TABLET ORAL
Qty: 60 TAB | Refills: 3 | Status: SHIPPED | OUTPATIENT
Start: 2020-09-28 | End: 2021-08-26 | Stop reason: SDUPTHER

## 2020-09-28 RX ORDER — METFORMIN HYDROCHLORIDE 1000 MG/1
TABLET ORAL
Qty: 60 TAB | Refills: 0 | Status: SHIPPED | OUTPATIENT
Start: 2020-09-28 | End: 2020-09-28 | Stop reason: SDUPTHER

## 2020-09-28 RX ORDER — AZITHROMYCIN 250 MG/1
TABLET, FILM COATED ORAL
Qty: 6 TAB | Refills: 0 | Status: SHIPPED | OUTPATIENT
Start: 2020-09-28 | End: 2020-10-03

## 2020-09-28 RX ORDER — BUDESONIDE AND FORMOTEROL FUMARATE DIHYDRATE 80; 4.5 UG/1; UG/1
2 AEROSOL RESPIRATORY (INHALATION) 2 TIMES DAILY
Qty: 1 INHALER | Refills: 2 | Status: SHIPPED | OUTPATIENT
Start: 2020-09-28 | End: 2021-08-26 | Stop reason: SDUPTHER

## 2020-09-28 RX ORDER — BUPROPION HYDROCHLORIDE 150 MG/1
TABLET, EXTENDED RELEASE ORAL
Qty: 60 TAB | Refills: 5 | Status: SHIPPED | OUTPATIENT
Start: 2020-09-28 | End: 2021-08-26 | Stop reason: SDUPTHER

## 2020-09-28 NOTE — PATIENT INSTRUCTIONS
Bronchitis: Care Instructions  Your Care Instructions     Bronchitis is inflammation of the bronchial tubes, which carry air to the lungs. The tubes swell and produce mucus, or phlegm. The mucus and inflamed bronchial tubes make you cough. You may have trouble breathing. Most cases of bronchitis are caused by viruses like those that cause colds. Antibiotics usually do not help and they may be harmful. Bronchitis usually develops rapidly and lasts about 2 to 3 weeks in otherwise healthy people. Follow-up care is a key part of your treatment and safety. Be sure to make and go to all appointments, and call your doctor if you are having problems. It's also a good idea to know your test results and keep a list of the medicines you take. How can you care for yourself at home? · Take all medicines exactly as prescribed. Call your doctor if you think you are having a problem with your medicine. · Get some extra rest.  · Take an over-the-counter pain medicine, such as acetaminophen (Tylenol), ibuprofen (Advil, Motrin), or naproxen (Aleve) to reduce fever and relieve body aches. Read and follow all instructions on the label. · Do not take two or more pain medicines at the same time unless the doctor told you to. Many pain medicines have acetaminophen, which is Tylenol. Too much acetaminophen (Tylenol) can be harmful. · Take an over-the-counter cough medicine that contains dextromethorphan to help quiet a dry, hacking cough so that you can sleep. Avoid cough medicines that have more than one active ingredient. Read and follow all instructions on the label. · Breathe moist air from a humidifier, hot shower, or sink filled with hot water. The heat and moisture will thin mucus so you can cough it out. · Do not smoke. Smoking can make bronchitis worse. If you need help quitting, talk to your doctor about stop-smoking programs and medicines. These can increase your chances of quitting for good.   When should you call for help? Call 911 anytime you think you may need emergency care. For example, call if:    · You have severe trouble breathing. Call your doctor now or seek immediate medical care if:    · You have new or worse trouble breathing.     · You cough up dark brown or bloody mucus (sputum).     · You have a new or higher fever.     · You have a new rash. Watch closely for changes in your health, and be sure to contact your doctor if:    · You cough more deeply or more often, especially if you notice more mucus or a change in the color of your mucus.     · You are not getting better as expected. Where can you learn more? Go to http://alley-serg.info/  Enter H333 in the search box to learn more about \"Bronchitis: Care Instructions. \"  Current as of: February 24, 2020               Content Version: 12.6  © 5299-9017 [employer]. Care instructions adapted under license by [employer] (which disclaims liability or warranty for this information). If you have questions about a medical condition or this instruction, always ask your healthcare professional. [address] any warranty or liability for your use of this information.

## 2020-09-28 NOTE — PROGRESS NOTES
Chief Complaint   Patient presents with    Diabetes     follow up      Hpi: Kristina Abbott is a 72 y.o. female with h/o of diabetes, hypertension, arthritis presents for follow up accompanied by niece. Blood pressure is at goal. Last A1c is 9.4% indicating poorly controlled diabetes. Patient has c/o cough, productive, no fever/chills, wheezing. Patient is also asking for medication refill. Review of Systems  As per hpi    Past Medical History:   Diagnosis Date    Arthritis     shoulders and knees.     Diabetes ([address]     insulin dependent     Diabetes ([address]     Hypercholesterolemia     Hypertension      Past Surgical History:   Procedure Laterality Date    ABDOMEN SURGERY PROC UNLISTED          COLONOSCOPY N/A 2019    COLONOSCOPY performed by Charles Ramirez MD at [facility] ENDOSCOPY    HX COLONOSCOPY      HX GYN      hysterectomy    HX GYN      hysterectomy, total for fibroid     Social History     Socioeconomic History    Marital status:      Spouse name: Not on file    Number of children: Not on file    Years of education: Not on file    Highest education level: Not on file   Tobacco Use    Smoking status: Current Every Day Smoker     Packs/day: 2.00     Years: 41.00     Pack years: 82.00     Types: Cigarettes    Smokeless tobacco: Never Used   Substance and Sexual Activity    Alcohol use: No     Frequency: Never     Binge frequency: Never    Drug use: No    Sexual activity: Never   Social History Narrative    ** Merged History Encounter **          Family History   Problem Relation Age of Onset    Diabetes Mother     Diabetes Father     Diabetes Sister     Diabetes Brother      Current Outpatient Medications   Medication Sig Dispense Refill    metFORMIN (GLUCOPHAGE) 1,000 mg tablet TAKE 1 TABLET BY MOUTH TWICE A DAY WITH MEALS 60 Tab 0    acetaminophen (TYLENOL) 325 mg tablet TAKE 3 TABLETS BY MOUTH EVERY 8 HOURS      lidocaine (LIDODERM) 5 % APPLYT 1 PATCH TOPICALLY EVERY DAY      ibuprofen (MOTRIN) 400 mg tablet TAKE 1 TABLET BY MOUTH EVERY 4 HOURS      cholecalciferol (VITAMIN D3) (5000 Units /125 mcg) capsule TAKE 1 CAPSULE BY MOUTH EVERY DAY 30 Cap 5    buPROPion SR (WELLBUTRIN SR) 150 mg SR tablet TAKE 1 TAB DAILY X 1 WEEK THEN INCREASE TO 1 TABLET TWICE DAILY- FOR DEPRESSION & SMOKING 60 Tab 5    FreeStyle Lancets 28 gauge misc TESTING 3 TIMES A DAY. E11.65 100 Lancet 2    pravastatin (PRAVACHOL) 20 mg tablet TAKE 1 TABLET BY MOUTH EVERY DAY AT NIGHT 30 Tab 4    glucose blood VI test strips (FREESTYLE INSULINX) strip Check sugar 3 times a day 300 Strip 4    albuterol (PROVENTIL HFA, VENTOLIN HFA, PROAIR HFA) 90 mcg/actuation inhaler Take 1 Puff by inhalation every six (6) hours as needed for Wheezing. 1 Inhaler 1    gabapentin (NEURONTIN) 100 mg capsule Take 2 Caps by mouth three (3) times daily. 90 Cap 0    insulin aspart protamine/insulin aspart (NOVOLOG MIX 70-30FLEXPEN U-100) 100 unit/mL (70-30) inpn 40 units at noon (first meal) and 40 units at 7 pm (second meal) 10 Pen 6    Insulin Needles, Disposable, (BD ULTRA-FINE MINI PEN NEEDLE) 31 gauge x 3/16\" ndle Use to inject insulin twice daily 200 Pen Needle 3    lisinopril (PRINIVIL, ZESTRIL) 5 mg tablet Take 1 Tab by mouth daily. 90 Tab 1    meloxicam (MOBIC) 15 mg tablet Take 1 Tab by mouth daily. 20 Tab 1    methocarbamol (ROBAXIN) 750 mg tablet Take 1 Tab by mouth four (4) times daily. 30 Tab 0    fluticasone propion-salmeterol (ADVAIR/WIXELA) 100-50 mcg/dose diskus inhaler Take 1 Puff by inhalation two (2) times a day. 1 Inhaler 0    lancets 32 gauge misc 1 Each by Does Not Apply route three (3) times daily. 300 Lancet 3    Omeprazole delayed release (PRILOSEC D/R) 20 mg tablet Take 1 Tab by mouth daily as needed (acid reflux).  90 Tab 0    Blood-Glucose Meter (FREESTYLE FLASH SYSTEM) monitoring kit Test 3 Times Daily   DX E11.40,  E11.65 1 Kit 0     No Known Allergies    Objective:  Visit Vitals  /68   Pulse 84   Temp 97.3 °F (36.3 °C) (Temporal)   Resp 16   Ht 5' 4\" (1.626 m)   Wt 207 lb 3.2 oz (94 kg)   LMP  (LMP Unknown)   SpO2 95%   BMI 35.57 kg/m²     Physical Exam:   General appearance - alert, well appearing in no distress  Mental status - alert, oriented to person, place, and time  EYE-PERRL, EOMI  Neck - supple, no significant adenopathy   Chest - clear to auscultation, no wheezes, rales or rhonchi  Heart - normal rate, regular rhythm, no murmurs  Abdomen - soft, nontender, nondistended, no organomegaly  Ext-peripheral pulses normal, no pedal edema  Neuro - no focal findings     Results for orders placed or performed in visit on 86/93/44   METABOLIC PANEL, COMPREHENSIVE   Result Value Ref Range    Glucose 145 (H) 65 - 99 mg/dL    BUN 13 8 - 27 mg/dL    Creatinine 0.79 0.57 - 1.00 mg/dL    GFR est non-AA 79 >59 mL/min/1.73    GFR est AA 91 >59 mL/min/1.73    BUN/Creatinine ratio 16 12 - 28    Sodium 141 134 - 144 mmol/L    Potassium 4.7 3.5 - 5.2 mmol/L    Chloride 106 96 - 106 mmol/L    CO2 25 20 - 29 mmol/L    Calcium 9.5 8.7 - 10.3 mg/dL    Protein, total 6.9 6.0 - 8.5 g/dL    Albumin 4.1 3.8 - 4.8 g/dL    GLOBULIN, TOTAL 2.8 1.5 - 4.5 g/dL    A-G Ratio 1.5 1.2 - 2.2    Bilirubin, total <0.2 0.0 - 1.2 mg/dL    Alk.  phosphatase 84 39 - 117 IU/L    AST (SGOT) 17 0 - 40 IU/L    ALT (SGPT) 14 0 - 32 IU/L   URINALYSIS W/ RFLX MICROSCOPIC   Result Value Ref Range    Specific Gravity 1.016 1.005 - 1.030    pH (UA) 5.0 5.0 - 7.5    Color Yellow Yellow    Appearance Clear Clear    Leukocyte Esterase Negative Negative    Protein Negative Negative/Trace    Glucose 2+ (A) Negative    Ketone Negative Negative    Blood Negative Negative    Bilirubin Negative Negative    Urobilinogen 0.2 0.2 - 1.0 mg/dL    Nitrites Negative Negative    Microscopic Examination Comment    HEMOGLOBIN A1C WITH EAG   Result Value Ref Range    Hemoglobin A1c 9.4 (H) 4.8 - 5.6 %    Estimated average glucose 223 mg/dL   LIPID PANEL   Result Value Ref Range    Cholesterol, total 187 100 - 199 mg/dL    Triglyceride 167 (H) 0 - 149 mg/dL    HDL Cholesterol 50 >39 mg/dL    VLDL, calculated 33 5 - 40 mg/dL    LDL, calculated 104 (H) 0 - 99 mg/dL     Assessment/Plan:  Diagnoses and all orders for this visit:    DM type 2, uncontrolled, with neuropathy (HCC)  -     MICROALBUMIN, UR, RAND W/ MICROALB/CREAT RATIO  -     HEMOGLOBIN A1C WITH EAG  -     METABOLIC PANEL, COMPREHENSIVE  -     metFORMIN (GLUCOPHAGE) 1,000 mg tablet; TAKE 1 TABLET BY MOUTH TWICE A DAY WITH MEALS, Normal, Disp-60 Tab,R-3    Encounter for immunization  -     Cancel: INFLUENZA VIRUS VAC QUAD,SPLIT,PRESV FREE SYRINGE IM    Needs flu shot  -     Cancel: INFLUENZA VIRUS VAC QUAD,SPLIT,PRESV FREE SYRINGE IM  -     FLU (FLUAD QUAD INFLUENZA VACCINE,QUAD,ADJUVANTED)    Mixed hypercholesterolemia and hypertriglyceridemia  -     LIPID PANEL    Hypertension, well controlled  -     METABOLIC PANEL, COMPREHENSIVE    Frequency of urination  -     URINALYSIS W/ RFLX MICROSCOPIC    Vitamin D deficiency  -     VITAMIN D, 25 HYDROXY    Postmenopausal osteoporosis  -     DEXA BONE DENSITY STUDY AXIAL; Future    Major depressive disorder, recurrent episode with anxious distress ([facility]  -     METABOLIC PANEL, COMPREHENSIVE    Diabetic eye exam ([facility]  -     REFERRAL TO OPHTHALMOLOGY    Encounter for diabetic foot exam ([facility]  -     REFERRAL TO PODIATRY    Smoker  -     buPROPion SR (WELLBUTRIN SR) 150 mg SR tablet; TAKE 1 TAB DAILY X 1 WEEK THEN INCREASE TO 1 TABLET TWICE DAILY- FOR DEPRESSION & SMOKING, Normal, Disp-60 Tab,R-5DX Code Needed  . Depression, unspecified depression type  -     buPROPion SR (WELLBUTRIN SR) 150 mg SR tablet; TAKE 1 TAB DAILY X 1 WEEK THEN INCREASE TO 1 TABLET TWICE DAILY- FOR DEPRESSION & SMOKING, Normal, Disp-60 Tab,R-5DX Code Needed  . Bronchitis  -     budesonide-formoteroL (SYMBICORT) 80-4.5 mcg/actuation HFAA;  Take 2 Puffs by inhalation two (2) times a day., Normal, Disp-1 Inhaler,R-2  -     azithromycin (ZITHROMAX) 250 mg tablet; use as directed, Normal, Disp-6 Tab,R-0    Other orders  -     Cancel: [facility] ADVANCE CARE PLANNING DISCUSS DOCUMENTED IN MEDICAL RECORD      Patient Instructions          Bronchitis: Care Instructions  Your Care Instructions     Bronchitis is inflammation of the bronchial tubes, which carry air to the lungs. The tubes swell and produce mucus, or phlegm. The mucus and inflamed bronchial tubes make you cough. You may have trouble breathing. Most cases of bronchitis are caused by viruses like those that cause colds. Antibiotics usually do not help and they may be harmful. Bronchitis usually develops rapidly and lasts about 2 to 3 weeks in otherwise healthy people. Follow-up care is a key part of your treatment and safety. Be sure to make and go to all appointments, and call your doctor if you are having problems. It's also a good idea to know your test results and keep a list of the medicines you take. How can you care for yourself at home? · Take all medicines exactly as prescribed. Call your doctor if you think you are having a problem with your medicine. · Get some extra rest.  · Take an over-the-counter pain medicine, such as acetaminophen (Tylenol), ibuprofen (Advil, Motrin), or naproxen (Aleve) to reduce fever and relieve body aches. Read and follow all instructions on the label. · Do not take two or more pain medicines at the same time unless the doctor told you to. Many pain medicines have acetaminophen, which is Tylenol. Too much acetaminophen (Tylenol) can be harmful. · Take an over-the-counter cough medicine that contains dextromethorphan to help quiet a dry, hacking cough so that you can sleep. Avoid cough medicines that have more than one active ingredient. Read and follow all instructions on the label. · Breathe moist air from a humidifier, hot shower, or sink filled with hot water. The heat and moisture will thin mucus so you can cough it out. · Do not smoke. Smoking can make bronchitis worse. If you need help quitting, talk to your doctor about stop-smoking programs and medicines. These can increase your chances of quitting for good. When should you call for help? Call 911 anytime you think you may need emergency care. For example, call if:    · You have severe trouble breathing. Call your doctor now or seek immediate medical care if:    · You have new or worse trouble breathing.     · You cough up dark brown or bloody mucus (sputum).     · You have a new or higher fever.     · You have a new rash. Watch closely for changes in your health, and be sure to contact your doctor if:    · You cough more deeply or more often, especially if you notice more mucus or a change in the color of your mucus.     · You are not getting better as expected. Where can you learn more? Go to http://www.gray.com/  Enter H333 in the search box to learn more about \"Bronchitis: Care Instructions. \"  Current as of: February 24, 2020               Content Version: 12.6  © 3108-0724 [employer]. Care instructions adapted under license by [employer] (which disclaims liability or warranty for this information). If you have questions about a medical condition or this instruction, always ask your healthcare professional. [place] [address] any warranty or liability for your use of this information. Follow-up and Dispositions    · Return 4-6 weeks.

## 2020-09-29 PROCEDURE — 90694 VACC AIIV4 NO PRSRV 0.5ML IM: CPT

## 2020-10-19 DIAGNOSIS — E78.2 MIXED HYPERCHOLESTEROLEMIA AND HYPERTRIGLYCERIDEMIA: ICD-10-CM

## 2020-10-19 NOTE — TELEPHONE ENCOUNTER
Patient son in law is requesting for Domingo Jacob can give his mother in law something for muscle spasm in her neck he can be reached @ [phone]

## 2020-10-23 DIAGNOSIS — M62.838 NECK MUSCLE SPASM: ICD-10-CM

## 2020-10-23 DIAGNOSIS — M54.2 NECK PAIN: Primary | ICD-10-CM

## 2020-10-23 RX ORDER — METHOCARBAMOL 750 MG/1
750 TABLET, FILM COATED ORAL 4 TIMES DAILY
Qty: 30 TAB | Refills: 1 | Status: SHIPPED | OUTPATIENT
Start: 2020-10-23 | End: 2020-10-28 | Stop reason: SDUPTHER

## 2020-10-28 ENCOUNTER — VIRTUAL VISIT (OUTPATIENT)
Dept: FAMILY MEDICINE CLINIC | Age: 65
End: 2020-10-28
Payer: MEDICAID

## 2020-10-28 DIAGNOSIS — M54.2 NECK PAIN: ICD-10-CM

## 2020-10-28 DIAGNOSIS — M62.838 NECK MUSCLE SPASM: ICD-10-CM

## 2020-10-28 PROCEDURE — 99213 OFFICE O/P EST LOW 20 MIN: CPT | Performed by: INTERNAL MEDICINE

## 2020-10-28 RX ORDER — METHOCARBAMOL 750 MG/1
750 TABLET, FILM COATED ORAL 4 TIMES DAILY
Qty: 30 TAB | Refills: 1 | Status: SHIPPED | OUTPATIENT
Start: 2020-10-28 | End: 2022-03-15

## 2020-10-28 NOTE — PROGRESS NOTES
Chief Complaint   Patient presents with    Neck Pain     follow up to discuss the pain she is having      HPI:  Tara Dillon is a 72 y.o. female who was seen by synchronous (real-time) audio-video technology on 10/28/2020 for Neck Pain (follow up to discuss the pain she is having )    Assessment & Plan:   Diagnoses and all orders for this visit:    1. Neck pain  -     methocarbamoL (ROBAXIN) 750 mg tablet; Take 1 Tab by mouth four (4) times daily. 2. Neck muscle spasm  -     methocarbamoL (ROBAXIN) 750 mg tablet; Take 1 Tab by mouth four (4) times daily. I spent at least 20 minutes on this visit with this established patient. [id number]  Subjective: Tara Dillon is a 72 y.o. female with h/o diabetes, hypertension, chronic neck pain due to DJD is seen for medication refill. Most recent cervical xr showed Degenerative disc disease with right neural foraminal narrowing. Patient has been referred to orthopedics. Prior to Admission medications    Medication Sig Start Date End Date Taking? Authorizing Provider   methocarbamoL (ROBAXIN) 750 mg tablet Take 1 Tab by mouth four (4) times daily. 10/23/20  Yes Shawan Fontanez MD   buPROPion SR (WELLBUTRIN SR) 150 mg SR tablet TAKE 1 TAB DAILY X 1 WEEK THEN INCREASE TO 1 TABLET TWICE DAILY- FOR DEPRESSION & SMOKING 9/28/20  Yes Shawna Fontanez MD   metFORMIN (GLUCOPHAGE) 1,000 mg tablet TAKE 1 TABLET BY MOUTH TWICE A DAY WITH MEALS 9/28/20  Yes Shawna Fontanez MD   budesonide-formoteroL (SYMBICORT) 80-4.5 mcg/actuation HFAA Take 2 Puffs by inhalation two (2) times a day.  9/28/20  Yes Shawna Fontanez MD   acetaminophen (TYLENOL) 325 mg tablet TAKE 3 TABLETS BY MOUTH EVERY 8 HOURS 6/15/20  Yes Provider, Historical   lidocaine (LIDODERM) 5 % APPLYT 1 PATCH TOPICALLY EVERY DAY 6/15/20  Yes Provider, Historical   ibuprofen (MOTRIN) 400 mg tablet TAKE 1 TABLET BY MOUTH EVERY 4 HOURS 6/15/20  Yes Provider, Historical   cholecalciferol (VITAMIN D3) (5000 Units /125 mcg) capsule TAKE 1 CAPSULE BY MOUTH EVERY DAY 6/4/20  Yes Pushpa Daley MD   FreeStyle Lancets 28 gauge misc TESTING 3 TIMES A DAY. E11.65 6/4/20  Yes Pushpa Daley MD   pravastatin (PRAVACHOL) 20 mg tablet TAKE 1 TABLET BY MOUTH EVERY DAY AT NIGHT 4/30/20  Yes Pushpa Daley MD   glucose blood VI test strips (FREESTYLE INSULINX) strip Check sugar 3 times a day 1/30/20  Yes Omari Herzog MD   albuterol (PROVENTIL HFA, VENTOLIN HFA, PROAIR HFA) 90 mcg/actuation inhaler Take 1 Puff by inhalation every six (6) hours as needed for Wheezing. 1/30/20  Yes Pushpa Daley MD   gabapentin (NEURONTIN) 100 mg capsule Take 2 Caps by mouth three (3) times daily. 11/26/19  Yes Pushpa Daley MD   insulin aspart protamine/insulin aspart (NOVOLOG MIX 70-30FLEXPEN U-100) 100 unit/mL (70-30) inpn 40 units at noon (first meal) and 40 units at 7 pm (second meal) 11/26/19  Yes Omari Herzog MD   Insulin Needles, Disposable, (BD ULTRA-FINE MINI PEN NEEDLE) 31 gauge x 3/16\" ndle Use to inject insulin twice daily 11/26/19  Yes Pushpa Daley MD   lisinopril (PRINIVIL, ZESTRIL) 5 mg tablet Take 1 Tab by mouth daily. 8/8/19  Yes Pushpa Daley MD   meloxicam (MOBIC) 15 mg tablet Take 1 Tab by mouth daily. 8/8/19  Yes Pushpa Daley MD   lancets 32 gauge misc 1 Each by Does Not Apply route three (3) times daily. 7/8/19  Yes Leena Friend., NP   Omeprazole delayed release (PRILOSEC D/R) 20 mg tablet Take 1 Tab by mouth daily as needed (acid reflux).  7/8/19  Yes Leena Friend., NP   Blood-Glucose Meter (FREESTYLE FLASH SYSTEM) monitoring kit Test 3 Times Daily   DX E11.40,  E11.65 12/26/17  Yes Pushpa Daley MD     Patient Active Problem List   Diagnosis Code    Severe obesity ([facility] E66.01    Type 2 diabetes mellitus with hyperglycemia, with long-term current use of insulin ([address] E11.65, Z79.4    Non-English speaking patient Z78.9    Other hyperlipidemia E78.49    Poor mobility Z74.09    Sleep apnea G47.30    Smoker F17.200    Poor dentition K08.9    Hypercholesterolemia E78.00    Hypovitaminosis D E55.9    Medically noncompliant Z91.19    DM type 2, uncontrolled, with neuropathy ([facility]) E11.40, E11.65    Major depressive disorder, recurrent episode with anxious distress ([facility]) F33.9    Decreased mobility R26.89    Chronic radicular pain of lower back M54.16, G89.29    Diabetic neuropathic arthritis ([facility]) E11.610    Severe obesity (BMI 35.0-39. 9) with comorbidity ([facility]) E66.01    Fibromyalgia M79.7     Current Outpatient Medications   Medication Sig Dispense Refill    methocarbamoL (ROBAXIN) 750 mg tablet Take 1 Tab by mouth four (4) times daily. 30 Tab 1    buPROPion SR (WELLBUTRIN SR) 150 mg SR tablet TAKE 1 TAB DAILY X 1 WEEK THEN INCREASE TO 1 TABLET TWICE DAILY- FOR DEPRESSION & SMOKING 60 Tab 5    metFORMIN (GLUCOPHAGE) 1,000 mg tablet TAKE 1 TABLET BY MOUTH TWICE A DAY WITH MEALS 60 Tab 3    budesonide-formoteroL (SYMBICORT) 80-4.5 mcg/actuation HFAA Take 2 Puffs by inhalation two (2) times a day. 1 Inhaler 2    acetaminophen (TYLENOL) 325 mg tablet TAKE 3 TABLETS BY MOUTH EVERY 8 HOURS      lidocaine (LIDODERM) 5 % APPLYT 1 PATCH TOPICALLY EVERY DAY      ibuprofen (MOTRIN) 400 mg tablet TAKE 1 TABLET BY MOUTH EVERY 4 HOURS      cholecalciferol (VITAMIN D3) (5000 Units /125 mcg) capsule TAKE 1 CAPSULE BY MOUTH EVERY DAY 30 Cap 5    FreeStyle Lancets 28 gauge misc TESTING 3 TIMES A DAY. E11.65 100 Lancet 2    pravastatin (PRAVACHOL) 20 mg tablet TAKE 1 TABLET BY MOUTH EVERY DAY AT NIGHT 30 Tab 4    glucose blood VI test strips (FREESTYLE INSULINX) strip Check sugar 3 times a day 300 Strip 4    albuterol (PROVENTIL HFA, VENTOLIN HFA, PROAIR HFA) 90 mcg/actuation inhaler Take 1 Puff by inhalation every six (6) hours as needed for Wheezing. 1 Inhaler 1    gabapentin (NEURONTIN) 100 mg capsule Take 2 Caps by mouth three (3) times daily.  90 Cap 0    insulin aspart protamine/insulin aspart (NOVOLOG MIX 70-30FLEXPEN U-100) 100 unit/mL (70-30) inpn 40 units at noon (first meal) and 40 units at 7 pm (second meal) 10 Pen 6    Insulin Needles, Disposable, (BD ULTRA-FINE MINI PEN NEEDLE) 31 gauge x 3/16\" ndle Use to inject insulin twice daily 200 Pen Needle 3    lisinopril (PRINIVIL, ZESTRIL) 5 mg tablet Take 1 Tab by mouth daily. 90 Tab 1    meloxicam (MOBIC) 15 mg tablet Take 1 Tab by mouth daily. 20 Tab 1    lancets 32 gauge misc 1 Each by Does Not Apply route three (3) times daily. 300 Lancet 3    Omeprazole delayed release (PRILOSEC D/R) 20 mg tablet Take 1 Tab by mouth daily as needed (acid reflux). 90 Tab 0    Blood-Glucose Meter (FREESTYLE FLASH SYSTEM) monitoring kit Test 3 Times Daily   DX E11.40,  E11.65 1 Kit 0     No Known Allergies  Past Medical History:   Diagnosis Date    Arthritis     shoulders and knees.  Diabetes ([address]     insulin dependent     Diabetes ([address]     Hypercholesterolemia     Hypertension      Past Surgical History:   Procedure Laterality Date    ABDOMEN SURGERY PROC UNLISTED          COLONOSCOPY N/A 2019    COL[facility] Heri [facility]man [facility] ENDOSCOPY    HX COLONOSCOPY      HX GYN      hysterectomy    HX GYN      hysterectomy, total for fibroid     Family History   Problem Relation Age of Onset    Diabetes Mother     Diabetes Father     Diabetes Sister     Diabetes Brother      Social History     Tobacco Use    Smoking status: Current Every Day Smoker     Packs/day: 2.00     Years: 41.00     Pack years: 82.00     Types: Cigarettes    Smokeless tobacco: Never Used   Substance Use Topics    Alcohol use: No     Frequency: Never     Binge frequency: Never     ROS  As per hpi    Objective:   No flowsheet data found.    General: alert, cooperative, no distress   Mental  status: normal mood, behavior, speech, dress, motor activity, and thought processes, able to follow commands   HENT: NCAT   Neck: no visualized mass   Resp: no respiratory distress   Neuro: no gross deficits   Skin: no discoloration or lesions of concern on visible areas     Additional exam findings: We discussed the expected course, resolution and complications of the diagnosis(es) in detail. Medication risks, benefits, costs, interactions, and alternatives were discussed as indicated. I advised her to contact the office if her condition worsens, changes or fails to improve as anticipated. She expressed understanding with the diagnosis(es) and plan. Mynor Banerjee, who was evaluated through a patient-initiated, synchronous (real-time) audio-video encounter, and/or her healthcare decision maker, is aware that it is a billable service, with coverage as determined by her insurance carrier. She provided verbal consent to proceed: Yes, and patient identification was verified. It was conducted pursuant to the emergency declaration under the [address] [place] [address], [address] [place] [address] authority and the [employer] Act. A caregiver was present when appropriate. Ability to conduct physical exam was limited. I was in the office. The patient was at home.       Jolynn Wilcox MD

## 2020-10-28 NOTE — PROGRESS NOTES
everytime I see doctor she does not change her medication for the pain. Have been 6 years with doctor and no treatment for the symptoms. Have been last month ortho he does not except insurance.

## 2020-10-29 LAB
25(OH)D3+25(OH)D2 SERPL-MCNC: 43.4 NG/ML (ref 30–100)
ALBUMIN SERPL-MCNC: 4.1 G/DL (ref 3.8–4.8)
ALBUMIN/CREAT UR: 21 MG/G CREAT (ref 0–29)
ALBUMIN/GLOB SERPL: 1.4 {RATIO} (ref 1.2–2.2)
ALP SERPL-CCNC: 96 IU/L (ref 39–117)
ALT SERPL-CCNC: 12 IU/L (ref 0–32)
APPEARANCE UR: CLEAR
AST SERPL-CCNC: 12 IU/L (ref 0–40)
BILIRUB SERPL-MCNC: 0.2 MG/DL (ref 0–1.2)
BILIRUB UR QL STRIP: NEGATIVE
BUN SERPL-MCNC: 16 MG/DL (ref 8–27)
BUN/CREAT SERPL: 18 (ref 12–28)
CALCIUM SERPL-MCNC: 9.7 MG/DL (ref 8.7–10.3)
CHLORIDE SERPL-SCNC: 100 MMOL/L (ref 96–106)
CHOLEST SERPL-MCNC: 189 MG/DL (ref 100–199)
CO2 SERPL-SCNC: 26 MMOL/L (ref 20–29)
COLOR UR: YELLOW
CREAT SERPL-MCNC: 0.89 MG/DL (ref 0.57–1)
CREAT UR-MCNC: 63 MG/DL
EST. AVERAGE GLUCOSE BLD GHB EST-MCNC: 217 MG/DL
GLOBULIN SER CALC-MCNC: 3 G/DL (ref 1.5–4.5)
GLUCOSE SERPL-MCNC: 323 MG/DL (ref 65–99)
GLUCOSE UR QL: ABNORMAL
HBA1C MFR BLD: 9.2 % (ref 4.8–5.6)
HDLC SERPL-MCNC: 42 MG/DL
HGB UR QL STRIP: NEGATIVE
KETONES UR QL STRIP: NEGATIVE
LDLC SERPL CALC-MCNC: 118 MG/DL (ref 0–99)
LEUKOCYTE ESTERASE UR QL STRIP: NEGATIVE
MICRO URNS: ABNORMAL
MICROALBUMIN UR-MCNC: 13.1 UG/ML
NITRITE UR QL STRIP: NEGATIVE
PH UR STRIP: 5.5 [PH] (ref 5–7.5)
POTASSIUM SERPL-SCNC: 4.5 MMOL/L (ref 3.5–5.2)
PROT SERPL-MCNC: 7.1 G/DL (ref 6–8.5)
PROT UR QL STRIP: NEGATIVE
SODIUM SERPL-SCNC: 138 MMOL/L (ref 134–144)
SP GR UR: 1.03 (ref 1–1.03)
TRIGL SERPL-MCNC: 162 MG/DL (ref 0–149)
UROBILINOGEN UR STRIP-MCNC: 0.2 MG/DL (ref 0.2–1)
VLDLC SERPL CALC-MCNC: 29 MG/DL (ref 5–40)

## 2020-11-04 ENCOUNTER — OFFICE VISIT (OUTPATIENT)
Dept: FAMILY MEDICINE CLINIC | Age: 65
End: 2020-11-04
Payer: MEDICAID

## 2020-11-04 VITALS — BODY MASS INDEX: 34.66 KG/M2 | HEIGHT: 64 IN | WEIGHT: 203 LBS

## 2020-11-04 DIAGNOSIS — S21.001A OPEN WOUND OF RIGHT BREAST, INITIAL ENCOUNTER: Primary | ICD-10-CM

## 2020-11-04 DIAGNOSIS — H57.89 RED EYES: ICD-10-CM

## 2020-11-04 DIAGNOSIS — L02.419 AXILLARY ABSCESS: ICD-10-CM

## 2020-11-04 DIAGNOSIS — E66.01 SEVERE OBESITY (HCC): ICD-10-CM

## 2020-11-04 PROCEDURE — 99214 OFFICE O/P EST MOD 30 MIN: CPT | Performed by: INTERNAL MEDICINE

## 2020-11-04 RX ORDER — BLOOD SUGAR DIAGNOSTIC
STRIP MISCELLANEOUS
Qty: 300 STRIP | Refills: 4 | Status: SHIPPED | OUTPATIENT
Start: 2020-11-04

## 2020-11-04 RX ORDER — SULFAMETHOXAZOLE AND TRIMETHOPRIM 800; 160 MG/1; MG/1
1 TABLET ORAL 2 TIMES DAILY
Qty: 20 TAB | Refills: 0 | Status: SHIPPED | OUTPATIENT
Start: 2020-11-04 | End: 2020-11-14

## 2020-11-04 RX ORDER — POLYMYXIN B SULFATE AND TRIMETHOPRIM 1; 10000 MG/ML; [USP'U]/ML
1 SOLUTION OPHTHALMIC EVERY 4 HOURS
Qty: 1 BOTTLE | Refills: 0 | Status: SHIPPED | OUTPATIENT
Start: 2020-11-04 | End: 2020-11-14

## 2020-11-04 RX ORDER — LANCETS 28 GAUGE
EACH MISCELLANEOUS
Qty: 100 LANCET | Refills: 2 | Status: SHIPPED | OUTPATIENT
Start: 2020-11-04

## 2020-11-04 NOTE — PATIENT INSTRUCTIONS
Wound Care: After Your Visit Your Care Instructions Taking good care of your wound at home will help it heal quickly and reduce your chance of infection. The doctor has checked you carefully, but problems can develop later. If you notice any problems or new symptoms, get medical treatment right away. Follow-up care is a key part of your treatment and safety. Be sure to make and go to all appointments, and call your doctor if you are having problems. It's also a good idea to know your test results and keep a list of the medicines you take. How can you care for yourself at home? · Clean the area with soap and water 2 times a day unless your doctor gives you different instructions. Don't use hydrogen peroxide or alcohol, which can slow healing. ¨ You may cover the wound with a thin layer of antibiotic ointment, such as bacitracin, and a nonstick bandage. ¨ Apply more ointment and replace the bandage as needed. · Take pain medicines exactly as directed. Some pain is normal with a wound, but do not ignore pain that is getting worse instead of better. You could have an infection. ¨ If the doctor gave you a prescription medicine for pain, take it as prescribed. ¨ If you are not taking a prescription pain medicine, ask your doctor if you can take an over-the-counter medicine. · Your doctor may have closed your wound with stitches (sutures), staples, or skin glue. ¨ If you have stitches, your doctor may remove them after several days to 2 weeks. Or you may have stitches that dissolve on their own. ¨ If you have staples, your doctor may remove them after 7 to 10 days. ¨ If your wound was closed with skin glue, the glue will wear off in a few days to 2 weeks. When should you call for help? Call your doctor now or seek immediate medical care if: 
· You have signs of infection, such as: 
¨ Increased pain, swelling, warmth, or redness near the wound. ¨ Red streaks leading from the wound. ¨ Pus draining from the wound. ¨ A fever. · You bleed so much from your incision that you soak one or more bandages over 2 to 4 hours. Watch closely for changes in your health, and be sure to contact your doctor if: · The wound is not getting better each day. Where can you learn more? Go to [employer] Enter X896 in the search box to learn more about \"Wound Care: After Your Visit. \"  
© 5044-1785 [employer]. Care instructions adapted under license by [facility] (which disclaims liability or warranty for this information). This care instruction is for use with your licensed healthcare professional. If you have questions about a medical condition or this instruction, always ask your healthcare professional. [address] any warranty or liability for your use of this information. Content Version: 17.5.170828; Last Revised: April 23, 2012

## 2020-11-04 NOTE — PROGRESS NOTES
Chief Complaint   Patient presents with    Wound Infection     large right breast / right underarm  and groin wound      HPI:  Jose Raul Sow is a 72 y.o. female with diabetes, hypertension, hypercholesterolemia, arthritis presents abscess of buttock right armpit noted a week. Also, there is Infected large right breast wound. Breast open wound is about 3X4 cm, tender. Mammogram a year ago was normal.  Wet to dry dressing has been demonstrated and dressing material give to patient. Patient will be referred to wound care and breast surgeon. Review of Systems  As per hpi    Past Medical History:   Diagnosis Date    Arthritis     shoulders and knees.     Diabetes ([address]     insulin dependent     Diabetes ([address]     Hypercholesterolemia     Hypertension      Past Surgical History:   Procedure Laterality Date    ABDOMEN SURGERY PROC UNLISTED          COLONOSCOPY N/A 2019    COLONOS[facility] performed [facility]issy [facility]ero [facility] at [facility] ENDOSCOPY    HX COLONOSCOPY      HX GYN      hysterectomy    HX GYN      hysterectomy, total for fibroid     Social History     Socioeconomic History    Marital status:      Spouse name: Not on file    Number of children: Not on file    Years of education: Not on file    Highest education level: Not on file   Tobacco Use    Smoking status: Current Every Day Smoker     Packs/day: 2.00     Years: 41.00     Pack years: 82.00     Types: Cigarettes    Smokeless tobacco: Never Used   Substance and Sexual Activity    Alcohol use: No     Frequency: Never     Binge frequency: Never    Drug use: No    Sexual activity: Never   Social History Narrative    ** Merged History Encounter **          Family History   Problem Relation Age of Onset    Diabetes Mother     Diabetes Father     Diabetes Sister     Diabetes Brother      Current Outpatient Medications   Medication Sig Dispense Refill    methocarbamoL (ROBAXIN) 750 mg tablet Take 1 Tab by mouth four (4) times daily. 30 Tab 1    buPROPion SR (WELLBUTRIN SR) 150 mg SR tablet TAKE 1 TAB DAILY X 1 WEEK THEN INCREASE TO 1 TABLET TWICE DAILY- FOR DEPRESSION & SMOKING 60 Tab 5    metFORMIN (GLUCOPHAGE) 1,000 mg tablet TAKE 1 TABLET BY MOUTH TWICE A DAY WITH MEALS 60 Tab 3    budesonide-formoteroL (SYMBICORT) 80-4.5 mcg/actuation HFAA Take 2 Puffs by inhalation two (2) times a day. 1 Inhaler 2    acetaminophen (TYLENOL) 325 mg tablet TAKE 3 TABLETS BY MOUTH EVERY 8 HOURS      lidocaine (LIDODERM) 5 % APPLYT 1 PATCH TOPICALLY EVERY DAY      ibuprofen (MOTRIN) 400 mg tablet TAKE 1 TABLET BY MOUTH EVERY 4 HOURS      cholecalciferol (VITAMIN D3) (5000 Units /125 mcg) capsule TAKE 1 CAPSULE BY MOUTH EVERY DAY 30 Cap 5    FreeStyle Lancets 28 gauge misc TESTING 3 TIMES A DAY. E11.65 100 Lancet 2    pravastatin (PRAVACHOL) 20 mg tablet TAKE 1 TABLET BY MOUTH EVERY DAY AT NIGHT 30 Tab 4    glucose blood VI test strips (FREESTYLE INSULINX) strip Check sugar 3 times a day 300 Strip 4    albuterol (PROVENTIL HFA, VENTOLIN HFA, PROAIR HFA) 90 mcg/actuation inhaler Take 1 Puff by inhalation every six (6) hours as needed for Wheezing. 1 Inhaler 1    gabapentin (NEURONTIN) 100 mg capsule Take 2 Caps by mouth three (3) times daily. 90 Cap 0    insulin aspart protamine/insulin aspart (NOVOLOG MIX 70-30FLEXPEN U-100) 100 unit/mL (70-30) inpn 40 units at noon (first meal) and 40 units at 7 pm (second meal) 10 Pen 6    Insulin Needles, Disposable, (BD ULTRA-FINE MINI PEN NEEDLE) 31 gauge x 3/16\" ndle Use to inject insulin twice daily 200 Pen Needle 3    lisinopril (PRINIVIL, ZESTRIL) 5 mg tablet Take 1 Tab by mouth daily. 90 Tab 1    meloxicam (MOBIC) 15 mg tablet Take 1 Tab by mouth daily. 20 Tab 1    lancets 32 gauge misc 1 Each by Does Not Apply route three (3) times daily. 300 Lancet 3    Omeprazole delayed release (PRILOSEC D/R) 20 mg tablet Take 1 Tab by mouth daily as needed (acid reflux).  90 Tab 0    Blood-Glucose Meter (FREESTYLE FLASH SYSTEM) monitoring kit Test 3 Times Daily   DX E11.40,  E11.65 1 Kit 0     No Known Allergies    Objective:  Visit Vitals  Ht 5' 4\" (1.626 m)   Wt 203 lb (92.1 kg)   LMP  (LMP Unknown)   BMI 34.84 kg/m²     Physical Exam:   General appearance - alert, well appearing in no distress  Mental status - alert, oriented to person, place, and time  Breasts: right breast 3X4 skin open wound, tender to touch, tender right axillary lump  left breast normal without mass, skin or nipple changes or axillary nodes.   Chest - clear to auscultation, no wheezes, rales or rhonchi  Heart - normal rate, regular rhythm, no murmurs  Abdomen - soft, nontender, nondistended, no organomegaly  Ext-peripheral pulses normal, no pedal edema    Results for orders placed or performed in visit on 09/28/20   MICROALBUMIN, UR, RAND W/ MICROALB/CREAT RATIO   Result Value Ref Range    Creatinine, urine 63.0 Not Estab. mg/dL    Microalbumin, urine 13.1 Not Estab. ug/mL    Microalb/Creat ratio (ug/mg creat.) 21 0 - 29 mg/g creat   HEMOGLOBIN A1C WITH EAG   Result Value Ref Range    Hemoglobin A1c 9.2 (H) 4.8 - 5.6 %    Estimated average glucose 217 mg/dL   LIPID PANEL   Result Value Ref Range    Cholesterol, total 189 100 - 199 mg/dL    Triglyceride 162 (H) 0 - 149 mg/dL    HDL Cholesterol 42 >39 mg/dL    VLDL Cholesterol Gerald 29 5 - 40 mg/dL    LDL Chol Calc (NIH) 118 (H) 0 - 99 mg/dL   VITAMIN D, 25 HYDROXY   Result Value Ref Range    VITAMIN D, 25-HYDROXY 43.4 30.0 - 100.0 ng/mL   URINALYSIS W/ RFLX MICROSCOPIC   Result Value Ref Range    Specific Gravity 1.029 1.005 - 1.030    pH (UA) 5.5 5.0 - 7.5    Color Yellow Yellow    Appearance Clear Clear    Leukocyte Esterase Negative Negative    Protein Negative Negative/Trace    Glucose 3+ (A) Negative    Ketone Negative Negative    Blood Negative Negative    Bilirubin Negative Negative    Urobilinogen 0.2 0.2 - 1.0 mg/dL    Nitrites Negative Negative    Microscopic Examination Comment METABOLIC PANEL, COMPREHENSIVE   Result Value Ref Range    Glucose 323 (H) 65 - 99 mg/dL    BUN 16 8 - 27 mg/dL    Creatinine 0.89 0.57 - 1.00 mg/dL    GFR est non-AA 68 >59 mL/min/1.73    GFR est AA 79 >59 mL/min/1.73    BUN/Creatinine ratio 18 12 - 28    Sodium 138 134 - 144 mmol/L    Potassium 4.5 3.5 - 5.2 mmol/L    Chloride 100 96 - 106 mmol/L    CO2 26 20 - 29 mmol/L    Calcium 9.7 8.7 - 10.3 mg/dL    Protein, total 7.1 6.0 - 8.5 g/dL    Albumin 4.1 3.8 - 4.8 g/dL    GLOBULIN, TOTAL 3.0 1.5 - 4.5 g/dL    A-G Ratio 1.4 1.2 - 2.2    Bilirubin, total 0.2 0.0 - 1.2 mg/dL    Alk. phosphatase 96 39 - 117 IU/L    AST (SGOT) 12 0 - 40 IU/L    ALT (SGPT) 12 0 - 32 IU/L     Assessment/Plan:  Diagnoses and all orders for this visit:    Open wound of right breast, initial encounter  -     trimethoprim-sulfamethoxazole (BACTRIM DS, SEPTRA DS) 160-800 mg per tablet; Take 1 Tab by mouth two (2) times a day for 10 days. , Normal, Disp-20 Tab,R-0  -     Cancel: REFERRAL TO WOUND CARE  -     Cancel: CULTURE, ANAEROBIC AND AEROBIC  -     REFERRAL TO WOUND CARE  -     REFERRAL TO BREAST SURGERY  -     CULTURE, ANAEROBIC AND AEROBIC    Axillary abscess  -     trimethoprim-sulfamethoxazole (BACTRIM DS, SEPTRA DS) 160-800 mg per tablet; Take 1 Tab by mouth two (2) times a day for 10 days. , Normal, Disp-20 Tab,R-0    Severe obesity ([address]    DM type 2, uncontrolled, with neuropathy (HCC)  -     glucose blood VI test strips (Freestyle [employer]) strip; Check sugar 3 times a day, Normal, Disp-300 Strip,R-4  -     FreeStyle Lancets 28 gauge misc; TESTING 3 TIMES A DAY. E11.65, Normal, Disp-100 Lancet,R-2,DAWDX Code Needed  . Red eyes  -     trimethoprim-polymyxin b (POLYTRIM) ophthalmic solution; Administer 1 Drop to both eyes every four (4) hours for 10 days. , Normal, Disp-1 Bottle,R-0      Patient Instructions       Wound Care: After Your Visit  Your Care Instructions  Taking good care of your wound at home will help it heal quickly and reduce your chance of infection. The doctor has checked you carefully, but problems can develop later. If you notice any problems or new symptoms, get medical treatment right away. Follow-up care is a key part of your treatment and safety. Be sure to make and go to all appointments, and call your doctor if you are having problems. It's also a good idea to know your test results and keep a list of the medicines you take. How can you care for yourself at home? · Clean the area with soap and water 2 times a day unless your doctor gives you different instructions. Don't use hydrogen peroxide or alcohol, which can slow healing. ¨ You may cover the wound with a thin layer of antibiotic ointment, such as bacitracin, and a nonstick bandage. ¨ Apply more ointment and replace the bandage as needed. · Take pain medicines exactly as directed. Some pain is normal with a wound, but do not ignore pain that is getting worse instead of better. You could have an infection. ¨ If the doctor gave you a prescription medicine for pain, take it as prescribed. ¨ If you are not taking a prescription pain medicine, ask your doctor if you can take an over-the-counter medicine. · Your doctor may have closed your wound with stitches (sutures), staples, or skin glue. ¨ If you have stitches, your doctor may remove them after several days to 2 weeks. Or you may have stitches that dissolve on their own. ¨ If you have staples, your doctor may remove them after 7 to 10 days. ¨ If your wound was closed with skin glue, the glue will wear off in a few days to 2 weeks. When should you call for help? Call your doctor now or seek immediate medical care if:  · You have signs of infection, such as:  ¨ Increased pain, swelling, warmth, or redness near the wound. ¨ Red streaks leading from the wound. ¨ Pus draining from the wound. ¨ A fever.   · You bleed so much from your incision that you soak one or more bandages over 2 to 4 hours. Watch closely for changes in your health, and be sure to contact your doctor if:  · The wound is not getting better each day. Where can you learn more? Go to [employer]  Enter M973 in the search box to learn more about \"Wound Care: After Your Visit. \"   © 2713-7827 Healthwise, Incorporated. Care instructions adapted under license by [facility] (which disclaims liability or warranty for this information). This care instruction is for use with your licensed healthcare professional. If you have questions about a medical condition or this instruction, always ask your healthcare professional. [place] [address] any warranty or liability for your use of this information. Content Version: 34.0.958750; Last Revised: April 23, 2012                Follow-up and Dispositions    · Return 2-3 weeks, for routine follow up.

## 2020-11-09 LAB
BACTERIA SPEC AEROBE CULT: ABNORMAL
BACTERIA SPEC AEROBE CULT: ABNORMAL
BACTERIA SPEC ANAEROBE CULT: ABNORMAL

## 2020-11-09 RX ORDER — PRAVASTATIN SODIUM 20 MG/1
TABLET ORAL
Qty: 30 TAB | Refills: 4 | Status: SHIPPED | OUTPATIENT
Start: 2020-11-09 | End: 2021-08-26 | Stop reason: SDUPTHER

## 2020-11-30 ENCOUNTER — VIRTUAL VISIT (OUTPATIENT)
Dept: ENDOCRINOLOGY | Age: 65
End: 2020-11-30
Payer: MEDICAID

## 2020-11-30 DIAGNOSIS — E11.65 TYPE 2 DIABETES MELLITUS WITH HYPERGLYCEMIA, WITH LONG-TERM CURRENT USE OF INSULIN (HCC): Primary | ICD-10-CM

## 2020-11-30 DIAGNOSIS — Z79.4 TYPE 2 DIABETES MELLITUS WITH HYPERGLYCEMIA, WITH LONG-TERM CURRENT USE OF INSULIN (HCC): Primary | ICD-10-CM

## 2020-11-30 PROCEDURE — 99214 OFFICE O/P EST MOD 30 MIN: CPT | Performed by: INTERNAL MEDICINE

## 2020-11-30 RX ORDER — BLOOD SUGAR DIAGNOSTIC
STRIP MISCELLANEOUS
Qty: 200 STRIP | Refills: 10 | Status: SHIPPED | OUTPATIENT
Start: 2020-11-30

## 2020-11-30 RX ORDER — INSULIN PUMP SYRINGE, 3 ML
EACH MISCELLANEOUS
Qty: 1 KIT | Refills: 0 | Status: SHIPPED | OUTPATIENT
Start: 2020-11-30

## 2020-11-30 NOTE — PROGRESS NOTES
This is an established visit conducted via telemedicine using Shoto video. The patient has been instructed that this meets HIPAA criteria ,that they may receive a bill for these services and acknowledges and agrees to this method of visitation. This is a 80-year-old [place] female who we have seen once before in January 2018. She has had diabetes for roughly 20 years and was diagnosed after the death of her son in the [place] war. At that time she was taking Humalog insulin plus NovoLog 70/30 insulin. We eliminated the Humalog and encouraged her to take 20 units of 70/30 insulin in the morning and 40 units in the evening based on the fact that she told us via  that her largest meal was the evening meal.   Our last visit with her was in January 2019. At that time she agreed to take 40 units of insulin twice daily. We have not seen her since then. Her A1c most recently is 9.2%. Other laboratory tests included a glucose of 323 and a creatinine of 0.89.     Current Diabetes Medication  Metformin 1000 mg BID  70/30 insulin 40 units BID    She presents today with her son-in-law who is  to one of the daughters; he served as . Little has changed since I saw them last.  On both occasions, I have encouraged her to take her insulin before the meal but she does not do this. She checks her blood sugar occasionally but only after she eats a meal.  So in essence she gets up in the morning, eats breakfast, checks her blood sugar an hour later and then takes a dose of insulin. She tells me that most of the postprandial blood sugars are in the 2  range. On average she eats one meal somewhere between 10 AM and 1 PM and another meal somewhere between 8 PM and 10PM.  Both meals are usually flat bread usually with eggs in the morning and fried fish or soup with potatoes or other vegetables in the evening. I asked her son-in-law to show me a piece of the roti.   It is fairly thick and at least 9 inches in diameter. So these are not small Roti. She says she only eats 1 at each meal.  She eats fruit between meals. She drinks tea with sugar. Is also important to note that she smokes 2 pack of cigarettes a day. She denies chest pain or shortness of breath. She denies constipation or diarrhea. She does complain of lesions on her skin which will be detailed below. She complains of nocturia x2. GENERAL: NCAT, Appears well nourished   EYES: EOMI, non-icteric, no proptosis   Ear/Nose/Throat: NCAT, no visible inflammation or masses   CARDIOVASCULAR: no cyanosis, no visible JVD   RESPIRATORY: comfortable respirations observed, no cyanosis   MUSCULOSKELETAL: Normal ROM of upper extremities observed   SKIN: There are number of excoriated poorly healing areas in the periareolar area of her breasts. Her daughter who helped me with the examination tells me that she has similar lesions on her buttocks but these were not shared with me. NEUROLOGIC:  AAOx3   PSYCHIATRIC: Normal affect, Normal insight and judgement       Impression type 2 diabetes mellitus with poor blood sugar control of longstanding  Plan: I spent considerable time going over the need to check blood sugars and to take insulin before the meals not after the meals. I did not not make an attempt to change the diet at this point but rather to regularize the insulin dosing. The son who served as  seem to understand this and explained it to his mother in law. I have asked him to check blood sugars and we will see her back in 1 month to review the blood sugar readings. We spent more than 25 mintutes face to face, more than 50% was in counseling regarding diet, exercise and carbohydrate intake.

## 2020-12-09 ENCOUNTER — OFFICE VISIT (OUTPATIENT)
Dept: SURGERY | Age: 65
End: 2020-12-09
Payer: MEDICAID

## 2020-12-09 ENCOUNTER — HOSPITAL ENCOUNTER (OUTPATIENT)
Dept: LAB | Age: 65
Discharge: HOME | End: 2020-12-09

## 2020-12-09 DIAGNOSIS — L98.8 SKIN LESION OF BREAST: Primary | ICD-10-CM

## 2020-12-09 PROCEDURE — 11104 PUNCH BX SKIN SINGLE LESION: CPT | Performed by: SURGERY

## 2020-12-09 PROCEDURE — 99203 OFFICE O/P NEW LOW 30 MIN: CPT | Performed by: SURGERY

## 2020-12-09 NOTE — LETTER
12/10/20 Patient: Erik Murillo YOB: 1955 Date of Visit: 12/9/2020 Jose Roberto Adkins MD 
[address] [facility] [address] [address] VIA In Basket Dear Jose Roberto Adkins MD, Thank you for referring Ms. Erik Murillo to [address] MAIN OFFICE SUITE [facility] for evaluation. My notes for this consultation are attached. If you have questions, please do not hesitate to call me. I look forward to following your patient along with you. Sincerely, Javi Coronado MD

## 2020-12-09 NOTE — PROGRESS NOTES
HISTORY OF PRESENT ILLNESS  Ross Rushing is a 72 y.o. female. HPI NEW Patient presents for consultation at the request of Dr. Keon Chilel for RIGHT breast skin cyst. She was seen by Dr. Keon Chilel about a month ago for this and it sounds like she was prescribed antibiotics. Patient states that this has occurred in the past and has had procedures to have the cysts opened and drained. No family history of breast or ovarian cancer. Breast imaging-  [facility] Results (most recent):  Results from Hospital Encounter encounter on 07/08/19   [facility] MAMMO BI SCREENING INCL CAD    Narrative STUDY: Bilateral digital screening mammogram    INDICATION:  Screening. COMPARISON: None. BREAST COMPOSITION:  There are scattered areas of fibroglandular density. FINDINGS: Bilateral digital screening mammography was performed and is  interpreted in conjunction with a computer assisted detection (CAD) system. No  suspicious masses or calcifications are identified. No skin thickening or nipple  retraction. Impression IMPRESSION:  BI-RADS 1: Negative. No mammographic evidence of malignancy. RECOMMENDATIONS:  Next screening mammogram is recommended in one year. The patient will be notified of these results. Review of Systems   Constitutional: Negative. HENT: Negative. Eyes: Negative. Respiratory: Negative. Cardiovascular: Negative. Gastrointestinal: Negative. Genitourinary: Negative. Musculoskeletal: Negative. Skin: Negative. Neurological: Negative. Endo/Heme/Allergies: Negative. Psychiatric/Behavioral: Negative. All other systems reviewed and are negative. Physical Exam  Vitals signs and nursing note reviewed. Constitutional:       Appearance: She is well-developed. HENT:      Head: Normocephalic. Neck:      Thyroid: No thyromegaly. Pulmonary:      Effort: Pulmonary effort is normal.   Chest:      Breasts: Breasts are symmetrical.         Right: Skin change present.  No inverted nipple, mass, nipple discharge or tenderness. Left: Skin change present. No inverted nipple, mass, nipple discharge or tenderness. Comments: Multiple superficial skin lesions on right breast  Largest 1 x 1 cm   Abdominal:      Palpations: Abdomen is soft. Musculoskeletal: Normal range of motion. Lymphadenopathy:      Cervical: No cervical adenopathy. Skin:     General: Skin is warm and dry. Neurological:      Mental Status: She is alert and oriented to person, place, and time. Procedure: skin punch biopsy 3 mm  Indication: skin lesion   After informed consent was obtained right  breast skin was anesthesized with 1% lidocaine. A 3 mm punch biopsy of lesion was taken at 10:00 . A 4-0 nylon mattress suture was used to close the skin. No complications.      [facility] MAIN OFFICE [address] [facility]  OFFICE PROCEDURE PROGRESS NOTE        Chart reviewed for the following:   Julieta Fernández MD, have reviewed the History, Physical and updated the Allergic reactions for [address] [place] [address] performed immediately prior to start of procedure:   Julieta Fernández MD, have performed the following reviews on [facility] prior to the start of the procedure:            * Patient was identified by name and date of birth   * Agreement on procedure being performed was verified  * Risks and Benefits explained to the patient  * Procedure site verified and marked as necessary  * Patient was positioned for comfort  * Consent was signed and verified     Time: 3:30 pm       Date of procedure: 12/10/2020    Procedure performed by:  Nathan Melchor MD    Provider assisted by: Helene Mckenzie RN    Patient assisted by: son    How tolerated by patient: tolerated the procedure well with no complications    Post Procedural Pain Scale: 0 - No Hurt    Comments: none          ASSESSMENT and PLAN    ICD-10-CM ICD-9-CM    1. Skin lesion of breast  N64.9 611.9 SURGICAL PATHOLOGY Bilateral breast skin lesions  Skin punch performed  Will call son with results.   Patient still needs mammogram.

## 2020-12-09 NOTE — PATIENT INSTRUCTIONS

## 2020-12-16 ENCOUNTER — OFFICE VISIT (OUTPATIENT)
Dept: SURGERY | Age: 65
End: 2020-12-16
Payer: MEDICAID

## 2020-12-16 VITALS
HEART RATE: 94 BPM | TEMPERATURE: 98.2 F | WEIGHT: 203 LBS | DIASTOLIC BLOOD PRESSURE: 69 MMHG | SYSTOLIC BLOOD PRESSURE: 131 MMHG | HEIGHT: 64 IN | BODY MASS INDEX: 34.66 KG/M2

## 2020-12-16 DIAGNOSIS — L28.0 LICHENOID DERMATITIS: Primary | ICD-10-CM

## 2020-12-16 PROCEDURE — 99212 OFFICE O/P EST SF 10 MIN: CPT | Performed by: SURGERY

## 2020-12-16 NOTE — LETTER
12/18/2020 Patient: Jesus Alberto Jackson YOB: 1955 Date of Visit: 12/16/2020 Gabe Atkins MD 
[address] [facility] [address] [address] [address] [employer] Dear Gabe Atkins MD, Thank you for referring Ms. Jesus Alberto Jackson to [address] MAIN OFFICE SUITE [facility] for evaluation. My notes for this consultation are attached. If you have questions, please do not hesitate to call me. I look forward to following your patient along with you. Sincerely, Colt Betancourt MD

## 2020-12-16 NOTE — PROGRESS NOTES
Discussed result with patient and minerva in person  Will refer to derm  Gave number to schedule mammo

## 2020-12-16 NOTE — PROGRESS NOTES
HISTORY OF PRESENT ILLNESS  Carisa To is a 72 y.o. female. HPI ESTABLISHED patient here for suture removal 1 week s/p punch biopsy of RIGHT breast skin. No pain. Her daughter is here with her. FINAL PATHOLOGIC DIAGNOSIS   Right breast skin lesion, punch biopsy:   Lichenoid tissue reaction (see comment). No family history of breast or ovarian cancer.     Breast imaging-  [facility] Results (most recent):  Results from Hospital Encounter encounter on 07/08/19   [facility] MAMMO BI SCREENING INCL CAD    Narrative STUDY: Bilateral digital screening mammogram    INDICATION:  Screening. COMPARISON: None. BREAST COMPOSITION:  There are scattered areas of fibroglandular density. FINDINGS: Bilateral digital screening mammography was performed and is  interpreted in conjunction with a computer assisted detection (CAD) system. No  suspicious masses or calcifications are identified. No skin thickening or nipple  retraction. Impression IMPRESSION:  BI-RADS 1: Negative. No mammographic evidence of malignancy. RECOMMENDATIONS:  Next screening mammogram is recommended in one year. The patient will be notified of these results. Review of Systems   All other systems reviewed and are negative.       Physical Exam  Chest:      Comments: Unchanged skin lesions        ASSESSMENT and PLAN    [id number]    1. Lichenoid dermatitis  L28.0 692.9      - still needs mammogram for screening  Since this is a dermatologic issue will refer to derm  Plan explained to them via

## 2020-12-30 ENCOUNTER — OFFICE VISIT (OUTPATIENT)
Dept: FAMILY MEDICINE CLINIC | Age: 65
End: 2020-12-30
Payer: MEDICAID

## 2020-12-30 VITALS
OXYGEN SATURATION: 96 % | DIASTOLIC BLOOD PRESSURE: 64 MMHG | HEART RATE: 83 BPM | HEIGHT: 64 IN | SYSTOLIC BLOOD PRESSURE: 124 MMHG | WEIGHT: 206 LBS | RESPIRATION RATE: 16 BRPM | BODY MASS INDEX: 35.17 KG/M2 | TEMPERATURE: 98 F

## 2020-12-30 DIAGNOSIS — I10 HYPERTENSION, WELL CONTROLLED: ICD-10-CM

## 2020-12-30 DIAGNOSIS — E11.9 DIABETIC EYE EXAM (HCC): ICD-10-CM

## 2020-12-30 DIAGNOSIS — L98.8 SKIN LESION OF BREAST: ICD-10-CM

## 2020-12-30 DIAGNOSIS — Z01.00 DIABETIC EYE EXAM (HCC): ICD-10-CM

## 2020-12-30 DIAGNOSIS — L98.9 SKIN LESION OF FACE: ICD-10-CM

## 2020-12-30 PROCEDURE — 99214 OFFICE O/P EST MOD 30 MIN: CPT | Performed by: INTERNAL MEDICINE

## 2020-12-30 NOTE — PROGRESS NOTES
Chief Complaint   Patient presents with    Follow-up       1. Have you been to the ER, urgent care clinic since your last visit? Hospitalized since your last visit? No    2. Have you seen or consulted any other health care providers outside of the [address] [place] [address] since your last visit? Include any pap smears or colon screening. No     Pt wants referral to dermatology.  Pt is also having burning stomach pain every time she eats, would like to discus tx options

## 2020-12-30 NOTE — PROGRESS NOTES
Chief Complaint   Patient presents with    Follow-up     HPI:  Gage Reyes is a 72 y.o. female presents to follow-up. Diabetes is poorly controlled, last A1C =9.2%. Cholesterol compared to last is moderately controlled. Patient was referred to Breast surgery for evaluation breast sore. Breast surgeon advised her to see a dermatologist.  Also, she has a form from [employer] to be completed so she can be excused from taking the citizenship exam.    Review of Systems  As per hpi    Past Medical History:   Diagnosis Date    Arthritis     shoulders and knees.     Diabetes ([address]     insulin dependent     Diabetes ([address]     Hypercholesterolemia     Hypertension      Past Surgical History:   Procedure Laterality Date    ABDOMEN SURGERY PROC UNLISTED          COLONOSCOPY N/A 2019    COL[facility]OSCOPY performed by Angeles Borden [facility] at [facility] ENDOSCOPY    HX COLONOSCOPY      HX GYN      hysterectomy    HX GYN      hysterectomy, total for fibroid     Social History     Socioeconomic History    Marital status:      Spouse name: Not on file    Number of children: Not on file    Years of education: Not on file    Highest education level: Not on file   Tobacco Use    Smoking status: Current Every Day Smoker     Packs/day: 2.00     Years: 41.00     Pack years: 82.00     Types: Cigarettes    Smokeless tobacco: Never Used   Substance and Sexual Activity    Alcohol use: No     Frequency: Never     Binge frequency: Never    Drug use: No    Sexual activity: Never   Social History Narrative    ** Merged History Encounter **          Family History   Problem Relation Age of Onset    Diabetes Mother     Diabetes Father     Diabetes Sister     Diabetes Brother      Current Outpatient Medications   Medication Sig Dispense Refill    Blood-Glucose Meter (FreeStyle System Kit) monitoring kit Monitor blood sugar 3 times daily 1 Kit 0    glucose blood VI test strips (FreeStyle Test) strip Monitor blood sugar 3 times daily. E11.65, Z79.4 200 Strip 10    pravastatin (PRAVACHOL) 20 mg tablet TAKE 1 TABLET BY MOUTH EVERY DAY AT NIGHT 30 Tab 4    glucose blood VI test strips (Freestyle [employer]) strip Check sugar 3 times a day 300 Strip 4    FreeStyle Lancets 28 gauge misc TESTING 3 TIMES A DAY. E11.65 100 Lancet 2    methocarbamoL (ROBAXIN) 750 mg tablet Take 1 Tab by mouth four (4) times daily. 30 Tab 1    buPROPion SR (WELLBUTRIN SR) 150 mg SR tablet TAKE 1 TAB DAILY X 1 WEEK THEN INCREASE TO 1 TABLET TWICE DAILY- FOR DEPRESSION & SMOKING 60 Tab 5    metFORMIN (GLUCOPHAGE) 1,000 mg tablet TAKE 1 TABLET BY MOUTH TWICE A DAY WITH MEALS 60 Tab 3    budesonide-formoteroL (SYMBICORT) 80-4.5 mcg/actuation HFAA Take 2 Puffs by inhalation two (2) times a day. 1 Inhaler 2    acetaminophen (TYLENOL) 325 mg tablet TAKE 3 TABLETS BY MOUTH EVERY 8 HOURS      lidocaine (LIDODERM) 5 % APPLYT 1 PATCH TOPICALLY EVERY DAY      ibuprofen (MOTRIN) 400 mg tablet TAKE 1 TABLET BY MOUTH EVERY 4 HOURS      cholecalciferol (VITAMIN D3) (5000 Units /125 mcg) capsule TAKE 1 CAPSULE BY MOUTH EVERY DAY 30 Cap 5    albuterol (PROVENTIL HFA, VENTOLIN HFA, PROAIR HFA) 90 mcg/actuation inhaler Take 1 Puff by inhalation every six (6) hours as needed for Wheezing. 1 Inhaler 1    gabapentin (NEURONTIN) 100 mg capsule Take 2 Caps by mouth three (3) times daily. 90 Cap 0    insulin aspart protamine/insulin aspart (NOVOLOG MIX 70-30FLEXPEN U-100) 100 unit/mL (70-30) inpn 40 units at noon (first meal) and 40 units at 7 pm (second meal) 10 Pen 6    Insulin Needles, Disposable, (BD ULTRA-FINE MINI PEN NEEDLE) 31 gauge x 3/16\" ndle Use to inject insulin twice daily 200 Pen Needle 3    lisinopril (PRINIVIL, ZESTRIL) 5 mg tablet Take 1 Tab by mouth daily. 90 Tab 1    meloxicam (MOBIC) 15 mg tablet Take 1 Tab by mouth daily. 20 Tab 1    lancets 32 gauge misc 1 Each by Does Not Apply route three (3) times daily.  [address] Omeprazole delayed release (PRILOSEC D/R) 20 mg tablet Take 1 Tab by mouth daily as needed (acid reflux). 90 Tab 0    Blood-Glucose Meter (FREESTYLE FLASH SYSTEM) monitoring kit Test 3 Times Daily   DX E11.40,  E11.65 1 Kit 0     No Known Allergies    Objective:  Visit Vitals  /64 (BP 1 Location: Right arm, BP Patient Position: Sitting)   Pulse 83   Temp 98 °F (36.7 °C) (Temporal)   Resp 16   Ht 5' 4\" (1.626 m)   Wt 206 lb (93.4 kg)   LMP  (LMP Unknown)   SpO2 96%   BMI 35.36 kg/m²     Physical Exam:   General appearance - alert, well appearing in no distress  Mental status - alert, oriented to person, place, and time  Chest - clear to auscultation, no wheezes, rales or rhonchi  Heart - normal rate, regular rhythm, no murmurs  Abdomen - soft, nontender, nondistended, no organomegaly  Ext-peripheral pulses normal, no pedal edema  Skin-Warm and dry. Right breast open woud    Results for orders placed or performed in visit on 11/04/20   CULTURE, ANAEROBIC AND AEROBIC    Specimen: Wound   Result Value Ref Range    Anerobic culture No anaerobic growth in 72 hours. Aerobic culture (A)      Coagulase negative Staphylococcus species. Heavy growth      Aerobic culture Mixed skin kalia  Light growth          Susceptibility    Coagulase negative staphylococcus species.  -  (no method available)*     Ciprofloxacin ($) I Intermediate ug/mL     Clindamycin ($) R Resistant ug/mL     Erythromycin ($$$$) R Resistant ug/mL     Gentamicin ($) S Susceptible ug/mL     Levofloxacin ($) S Susceptible ug/mL     Oxacillin R Resistant ug/mL     Penicillin R Resistant ug/mL     Rifampin ($$$$) S Susceptible ug/mL     Tetracycline S Susceptible ug/mL     Trimeth-Sulfamethoxa S Susceptible ug/mL     Vancomycin ($) S Susceptible ug/mL     * Performed at:  [address] [place] [address]  [id number] Director: Jevon Jacobson MD, Phone:  [phone]     Assessment/Plan:  Diagnoses and all orders for this visit:    DM type 2, uncontrolled, with neuropathy ([facility]  -     HEMOGLOBIN A1C WITH EAG    Hypertension, well controlled    Diabetic eye exam ([facility]  -     REFERRAL TO OPHTHALMOLOGY    Skin lesion of face    Skin lesion of breast  -     REFERRAL TO DERMATOLOGY      Patient Instructions          Skin Tears: Care Instructions  Your Care Instructions  As we get older, our skin gets drier and more fragile. Sometimes this can cause the outer layers of skin to split and tear open. Skin tears are treated in different ways. In some cases, doctors use pieces of tape called Steri-Strips to pull the skin together and help it heal. Other times, it's best to leave the tear open and cover it with a special wound-care bandage. Skin tears are usually not serious. They usually heal in a few weeks. But how long you take to heal depends on your body and the type of tear you have. Sometimes the torn piece of skin is used to protect the wound while it heals. But that piece of skin does not heal. It may fall off on its own. Or the doctor may remove it. As your tear heals, it's important to keep it clean to help prevent infection. The doctor has checked you carefully, but problems can develop later. If you notice any problems or new symptoms, get medical treatment right away. Follow-up care is a key part of your treatment and safety. Be sure to make and go to all appointments, and call your doctor if you are having problems. It's also a good idea to know your test results and keep a list of the medicines you take. How can you care for yourself at home? · If you have pain, ask your doctor if you can take an over-the-counter pain medicine, such as acetaminophen (Tylenol), ibuprofen (Advil, Motrin), or naproxen (Aleve). Be safe with medicines. Read and follow all instructions on the label. · If you have a bandage, follow your doctor's instructions for changing it.   · If you have Steri-Strips, leave them on until they fall off.  · Follow your doctor's instructions about bathing. · Gently wash the skin tear with plain water 2 times a day. Do not rub the area. · Let the area air dry. Or you can pat it carefully with a soft towel. When should you call for help? Call your doctor now or seek immediate medical care if:    · You have signs of infection, such as:  ? Increased pain, swelling, warmth, or redness around the tear. ? Red streaks leading from the tear. ? Pus draining from the tear. ? A fever.     · The tear starts to bleed a lot. Small amounts of blood are normal.   Watch closely for changes in your health, and be sure to contact your doctor if:    · You do not get better as expected. Where can you learn more? Go to http://www.gray.com/  Enter S385 in the search box to learn more about \"Skin Tears: Care Instructions. \"  Current as of: June 26, 2019               Content Version: 12.6  © 4578-4780 [employer]. Care instructions adapted under license by [employer] (which disclaims liability or warranty for this information). If you have questions about a medical condition or this instruction, always ask your healthcare professional. [place] [address] any warranty or liability for your use of this information. Follow-up and Dispositions    · Return 2-3 weeks, for f/u for form completion.    Follow-up and Disposition History

## 2021-01-04 NOTE — PATIENT INSTRUCTIONS
Skin Tears: Care Instructions  Your Care Instructions  As we get older, our skin gets drier and more fragile. Sometimes this can cause the outer layers of skin to split and tear open. Skin tears are treated in different ways. In some cases, doctors use pieces of tape called Steri-Strips to pull the skin together and help it heal. Other times, it's best to leave the tear open and cover it with a special wound-care bandage. Skin tears are usually not serious. They usually heal in a few weeks. But how long you take to heal depends on your body and the type of tear you have. Sometimes the torn piece of skin is used to protect the wound while it heals. But that piece of skin does not heal. It may fall off on its own. Or the doctor may remove it. As your tear heals, it's important to keep it clean to help prevent infection. The doctor has checked you carefully, but problems can develop later. If you notice any problems or new symptoms, get medical treatment right away. Follow-up care is a key part of your treatment and safety. Be sure to make and go to all appointments, and call your doctor if you are having problems. It's also a good idea to know your test results and keep a list of the medicines you take. How can you care for yourself at home? · If you have pain, ask your doctor if you can take an over-the-counter pain medicine, such as acetaminophen (Tylenol), ibuprofen (Advil, Motrin), or naproxen (Aleve). Be safe with medicines. Read and follow all instructions on the label. · If you have a bandage, follow your doctor's instructions for changing it. · If you have Steri-Strips, leave them on until they fall off. · Follow your doctor's instructions about bathing. · Gently wash the skin tear with plain water 2 times a day. Do not rub the area. · Let the area air dry. Or you can pat it carefully with a soft towel. When should you call for help?    Call your doctor now or seek immediate medical care if:    · You have signs of infection, such as:  ? Increased pain, swelling, warmth, or redness around the tear. ? Red streaks leading from the tear. ? Pus draining from the tear. ? A fever.     · The tear starts to bleed a lot. Small amounts of blood are normal.   Watch closely for changes in your health, and be sure to contact your doctor if:    · You do not get better as expected. Where can you learn more? Go to http://www.gray.com/  Enter K927 in the search box to learn more about \"Skin Tears: Care Instructions. \"  Current as of: June 26, 2019               Content Version: 12.6  © 9091-0281 [employer]. Care instructions adapted under license by [employer] (which disclaims liability or warranty for this information). If you have questions about a medical condition or this instruction, always ask your healthcare professional. [place] [address] any warranty or liability for your use of this information.

## 2021-01-11 RX ORDER — CHOLECALCIFEROL (VITAMIN D3) 125 MCG
CAPSULE ORAL
Qty: 30 CAP | Refills: 1 | Status: SHIPPED | OUTPATIENT
Start: 2021-01-11 | End: 2021-08-26 | Stop reason: SDUPTHER

## 2021-02-08 ENCOUNTER — OFFICE VISIT (OUTPATIENT)
Dept: FAMILY MEDICINE CLINIC | Age: 66
End: 2021-02-08
Payer: MEDICAID

## 2021-02-08 VITALS
BODY MASS INDEX: 35.17 KG/M2 | RESPIRATION RATE: 18 BRPM | OXYGEN SATURATION: 99 % | DIASTOLIC BLOOD PRESSURE: 67 MMHG | HEIGHT: 64 IN | SYSTOLIC BLOOD PRESSURE: 110 MMHG | HEART RATE: 83 BPM | TEMPERATURE: 97.1 F | WEIGHT: 206 LBS

## 2021-02-08 DIAGNOSIS — E55.9 VITAMIN D DEFICIENCY: ICD-10-CM

## 2021-02-08 DIAGNOSIS — F79 MENTAL IMPAIRMENT: ICD-10-CM

## 2021-02-08 DIAGNOSIS — Z71.6 ENCOUNTER FOR SMOKING CESSATION COUNSELING: ICD-10-CM

## 2021-02-08 DIAGNOSIS — I10 HYPERTENSION, WELL CONTROLLED: ICD-10-CM

## 2021-02-08 DIAGNOSIS — E78.2 MIXED HYPERCHOLESTEROLEMIA AND HYPERTRIGLYCERIDEMIA: ICD-10-CM

## 2021-02-08 LAB — GLUCOSE POC: 265 MG/DL

## 2021-02-08 PROCEDURE — 99214 OFFICE O/P EST MOD 30 MIN: CPT | Performed by: INTERNAL MEDICINE

## 2021-02-08 PROCEDURE — 82962 GLUCOSE BLOOD TEST: CPT | Performed by: INTERNAL MEDICINE

## 2021-02-08 RX ORDER — INSULIN ASPART 100 [IU]/ML
INJECTION, SUSPENSION SUBCUTANEOUS
Qty: 10 PEN | Refills: 6 | Status: SHIPPED | OUTPATIENT
Start: 2021-02-08 | End: 2021-08-26 | Stop reason: SDUPTHER

## 2021-02-08 RX ORDER — VARENICLINE TARTRATE 0.5 MG/1
0.5 TABLET, FILM COATED ORAL 2 TIMES DAILY
Qty: 60 TAB | Refills: 2 | Status: CANCELLED | OUTPATIENT
Start: 2021-02-08 | End: 2021-05-09

## 2021-02-08 RX ORDER — IBUPROFEN 200 MG
1 TABLET ORAL EVERY 24 HOURS
Qty: 30 PATCH | Refills: 0 | Status: CANCELLED | OUTPATIENT
Start: 2021-02-08 | End: 2021-03-10

## 2021-02-08 RX ORDER — VARENICLINE TARTRATE 25 MG
KIT ORAL
Qty: 1 DOSE PACK | Refills: 0 | Status: SHIPPED | OUTPATIENT
Start: 2021-02-08 | End: 2021-08-26 | Stop reason: SDUPTHER

## 2021-02-08 RX ORDER — INSULIN ASPART 100 [IU]/ML
INJECTION, SUSPENSION SUBCUTANEOUS
Qty: 10 PEN | Refills: 6 | Status: SHIPPED | OUTPATIENT
Start: 2021-02-08 | End: 2022-01-17 | Stop reason: SDUPTHER

## 2021-02-08 NOTE — PROGRESS NOTES
Chief Complaint   Patient presents with    Form Completion     appt with diabetic  2021  [place] Friends drYuko 2021, dentist 2021     HPI:  Alanda Saint is a 72 y.o. middle [place] female with significant medical history presents for Form Completion of a form that will exempt her from taking the citizenship test.  Reason being language barrier. She has appt with endocrinologist 2021, dermatologist 2021 and dentist 2021). Plan: referral to a neuropsychologist for evaluation to help with filling out the form. Review of Systems  As per hpi    Past Medical History:   Diagnosis Date    Arthritis     shoulders and knees.     Diabetes ([address]     insulin dependent     Diabetes ([address]     Hypercholesterolemia     Hypertension      Past Surgical History:   Procedure Laterality Date    COLONOSCOPY N/A 2019    [facility] at [facility] ENDOSCOPY    HX COLONOSCOPY      HX GYN      hysterectomy    HX GYN      hysterectomy, total for fibroid    [place] ABDOMEN SURGERY PROC UNLISTED           Social History     Socioeconomic History    Marital status:      Spouse name: Not on file    Number of children: Not on file    Years of education: Not on file    Highest education level: Not on file   Tobacco Use    Smoking status: Current Every Day Smoker     Packs/day: 2.00     Years: 41.00     Pack years: 82.00     Types: Cigarettes    Smokeless tobacco: Never Used   Substance and Sexual Activity    Alcohol use: No     Frequency: Never     Binge frequency: Never    Drug use: No    Sexual activity: Never   Social History Narrative    ** Merged History Encounter **          Family History   Problem Relation Age of Onset    Diabetes Mother     Diabetes Father     Diabetes Sister     Diabetes Brother      Current Outpatient Medications   Medication Sig Dispense Refill    cholecalciferol (VITAMIN D3) (5000 Units /125 mcg) capsule TAKE 1 CAPSULE BY MOUTH EVERY DAY 30 Cap 1    Blood-Glucose Meter (FreeStyle System Kit) monitoring kit Monitor blood sugar 3 times daily 1 Kit 0    glucose blood VI test strips (FreeStyle Test) strip Monitor blood sugar 3 times daily. E11.65, Z79.4 200 Strip 10    pravastatin (PRAVACHOL) 20 mg tablet TAKE 1 TABLET BY MOUTH EVERY DAY AT NIGHT 30 Tab 4    glucose blood VI test strips (Freestyle [employer]) strip Check sugar 3 times a day 300 Strip 4    FreeStyle Lancets 28 gauge misc TESTING 3 TIMES A DAY. E11.65 100 Lancet 2    methocarbamoL (ROBAXIN) 750 mg tablet Take 1 Tab by mouth four (4) times daily. 30 Tab 1    buPROPion SR (WELLBUTRIN SR) 150 mg SR tablet TAKE 1 TAB DAILY X 1 WEEK THEN INCREASE TO 1 TABLET TWICE DAILY- FOR DEPRESSION & SMOKING 60 Tab 5    metFORMIN (GLUCOPHAGE) 1,000 mg tablet TAKE 1 TABLET BY MOUTH TWICE A DAY WITH MEALS 60 Tab 3    budesonide-formoteroL (SYMBICORT) 80-4.5 mcg/actuation HFAA Take 2 Puffs by inhalation two (2) times a day. 1 Inhaler 2    acetaminophen (TYLENOL) 325 mg tablet TAKE 3 TABLETS BY MOUTH EVERY 8 HOURS      lidocaine (LIDODERM) 5 % APPLYT 1 PATCH TOPICALLY EVERY DAY      ibuprofen (MOTRIN) 400 mg tablet TAKE 1 TABLET BY MOUTH EVERY 4 HOURS      albuterol (PROVENTIL HFA, VENTOLIN HFA, PROAIR HFA) 90 mcg/actuation inhaler Take 1 Puff by inhalation every six (6) hours as needed for Wheezing. 1 Inhaler 1    gabapentin (NEURONTIN) 100 mg capsule Take 2 Caps by mouth three (3) times daily. 90 Cap 0    insulin aspart protamine/insulin aspart (NOVOLOG MIX 70-30FLEXPEN U-100) 100 unit/mL (70-30) inpn 40 units at noon (first meal) and 40 units at 7 pm (second meal) 10 Pen 6    Insulin Needles, Disposable, (BD ULTRA-FINE MINI PEN NEEDLE) 31 gauge x 3/16\" ndle Use to inject insulin twice daily 200 Pen Needle 3    lisinopril (PRINIVIL, ZESTRIL) 5 mg tablet Take 1 Tab by mouth daily. 90 Tab 1    meloxicam (MOBIC) 15 mg tablet Take 1 Tab by mouth daily.  20 Tab 1    lancets 32 gauge misc 1 Each by Does Not Apply route three (3) times daily. 300 Lancet 3    Omeprazole delayed release (PRILOSEC D/R) 20 mg tablet Take 1 Tab by mouth daily as needed (acid reflux). 90 Tab 0    Blood-Glucose Meter (FREESTYLE FLASH SYSTEM) monitoring kit Test 3 Times Daily   DX E11.40,  E11.65 1 Kit 0     No Known Allergies    Objective:  Visit Vitals  /67   Pulse 83   Temp 97.1 °F (36.2 °C) (Temporal)   Resp 18   Ht 5' 4\" (1.626 m)   Wt 206 lb (93.4 kg)   LMP  (LMP Unknown)   SpO2 99%   BMI 35.36 kg/m²     Physical Exam:   General appearance - alert, well appearing in no distress  Mental status - alert, oriented to person, place, and time  Chest - clear to auscultation, no wheezes, rales or rhonchi  Heart - normal rate, regular rhythm, no murmurs  Abdomen - soft, nontender, nondistended, no organomegaly  Ext-peripheral pulses normal, no pedal edema  Neuro - no focal findings  Back-full range of motion, no tenderness, palpable spasm or pain on motion     Results for orders placed or performed in visit on 11/04/20   CULTURE, ANAEROBIC AND AEROBIC    Specimen: Wound   Result Value Ref Range    Anerobic culture No anaerobic growth in 72 hours. Aerobic culture (A)      Coagulase negative Staphylococcus species. Heavy growth      Aerobic culture Mixed skin kalia  Light growth          Susceptibility    Coagulase negative staphylococcus species.  -  (no method available)*     Ciprofloxacin ($) I Intermediate ug/mL     Clindamycin ($) R Resistant ug/mL     Erythromycin ($$$$) R Resistant ug/mL     Gentamicin ($) S Susceptible ug/mL     Levofloxacin ($) S Susceptible ug/mL     Oxacillin R Resistant ug/mL     Penicillin R Resistant ug/mL     Rifampin ($$$$) S Susceptible ug/mL     Tetracycline S Susceptible ug/mL     Trimeth-Sulfamethoxa S Susceptible ug/mL     Vancomycin ($) S Susceptible ug/mL     * Performed at:  [address] [place] [address]  [id number] Director: Oriana Mclean MD, Phone:  [phone]     Assessment/Plan:  Diagnoses and all orders for this visit:    DM type 2, uncontrolled, with neuropathy ([facility]  -     AMB POC GLUCOSE BLOOD, BY GLUCOSE MONITORING DEVICE  -     insulin aspart protamine/insulin aspart (NovoLOG Mix 70-30FlexPen U-100) 100 unit/mL (70-30) inpn; 40 units at noon (first meal) and 40 units at 7 pm (second meal), Print, Disp-10 Pen, R-6  -     insulin aspart protamine/insulin aspart (NovoLOG Mix 70-30FlexPen U-100) 100 unit/mL (70-30) inpn; 40 units at noon (first meal) and 40 units at 7 pm (second meal), Normal, Disp-10 Pen, R-6    Hypertension, well controlled    Mixed hypercholesterolemia and hypertriglyceridemia    Vitamin D deficiency    Encounter for smoking cessation counseling  -     varenicline (CHANTIX STARTER SUE) 0.5 mg (11)- 1 mg (42) DsPk; Take as directed, Normal, Disp-1 Dose Pack, R-0    Mental impairment  -     REFERRAL TO NEUROPSYCHOLOGY    Other orders  -     Cancel: varenicline (CHANTIX) 0.5 mg tablet; Take 1 Tab by mouth two (2) times a day for 90 days. , Normal, Disp-60 Tab, R-2  -     Cancel: nicotine (NICODERM CQ) 14 mg/24 hr patch; 1 Patch by TransDERmal route every twenty-four (24) hours for 30 days. , Normal, Disp-30 Patch, R-0      Patient Instructions        Stopping Smoking: Care Instructions  Your Care Instructions     Cigarette smokers crave the nicotine in cigarettes. Giving it up is much harder than simply changing a habit. Your body has to stop craving the nicotine. It is hard to quit, but you can do it. There are many tools that people use to quit smoking. You may find that combining tools works best for you. There are several steps to quitting. First you get ready to quit. Then you get support to help you. After that, you learn new skills and behaviors to become a nonsmoker. For many people, a necessary step is getting and using medicine. Your doctor will help you set up the plan that best meets your needs.  You may want to attend a smoking cessation program to help you quit smoking. When you choose a program, look for one that has proven success. Ask your doctor for ideas. You will greatly increase your chances of success if you take medicine as well as get counseling or join a cessation program.  Some of the changes you feel when you first quit tobacco are uncomfortable. Your body will miss the nicotine at first, and you may feel short-tempered and grumpy. You may have trouble sleeping or concentrating. Medicine can help you deal with these symptoms. You may struggle with changing your smoking habits and rituals. The last step is the tricky one: Be prepared for the smoking urge to continue for a time. This is a lot to deal with, but keep at it. You will feel better. Follow-up care is a key part of your treatment and safety. Be sure to make and go to all appointments, and call your doctor if you are having problems. It's also a good idea to know your test results and keep a list of the medicines you take. How can you care for yourself at home? · Ask your family, friends, and coworkers for support. You have a better chance of quitting if you have help and support. · Join a support group, such as Nicotine Anonymous, for people who are trying to quit smoking. · Consider signing up for a smoking cessation program, such as the American Lung Association's Freedom from Smoking program.  · Get text messaging support. Go to the website at www.smokefree. gov to sign up for the [facility] program.  · Set a quit date. Pick your date carefully so that it is not right in the middle of a big deadline or stressful time. Once you quit, do not even take a puff. Get rid of all ashtrays and lighters after your last cigarette. Clean your house and your clothes so that they do not smell of smoke. · Learn how to be a nonsmoker. Think about ways you can avoid those things that make you reach for a cigarette. ?  Avoid situations that put you at greatest risk for smoking. For some people, it is hard to have a drink with friends without smoking. For others, they might skip a coffee break with coworkers who smoke. ? Change your daily routine. Take a different route to work or eat a meal in a different place. · Cut down on stress. Calm yourself or release tension by doing an activity you enjoy, such as reading a book, taking a hot bath, or gardening. · Talk to your doctor or pharmacist about nicotine replacement therapy, which replaces the nicotine in your body. You still get nicotine but you do not use tobacco. Nicotine replacement products help you slowly reduce the amount of nicotine you need. These products come in several forms, many of them available over-the-counter:  ? Nicotine patches  ? Nicotine gum and lozenges  ? Nicotine inhaler  · Ask your doctor about bupropion (Wellbutrin) or varenicline (Chantix), which are prescription medicines. They do not contain nicotine. They help you by reducing withdrawal symptoms, such as stress and anxiety. · Some people find hypnosis, acupuncture, and massage helpful for ending the smoking habit. · Eat a healthy diet and get regular exercise. Having healthy habits will help your body move past its craving for nicotine. · Be prepared to keep trying. Most people are not successful the first few times they try to quit. Do not get mad at yourself if you smoke again. Make a list of things you learned and think about when you want to try again, such as next week, next month, or next year. Where can you learn more? Go to http://www.gray.com/  Enter [id number] in the search box to learn more about \"Stopping Smoking: Care Instructions. \"  Current as of: March 12, 2020               Content Version: 12.6  © 3606-9303 [employer]. Care instructions adapted under license by [employer] (which disclaims liability or warranty for this information).  If you have questions about a medical condition or this instruction, always ask your healthcare professional. [place] [address] any warranty or liability for your use of this information. Follow-up and Dispositions    · Return 2-3 weeks, for routine follow up.

## 2021-02-08 NOTE — PATIENT INSTRUCTIONS
Stopping Smoking: Care Instructions  Your Care Instructions     Cigarette smokers crave the nicotine in cigarettes. Giving it up is much harder than simply changing a habit. Your body has to stop craving the nicotine. It is hard to quit, but you can do it. There are many tools that people use to quit smoking. You may find that combining tools works best for you. There are several steps to quitting. First you get ready to quit. Then you get support to help you. After that, you learn new skills and behaviors to become a nonsmoker. For many people, a necessary step is getting and using medicine. Your doctor will help you set up the plan that best meets your needs. You may want to attend a smoking cessation program to help you quit smoking. When you choose a program, look for one that has proven success. Ask your doctor for ideas. You will greatly increase your chances of success if you take medicine as well as get counseling or join a cessation program.  Some of the changes you feel when you first quit tobacco are uncomfortable. Your body will miss the nicotine at first, and you may feel short-tempered and grumpy. You may have trouble sleeping or concentrating. Medicine can help you deal with these symptoms. You may struggle with changing your smoking habits and rituals. The last step is the tricky one: Be prepared for the smoking urge to continue for a time. This is a lot to deal with, but keep at it. You will feel better. Follow-up care is a key part of your treatment and safety. Be sure to make and go to all appointments, and call your doctor if you are having problems. It's also a good idea to know your test results and keep a list of the medicines you take. How can you care for yourself at home? · Ask your family, friends, and coworkers for support. You have a better chance of quitting if you have help and support.   · Join a support group, such as Nicotine Anonymous, for people who are trying to quit smoking. · Consider signing up for a smoking cessation program, such as the American Lung Association's Freedom from Smoking program.  · Get text messaging support. Go to the website at www.smokefree. gov to sign up for the [facility] program.  · Set a quit date. Pick your date carefully so that it is not right in the middle of a big deadline or stressful time. Once you quit, do not even take a puff. Get rid of all ashtrays and lighters after your last cigarette. Clean your house and your clothes so that they do not smell of smoke. · Learn how to be a nonsmoker. Think about ways you can avoid those things that make you reach for a cigarette. ? Avoid situations that put you at greatest risk for smoking. For some people, it is hard to have a drink with friends without smoking. For others, they might skip a coffee break with coworkers who smoke. ? Change your daily routine. Take a different route to work or eat a meal in a different place. · Cut down on stress. Calm yourself or release tension by doing an activity you enjoy, such as reading a book, taking a hot bath, or gardening. · Talk to your doctor or pharmacist about nicotine replacement therapy, which replaces the nicotine in your body. You still get nicotine but you do not use tobacco. Nicotine replacement products help you slowly reduce the amount of nicotine you need. These products come in several forms, many of them available over-the-counter:  ? Nicotine patches  ? Nicotine gum and lozenges  ? Nicotine inhaler  · Ask your doctor about bupropion (Wellbutrin) or varenicline (Chantix), which are prescription medicines. They do not contain nicotine. They help you by reducing withdrawal symptoms, such as stress and anxiety. · Some people find hypnosis, acupuncture, and massage helpful for ending the smoking habit. · Eat a healthy diet and get regular exercise. Having healthy habits will help your body move past its craving for nicotine.   · Be prepared to keep trying. Most people are not successful the first few times they try to quit. Do not get mad at yourself if you smoke again. Make a list of things you learned and think about when you want to try again, such as next week, next month, or next year. Where can you learn more? Go to http://www.gray.com/  Enter [id number] in the search box to learn more about \"Stopping Smoking: Care Instructions. \"  Current as of: March 12, 2020               Content Version: 12.6  © 3379-9701 [employer]. Care instructions adapted under license by [employer] (which disclaims liability or warranty for this information). If you have questions about a medical condition or this instruction, always ask your healthcare professional. [address] any warranty or liability for your use of this information.

## 2021-02-12 ENCOUNTER — VIRTUAL VISIT (OUTPATIENT)
Dept: ENDOCRINOLOGY | Age: 66
End: 2021-02-12
Payer: MEDICAID

## 2021-02-12 DIAGNOSIS — Z79.4 TYPE 2 DIABETES MELLITUS WITH HYPERGLYCEMIA, WITH LONG-TERM CURRENT USE OF INSULIN (HCC): Primary | ICD-10-CM

## 2021-02-12 DIAGNOSIS — E11.65 TYPE 2 DIABETES MELLITUS WITH HYPERGLYCEMIA, WITH LONG-TERM CURRENT USE OF INSULIN (HCC): Primary | ICD-10-CM

## 2021-02-12 PROCEDURE — 99213 OFFICE O/P EST LOW 20 MIN: CPT | Performed by: INTERNAL MEDICINE

## 2021-02-12 NOTE — PROGRESS NOTES
This is an established visit conducted via telemedicine using [employer] video. The patient has been instructed that this meets HIPAA criteria ,that they may receive a bill for these services and acknowledges and agrees to this method of visitation.     This is a 70-year-old [place] female who we have seen once before in January 2018. She has had diabetes for roughly 20 years and was diagnosed after the death of her son in the [address] that time she was taking Humalog insulin plus NovoLog 70/30 insulin.  We eliminated the Humalog and encouraged her to take 20 units of 70/30 insulin in the morning and 40 units in the evening based on the fact that she told us via  that her largest meal was the evening meal.        Our last visit was in November 2020. Her A1c most recently is 9.2%. Other laboratory tests included a glucose of 323 and a creatinine of 0.89.     Current Diabetes Medication  Metformin 1000 mg BID  70/30 insulin 40 units BID     She presents today with one of her granddaughters; she served as . When I saw her last, it became apparent that the patient was taking her insulin after her meals instead of before her meals. According to her granddaughter, the patient is now taking her insulin prior to each of her 2 meals. She now has fasting blood sugars ranging between 120 and 160 and predinner blood sugars ranging between 190 and 210.    Both meals are usually flat bread usually with eggs in the morning and fried fish or soup with potatoes or other vegetables in the evening. I learned today that the flat bread is made from rice which is a special bread compared to a bread made with wheat flour. Since leaving [place] the patient has treated herself with this type of flat bread. According to the granddaughter, this usually really raises her blood sugar up and this is been a problem that they have not been able to address with her.   In any case she is primarily having the Roti for breakfast and not for dinner. Composition of the meals is really unchanged. Examination  GENERAL: NCAT, Appears well nourished   EYES: EOMI, non-icteric, no proptosis   Ear/Nose/Throat: NCAT, no visible inflammation or masses   CARDIOVASCULAR: no cyanosis, no visible JVD   RESPIRATORY: comfortable respirations observed, no cyanosis   MUSCULOSKELETAL: Normal ROM of upper extremities observed   SKIN: No edema, rash, or other significant changes observed   NEUROLOGIC:  AAOx3   PSYCHIATRIC: Normal affect, Normal insight and judgement       Impression type 2 diabetes mellitus with apparently improving glucose control primarily because she is taking her insulin before her meal instead of after her meal.  Plan:   1. I did not change the regimen.     2. We will see her back in 4 months at her request.

## 2021-05-07 ENCOUNTER — OFFICE VISIT (OUTPATIENT)
Dept: FAMILY MEDICINE CLINIC | Age: 66
End: 2021-05-07
Payer: MEDICAID

## 2021-05-07 VITALS
HEIGHT: 64 IN | SYSTOLIC BLOOD PRESSURE: 99 MMHG | OXYGEN SATURATION: 98 % | WEIGHT: 209 LBS | TEMPERATURE: 97.1 F | BODY MASS INDEX: 35.68 KG/M2 | DIASTOLIC BLOOD PRESSURE: 66 MMHG | HEART RATE: 85 BPM | RESPIRATION RATE: 20 BRPM

## 2021-05-07 DIAGNOSIS — F33.9 MAJOR DEPRESSIVE DISORDER, RECURRENT EPISODE WITH ANXIOUS DISTRESS (HCC): ICD-10-CM

## 2021-05-07 DIAGNOSIS — E11.9 DIABETIC EYE EXAM (HCC): ICD-10-CM

## 2021-05-07 DIAGNOSIS — E78.2 MIXED HYPERCHOLESTEROLEMIA AND HYPERTRIGLYCERIDEMIA: ICD-10-CM

## 2021-05-07 DIAGNOSIS — E11.9 ENCOUNTER FOR DIABETIC FOOT EXAM (HCC): ICD-10-CM

## 2021-05-07 DIAGNOSIS — Z01.00 DIABETIC EYE EXAM (HCC): ICD-10-CM

## 2021-05-07 DIAGNOSIS — E66.01 SEVERE OBESITY (HCC): ICD-10-CM

## 2021-05-07 DIAGNOSIS — I10 HYPERTENSION, WELL CONTROLLED: ICD-10-CM

## 2021-05-07 DIAGNOSIS — B37.89 CANDIDA RASH OF GROIN: ICD-10-CM

## 2021-05-07 LAB
GLUCOSE POC: 427 MG/DL
HBA1C MFR BLD HPLC: 11.1 %

## 2021-05-07 PROCEDURE — 83036 HEMOGLOBIN GLYCOSYLATED A1C: CPT | Performed by: INTERNAL MEDICINE

## 2021-05-07 PROCEDURE — 82962 GLUCOSE BLOOD TEST: CPT | Performed by: INTERNAL MEDICINE

## 2021-05-07 PROCEDURE — 99214 OFFICE O/P EST MOD 30 MIN: CPT | Performed by: INTERNAL MEDICINE

## 2021-05-07 RX ORDER — CHLORPHENIRAMINE MALEATE 4 MG
TABLET ORAL 2 TIMES DAILY
Qty: 60 G | Refills: 1 | Status: SHIPPED | OUTPATIENT
Start: 2021-05-07 | End: 2021-08-26 | Stop reason: SDUPTHER

## 2021-05-07 RX ORDER — FLUCONAZOLE 100 MG/1
100 TABLET ORAL DAILY
Qty: 7 TAB | Refills: 0 | Status: SHIPPED | OUTPATIENT
Start: 2021-05-07 | End: 2021-05-14

## 2021-05-07 NOTE — PROGRESS NOTES
Chief Complaint   Patient presents with    Follow-up     rash on body / need another different doctor     Vaginal Itching     HPI: Patient had an interpretor to translate #[id number]   Lesly Elizalde is a 72 y.o. female poorly controlled diabetes, hypertension, hypercholesterolemia presents for follow-up accompanied by daughter. Patient has concern for itchy rash on body, groin and Vaginal Itching. Explained that rash is due uncontrolled diabetes. As long as A1C and glucose are high as in 11% and 256mg/dl rash will persist.    Review of Systems  As per hpi    Past Medical History:   Diagnosis Date    Arthritis     shoulders and knees.     Diabetes ([address]     insulin dependent     Diabetes ([address]     Hypercholesterolemia     Hypertension      Past Surgical History:   Procedure Laterality Date    COLONOSCOPY N/A 2019    COLONOSCOPY performed by Marly Oconnor MD at [facility] ENDOSCOPY    HX COLONOSCOPY      HX GYN      hysterectomy    HX GYN      hysterectomy, total for fibroid    TX ABDOMEN SURGERY PROC UNLISTED           Social History     Socioeconomic History    Marital status:      Spouse name: Not on file    Number of children: Not on file    Years of education: Not on file    Highest education level: Not on file   Tobacco Use    Smoking status: Current Every Day Smoker     Packs/day: 2.00     Years: 41.00     Pack years: 82.00     Types: Cigarettes    Smokeless tobacco: Never Used   Substance and Sexual Activity    Alcohol use: No     Frequency: Never     Binge frequency: Never    Drug use: No    Sexual activity: Never   Social History Narrative    ** Merged History Encounter **          Family History   Problem Relation Age of Onset    Diabetes Mother     Diabetes Father     Diabetes Sister     Diabetes Brother      Current Outpatient Medications   Medication Sig Dispense Refill    insulin aspart protamine/insulin aspart (NovoLOG Mix 70-30FlexPen U-100) 100 unit/mL (70-30) inpn 40 units at noon (first meal) and 40 units at 7 pm (second meal) 10 Pen 6    insulin aspart protamine/insulin aspart (NovoLOG Mix 70-30FlexPen U-100) 100 unit/mL (70-30) inpn 40 units at noon (first meal) and 40 units at 7 pm (second meal) 10 Pen 6    cholecalciferol (VITAMIN D3) (5000 Units /125 mcg) capsule TAKE 1 CAPSULE BY MOUTH EVERY DAY 30 Cap 1    Blood-Glucose Meter (FreeStyle System Kit) monitoring kit Monitor blood sugar 3 times daily 1 Kit 0    glucose blood VI test strips (FreeStyle Test) strip Monitor blood sugar 3 times daily. E11.65, Z79.4 200 Strip 10    glucose blood VI test strips (Freestyle [employer]) strip Check sugar 3 times a day 300 Strip 4    FreeStyle Lancets 28 gauge misc TESTING 3 TIMES A DAY. E11.65 100 Lancet 2    buPROPion SR (WELLBUTRIN SR) 150 mg SR tablet TAKE 1 TAB DAILY X 1 WEEK THEN INCREASE TO 1 TABLET TWICE DAILY- FOR DEPRESSION & SMOKING 60 Tab 5    budesonide-formoteroL (SYMBICORT) 80-4.5 mcg/actuation HFAA Take 2 Puffs by inhalation two (2) times a day. 1 Inhaler 2    acetaminophen (TYLENOL) 325 mg tablet TAKE 3 TABLETS BY MOUTH EVERY 8 HOURS      lidocaine (LIDODERM) 5 % APPLYT 1 PATCH TOPICALLY EVERY DAY      ibuprofen (MOTRIN) 400 mg tablet TAKE 1 TABLET BY MOUTH EVERY 4 HOURS      albuterol (PROVENTIL HFA, VENTOLIN HFA, PROAIR HFA) 90 mcg/actuation inhaler Take 1 Puff by inhalation every six (6) hours as needed for Wheezing. 1 Inhaler 1    gabapentin (NEURONTIN) 100 mg capsule Take 2 Caps by mouth three (3) times daily. 90 Cap 0    Insulin Needles, Disposable, (BD ULTRA-FINE MINI PEN NEEDLE) 31 gauge x 3/16\" ndle Use to inject insulin twice daily 200 Pen Needle 3    lisinopril (PRINIVIL, ZESTRIL) 5 mg tablet Take 1 Tab by mouth daily. 90 Tab 1    lancets 32 gauge misc 1 Each by Does Not Apply route three (3) times daily.  300 Lancet 3    Blood-Glucose Meter (FREESTYLE FLASH SYSTEM) monitoring kit Test 3 Times Daily   DX E11.40,  E11.65 1 Kit 0  varenicline (CHANTIX STARTER SUE) 0.5 mg (11)- 1 mg (42) DsPk Take as directed 1 Dose Pack 0    pravastatin (PRAVACHOL) 20 mg tablet TAKE 1 TABLET BY MOUTH EVERY DAY AT NIGHT 30 Tab 4    methocarbamoL (ROBAXIN) 750 mg tablet Take 1 Tab by mouth four (4) times daily. 30 Tab 1    metFORMIN (GLUCOPHAGE) 1,000 mg tablet TAKE 1 TABLET BY MOUTH TWICE A DAY WITH MEALS 60 Tab 3    meloxicam (MOBIC) 15 mg tablet Take 1 Tab by mouth daily. 20 Tab 1    Omeprazole delayed release (PRILOSEC D/R) 20 mg tablet Take 1 Tab by mouth daily as needed (acid reflux).  90 Tab 0     No Known Allergies    Objective:  Visit Vitals  BP 99/66   Pulse 85   Temp 97.1 °F (36.2 °C) (Oral)   Resp 20   Ht 5' 4\" (1.626 m)   Wt 209 lb (94.8 kg)   LMP  (LMP Unknown)   SpO2 98%   BMI 35.87 kg/m²     Physical Exam:   General appearance - alert, well appearing in no distress  Mental status - alert, oriented to person, place, and time  Neck - supple, no significant adenopathy   Chest - clear to auscultation, no wheezes, rales or rhonchi  Heart - normal rate, regular rhythm, no murmurs  Abdomen - soft, nontender, nondistended, no organomegaly  Ext-peripheral pulses normal, no pedal edema  Skin-candida skin rash of pelvis    Results for orders placed or performed in visit on 02/08/21   AMB POC GLUCOSE BLOOD, BY GLUCOSE MONITORING DEVICE   Result Value Ref Range    Glucose  mg/dL       Assessment/Plan:  Diagnoses and all orders for this visit:    DM type 2, uncontrolled, with neuropathy (HCC)  -     AMB POC HEMOGLOBIN A1C  -     AMB POC GLUCOSE BLOOD, BY GLUCOSE MONITORING DEVICE  -     REFERRAL TO DIABETIC EDUCATION; Standing    Major depressive disorder, recurrent episode with anxious distress [facility]    Severe obesity ([facility]    Hypertension, well controlled    Mixed hypercholesterolemia and hypertriglyceridemia    Diabetic eye exam ([facility]    Encounter for diabetic foot exam [facility]  -      DIABETES FOOT EXAM    Candida rash of groin  - clotrimazole (LOTRIMIN) 1 % topical cream; Apply  to affected area two (2) times a day., Normal, Disp-60 g, R-1  -     fluconazole (DIFLUCAN) 100 mg tablet; Take 1 Tab by mouth daily for 7 days. FDA advises cautious prescribing of oral fluconazole in pregnancy. , Normal, Disp-7 Tab, R-0      Patient Instructions        Body Mass Index: Care Instructions  Your Care Instructions     Body mass index (BMI) can help you see if your weight is raising your risk for health problems. It uses a formula to compare how much you weigh with how tall you are. · A BMI lower than 18.5 is considered underweight. · A BMI between 18.5 and 24.9 is considered healthy. · A BMI between 25 and 29.9 is considered overweight. A BMI of 30 or higher is considered obese. If your BMI is in the normal range, it means that you have a lower risk for weight-related health problems. If your BMI is in the overweight or obese range, you may be at increased risk for weight-related health problems, such as high blood pressure, heart disease, stroke, arthritis or joint pain, and diabetes. If your BMI is in the underweight range, you may be at increased risk for health problems such as fatigue, lower protection (immunity) against illness, muscle loss, bone loss, hair loss, and hormone problems. BMI is just one measure of your risk for weight-related health problems. You may be at higher risk for health problems if you are not active, you eat an unhealthy diet, or you drink too much alcohol or use tobacco products. Follow-up care is a key part of your treatment and safety. Be sure to make and go to all appointments, and call your doctor if you are having problems. It's also a good idea to know your test results and keep a list of the medicines you take. How can you care for yourself at home? · Practice healthy eating habits. This includes eating plenty of fruits, vegetables, whole grains, lean protein, and low-fat dairy.   · If your doctor recommends it, get more exercise. Walking is a good choice. Bit by bit, increase the amount you walk every day. Try for at least 30 minutes on most days of the week. · Do not smoke. Smoking can increase your risk for health problems. If you need help quitting, talk to your doctor about stop-smoking programs and medicines. These can increase your chances of quitting for good. · Limit alcohol to 2 drinks a day for men and 1 drink a day for women. Too much alcohol can cause health problems. If you have a BMI higher than 25  · Your doctor may do other tests to check your risk for weight-related health problems. This may include measuring the distance around your waist. A waist measurement of more than 40 inches in men or 35 inches in women can increase the risk of weight-related health problems. · Talk with your doctor about steps you can take to stay healthy or improve your health. You may need to make lifestyle changes to lose weight and stay healthy, such as changing your diet and getting regular exercise. If you have a BMI lower than 18.5  · Your doctor may do other tests to check your risk for health problems. · Talk with your doctor about steps you can take to stay healthy or improve your health. You may need to make lifestyle changes to gain or maintain weight and stay healthy, such as getting more healthy foods in your diet and doing exercises to build muscle. Where can you learn more? Go to http://www.carrera.com/  Enter S176 in the search box to learn more about \"Body Mass Index: Care Instructions. \"  Current as of: September 23, 2020               Content Version: 12.8  © 2006-2021 Healthwise, Incorporated. Care instructions adapted under license by [employer] (which disclaims liability or warranty for this information).  If you have questions about a medical condition or this instruction, always ask your healthcare professional. Mohit Ramirez disclaims any warranty or liability for your use of this information. Follow-up and Dispositions    · Return in about 4 weeks (around 6/4/2021), or if symptoms worsen or fail to improve, for f/u treatment.                \

## 2021-05-07 NOTE — PATIENT INSTRUCTIONS
Body Mass Index: Care Instructions  Your Care Instructions     Body mass index (BMI) can help you see if your weight is raising your risk for health problems. It uses a formula to compare how much you weigh with how tall you are. · A BMI lower than 18.5 is considered underweight. · A BMI between 18.5 and 24.9 is considered healthy. · A BMI between 25 and 29.9 is considered overweight. A BMI of 30 or higher is considered obese. If your BMI is in the normal range, it means that you have a lower risk for weight-related health problems. If your BMI is in the overweight or obese range, you may be at increased risk for weight-related health problems, such as high blood pressure, heart disease, stroke, arthritis or joint pain, and diabetes. If your BMI is in the underweight range, you may be at increased risk for health problems such as fatigue, lower protection (immunity) against illness, muscle loss, bone loss, hair loss, and hormone problems. BMI is just one measure of your risk for weight-related health problems. You may be at higher risk for health problems if you are not active, you eat an unhealthy diet, or you drink too much alcohol or use tobacco products. Follow-up care is a key part of your treatment and safety. Be sure to make and go to all appointments, and call your doctor if you are having problems. It's also a good idea to know your test results and keep a list of the medicines you take. How can you care for yourself at home? · Practice healthy eating habits. This includes eating plenty of fruits, vegetables, whole grains, lean protein, and low-fat dairy. · If your doctor recommends it, get more exercise. Walking is a good choice. Bit by bit, increase the amount you walk every day. Try for at least 30 minutes on most days of the week. · Do not smoke. Smoking can increase your risk for health problems. If you need help quitting, talk to your doctor about stop-smoking programs and medicines. These can increase your chances of quitting for good. · Limit alcohol to 2 drinks a day for men and 1 drink a day for women. Too much alcohol can cause health problems. If you have a BMI higher than 25  · Your doctor may do other tests to check your risk for weight-related health problems. This may include measuring the distance around your waist. A waist measurement of more than 40 inches in men or 35 inches in women can increase the risk of weight-related health problems. · Talk with your doctor about steps you can take to stay healthy or improve your health. You may need to make lifestyle changes to lose weight and stay healthy, such as changing your diet and getting regular exercise. If you have a BMI lower than 18.5  · Your doctor may do other tests to check your risk for health problems. · Talk with your doctor about steps you can take to stay healthy or improve your health. You may need to make lifestyle changes to gain or maintain weight and stay healthy, such as getting more healthy foods in your diet and doing exercises to build muscle. Where can you learn more? Go to http://www.carrera.com/  Enter S176 in the search box to learn more about \"Body Mass Index: Care Instructions. \"  Current as of: September 23, 2020               Content Version: 12.8  © 2006-2021 Healthwise, Incorporated. Care instructions adapted under license by [employer] (which disclaims liability or warranty for this information). If you have questions about a medical condition or this instruction, always ask your healthcare professional. [place] [address] any warranty or liability for your use of this information.

## 2021-07-20 NOTE — TELEPHONE ENCOUNTER
----- Message from Letha Cardoza sent at 9/24/2019 11:08 AM EDT -----  Regarding: Dr. Marshall Del: Chuck Toribio, with [facility]  Reason for call: Needs an order for incontinence supplies.    Callback requested: YES  Best contact: [phone]  FAX: [phone]  Additional details: [facility]                                                                                          Hospitalist Progress Note  Patt Hatchet, MD  Answering service: [phone] from in house phone        Date of Service:  2021  NAME:  Stefanie Blake  :  1950  MRN:  [id number]    This documentation was facilitated by a Voice Recognition software and may contain inadvertent typographical errors. Admission Summary:   Patient presented to the ED on  with shortness of breath and fever and she was found to have Covid pneumonia. She is not vaccinated    Interval history / Subjective:        Patient was agitated and trying to leave the hospital so code atlas was activated this morning. She had received a dose of 1 mg IV haloperidol. On my round this afternoon, she is calmer, asking when she could go home  Assessment & Plan:   Acute hypoxic respiratory failure due to SARS-CoV-2 infection pneumonia  CTA no PE but findings consistent with atypical viral pneumonia  --Continue dexamethasone for 10 days or until discharge,whichever comes first.Completed Remdesivir  --Acterma x 1 dose. --No evidence of bacterial pneumonia/infetion,d/cd  emperic Abx ,  --Follow inflammatory markers. --DVT prophylaxis per Covid protocol. D-dimer 0.65 on 7/15  --On oxygen via high flow nasal cannula wean off as able to keep SPO2> 90%,patient poorly complaint with keeping the HFNC.  --Pulmonary following. --Continue IV diuretics. Uncontrolled DM with hyperglycemia precipitated by steroid and acute illness  A1C10.3  She mentions not taking any meds currently  Increase Lantus to 35 units nightly. Added Premeal Humalog 8 units AC 3 times daily. DTC help appreciated  Humalog sliding scale coverage. Elevated creatinine: Normalized  --Unknown baseline.       Uncontrolled HTN:BP not on target,but fairly stable mainly in the 484-592A systolically  C/w Metoprolol, amlodipine  Hydralazine prn    Agitated delirium  --Taking off oxygen from time to time, her oxygen drops quickly when she does that. The importance of keeping the oxygen on and risks and complications hypoxia explained to patient and family. May use low-dose as needed haloperidol    Access: PICC line placed on 7/15     DVT ppx: Lovenox per Covid protocol  CODE STATUS: She wishes to be PARTIAL CODE. She wants CPR but NO intubation and mechanical ventilation. Isolation: Droplet plus  Family update  --discussed with her daughter Zhanna Felix at the bedside on 7/19. Her #[phone]   --Kimberly Carvajal @[phone]       Morbid obesity  Estimated discharge date: Patient still requiring high flow oxygen. Hospital Problems  Never Reviewed        Codes Class Noted POA    COVID-19 ICD-10-CM: U07.1  ICD-9-CM: 079.89  7/13/2021 Unknown                Review of Systems:   A comprehensive review of systems was negative except for that written in the HPI. Vital Signs:    Last 24hrs VS reviewed since prior progress note. Most recent are:  Visit Vitals  BP (!) 148/60 (BP 1 Location: Left upper arm, BP Patient Position: At rest)   Pulse 85   Temp 98.3 °F (36.8 °C)   Resp 21   Ht 5' 5\" (1.651 m)   Wt 75.8 kg (167 lb)   SpO2 100%   BMI 27.79 kg/m²       No intake or output data in the 24 hours ending 07/20/21 1623     Physical Examination:     I had a face to face encounter with this patient and independently examined them on7/20/2021 as outlined below:        Constitutional:  Alert and oriented, bit calmer today. HEENT:  Atraumatic. Oral mucosa moist,. Non icteric sclera. No pallor. Resp:  On oxygen via high flow nasal cannula: Symmetrical air entry. Chest Wall: No deformity   CV:  Regular rhythm, normal rate, no murmurs, gallops, rubs    GI:  Soft, non distended, non tender.  normoactive bowel sounds, no hepatosplenomegaly    :  No CVA or suprapubic tenderness    Musculoskeletal:  No edema, warm, 2+ pulses throughout Neurologic:  Mental status:AAOx3,agitated. Cranial nerves II-XII : WNL  Motor exam:Moves all extremities symmetrically              Data Review:    Review and/or order of clinical lab test  Review and/or order of tests in the radiology section of CPT  Review and/or order of tests in the medicine section of CPT      Labs:     Recent Labs     07/20/21  0024 07/19/21 0232   WBC 10.8 7.3   HGB 12.0 11.7   HCT 36.1 37.1    373     Recent Labs     07/20/21  0024 07/19/21 0232 07/18/21  0224   * 141 142   K 3.7 3.6 3.6    107 108   CO2 23 28 26   BUN 31* 31* 24*   CREA 1.19* 1.21* 1.09*   * 208* 149*   CA 8.5 8.7 9.0     Recent Labs     07/20/21 0024 07/19/21 0232 07/18/21 0224   ALT 38 32 21   AP 58 62 64   TBILI 0.4 0.5 0.4   TP 6.6 6.6 7.3   ALB 2.8* 2.8* 3.0*   GLOB 3.8 3.8 4.3*     No results for input(s): INR, PTP, APTT, INREXT, INREXT in the last 72 hours. No results for input(s): FE, TIBC, PSAT, FERR in the last 72 hours. No results found for: FOL, RBCF   No results for input(s): PH, PCO2, PO2 in the last 72 hours. No results for input(s): CPK, CKNDX, TROIQ in the last 72 hours.     No lab exists for component: CPKMB  No results found for: CHOL, CHOLX, CHLST, CHOLV, HDL, HDLP, LDL, LDLC, DLDLP, TGLX, TRIGL, TRIGP, CHHD, CHHDX  Lab Results   Component Value Date/Time    Glucose (POC) 208 (H) 07/20/2021 12:16 PM    Glucose (POC) 209 (H) 07/20/2021 08:50 AM    Glucose (POC) 227 (H) 07/19/2021 09:39 PM    Glucose (POC) 207 (H) 07/19/2021 05:11 PM    Glucose (POC) 274 (H) 07/19/2021 10:54 AM     No results found for: COLOR, APPRN, SPGRU, REFSG, KEE, PROTU, GLUCU, KETU, BILU, UROU, GAUTAM, LEUKU, GLUKE, EPSU, BACTU, WBCU, RBCU, CASTS, UCRY      Medications Reviewed:     Current Facility-Administered Medications   Medication Dose Route Frequency    insulin lispro (HUMALOG) injection 8 Units  8 Units SubCUTAneous TID WITH MEALS    insulin glargine (LANTUS) injection 35 Units  35 Units SubCUTAneous QHS    enoxaparin (LOVENOX) injection 30 mg  30 mg SubCUTAneous Q12H    haloperidol lactate (HALDOL) injection 1 mg  1 mg IntraVENous Q6H PRN    furosemide (LASIX) injection 40 mg  40 mg IntraVENous DAILY    L.acidophilus-paracasei-S.thermophil-bifidobacter (RISAQUAD) 8 billion cell capsule  1 Capsule Oral DAILY    insulin lispro (HUMALOG)   SubCUTAneous AC&HS    glucagon (GLUCAGEN) injection 1 mg  1 mg IntraMUSCular PRN    dextrose (D50W) injection syrg 12.5 g  25 mL IntraVENous PRN    glucose chewable tablet 12 g  3 Tablet Oral PRN    amLODIPine (NORVASC) tablet 10 mg  10 mg Oral DAILY    metoprolol tartrate (LOPRESSOR) tablet 50 mg  50 mg Oral Q12H    dexamethasone (PF) (DECADRON) 10 mg/mL injection 6 mg  6 mg IntraVENous Q24H    sodium chloride (NS) flush 5-40 mL  5-40 mL IntraVENous Q8H    sodium chloride (NS) flush 5-40 mL  5-40 mL IntraVENous PRN    acetaminophen (TYLENOL) tablet 650 mg  650 mg Oral Q4H PRN    zinc sulfate (ZINCATE) 50 mg zinc (220 mg) capsule 1 Capsule  1 Capsule Oral DAILY    ascorbic acid (vitamin C) (VITAMIN C) tablet 500 mg  500 mg Oral BID    aspirin chewable tablet 81 mg  81 mg Oral DAILY    hydrALAZINE (APRESOLINE) 20 mg/mL injection 10 mg  10 mg IntraVENous Q4H PRN     ______________________________________________________________________  EXPECTED LENGTH OF STAY: 5d 9h  ACTUAL LENGTH OF STAY:          7                 Jim Douglas MD

## 2021-08-26 ENCOUNTER — OFFICE VISIT (OUTPATIENT)
Dept: FAMILY MEDICINE CLINIC | Age: 66
End: 2021-08-26
Payer: MEDICAID

## 2021-08-26 VITALS
BODY MASS INDEX: 34.66 KG/M2 | WEIGHT: 203 LBS | HEART RATE: 83 BPM | SYSTOLIC BLOOD PRESSURE: 109 MMHG | HEIGHT: 64 IN | OXYGEN SATURATION: 96 % | RESPIRATION RATE: 18 BRPM | DIASTOLIC BLOOD PRESSURE: 70 MMHG | TEMPERATURE: 97.1 F

## 2021-08-26 DIAGNOSIS — Z71.6 ENCOUNTER FOR SMOKING CESSATION COUNSELING: ICD-10-CM

## 2021-08-26 DIAGNOSIS — K21.00 GASTROESOPHAGEAL REFLUX DISEASE WITH ESOPHAGITIS WITHOUT HEMORRHAGE: ICD-10-CM

## 2021-08-26 DIAGNOSIS — M54.50 CHRONIC BILATERAL LOW BACK PAIN WITHOUT SCIATICA: ICD-10-CM

## 2021-08-26 DIAGNOSIS — Z72.0 BRONCHITIS DUE TO TOBACCO USE: ICD-10-CM

## 2021-08-26 DIAGNOSIS — K59.00 CONSTIPATION, UNSPECIFIED CONSTIPATION TYPE: ICD-10-CM

## 2021-08-26 DIAGNOSIS — B37.89 CANDIDA RASH OF GROIN: ICD-10-CM

## 2021-08-26 DIAGNOSIS — E11.9 DIABETIC EYE EXAM (HCC): ICD-10-CM

## 2021-08-26 DIAGNOSIS — M54.2 NECK PAIN: ICD-10-CM

## 2021-08-26 DIAGNOSIS — M81.0 POSTMENOPAUSAL OSTEOPOROSIS: ICD-10-CM

## 2021-08-26 DIAGNOSIS — Z02.89 PAIN MANAGEMENT CONTRACT AGREEMENT: ICD-10-CM

## 2021-08-26 DIAGNOSIS — G89.29 CHRONIC BILATERAL LOW BACK PAIN WITHOUT SCIATICA: ICD-10-CM

## 2021-08-26 DIAGNOSIS — Z01.00 DIABETIC EYE EXAM (HCC): ICD-10-CM

## 2021-08-26 DIAGNOSIS — Z12.31 ENCOUNTER FOR SCREENING MAMMOGRAM FOR BREAST CANCER: ICD-10-CM

## 2021-08-26 DIAGNOSIS — I10 HYPERTENSION, WELL CONTROLLED: ICD-10-CM

## 2021-08-26 DIAGNOSIS — E78.2 MIXED HYPERCHOLESTEROLEMIA AND HYPERTRIGLYCERIDEMIA: ICD-10-CM

## 2021-08-26 DIAGNOSIS — J40 BRONCHITIS DUE TO TOBACCO USE: ICD-10-CM

## 2021-08-26 DIAGNOSIS — R35.0 FREQUENCY OF URINATION: ICD-10-CM

## 2021-08-26 DIAGNOSIS — F32.A DEPRESSION, UNSPECIFIED DEPRESSION TYPE: ICD-10-CM

## 2021-08-26 LAB
BILIRUB UR QL STRIP: NORMAL
GLUCOSE POC: 231 MG/DL
GLUCOSE UR-MCNC: NORMAL MG/DL
HBA1C MFR BLD HPLC: 9.8 %
KETONES P FAST UR STRIP-MCNC: NEGATIVE MG/DL
PH UR STRIP: 5.5 [PH] (ref 4.6–8)
PROT UR QL STRIP: NORMAL
SP GR UR STRIP: 1.03 (ref 1–1.03)
UA UROBILINOGEN AMB POC: NORMAL (ref 0.2–1)
URINALYSIS CLARITY POC: CLEAR
URINALYSIS COLOR POC: NORMAL
URINE BLOOD POC: NORMAL
URINE LEUKOCYTES POC: NEGATIVE
URINE NITRITES POC: NEGATIVE

## 2021-08-26 PROCEDURE — 99214 OFFICE O/P EST MOD 30 MIN: CPT | Performed by: INTERNAL MEDICINE

## 2021-08-26 PROCEDURE — 83036 HEMOGLOBIN GLYCOSYLATED A1C: CPT | Performed by: INTERNAL MEDICINE

## 2021-08-26 PROCEDURE — 81001 URINALYSIS AUTO W/SCOPE: CPT | Performed by: INTERNAL MEDICINE

## 2021-08-26 PROCEDURE — 82962 GLUCOSE BLOOD TEST: CPT | Performed by: INTERNAL MEDICINE

## 2021-08-26 RX ORDER — METFORMIN HYDROCHLORIDE 1000 MG/1
TABLET ORAL
Qty: 60 TABLET | Refills: 3 | Status: SHIPPED | OUTPATIENT
Start: 2021-08-26 | End: 2022-01-17 | Stop reason: SDUPTHER

## 2021-08-26 RX ORDER — PRAVASTATIN SODIUM 20 MG/1
TABLET ORAL
Qty: 30 TABLET | Refills: 4 | Status: SHIPPED | OUTPATIENT
Start: 2021-08-26 | End: 2022-01-17 | Stop reason: SDUPTHER

## 2021-08-26 RX ORDER — BUPROPION HYDROCHLORIDE 150 MG/1
TABLET, EXTENDED RELEASE ORAL
Qty: 60 TABLET | Refills: 5 | Status: SHIPPED | OUTPATIENT
Start: 2021-08-26 | End: 2022-03-07 | Stop reason: SDUPTHER

## 2021-08-26 RX ORDER — GABAPENTIN 100 MG/1
200 CAPSULE ORAL 3 TIMES DAILY
Qty: 90 CAPSULE | Refills: 0 | Status: SHIPPED | OUTPATIENT
Start: 2021-08-26 | End: 2022-03-22 | Stop reason: ALTCHOICE

## 2021-08-26 RX ORDER — ALBUTEROL SULFATE 90 UG/1
1 AEROSOL, METERED RESPIRATORY (INHALATION)
Qty: 1 INHALER | Refills: 3 | Status: SHIPPED | OUTPATIENT
Start: 2021-08-26

## 2021-08-26 RX ORDER — PHENOL/SODIUM PHENOLATE
20 AEROSOL, SPRAY (ML) MUCOUS MEMBRANE
Qty: 90 TABLET | Refills: 1 | Status: SHIPPED | OUTPATIENT
Start: 2021-08-26 | End: 2022-01-17 | Stop reason: SDUPTHER

## 2021-08-26 RX ORDER — CHLORPHENIRAMINE MALEATE 4 MG
TABLET ORAL 2 TIMES DAILY
Qty: 60 G | Refills: 1 | Status: SHIPPED | OUTPATIENT
Start: 2021-08-26 | End: 2022-03-07 | Stop reason: SDUPTHER

## 2021-08-26 RX ORDER — FLUTICASONE PROPIONATE AND SALMETEROL XINAFOATE 230; 21 UG/1; UG/1
2 AEROSOL, METERED RESPIRATORY (INHALATION) 2 TIMES DAILY
Qty: 1 EACH | Refills: 2 | Status: SHIPPED | OUTPATIENT
Start: 2021-08-26

## 2021-08-26 RX ORDER — CHOLECALCIFEROL (VITAMIN D3) 125 MCG
5000 CAPSULE ORAL DAILY
Qty: 30 CAPSULE | Refills: 3 | Status: SHIPPED | OUTPATIENT
Start: 2021-08-26 | End: 2022-03-22 | Stop reason: ALTCHOICE

## 2021-08-26 RX ORDER — POLYETHYLENE GLYCOL 3350 17 G/17G
17 POWDER, FOR SOLUTION ORAL DAILY
Qty: 30 G | Refills: 3 | Status: SHIPPED | OUTPATIENT
Start: 2021-08-26 | End: 2022-03-22 | Stop reason: ALTCHOICE

## 2021-08-26 RX ORDER — VARENICLINE TARTRATE 25 MG
KIT ORAL
Qty: 1 DOSE PACK | Refills: 0 | Status: CANCELLED | OUTPATIENT
Start: 2021-08-26

## 2021-08-26 RX ORDER — INSULIN ASPART 100 [IU]/ML
INJECTION, SUSPENSION SUBCUTANEOUS
Qty: 10 PEN | Refills: 6 | Status: SHIPPED | OUTPATIENT
Start: 2021-08-26 | End: 2022-01-17 | Stop reason: SDUPTHER

## 2021-08-26 RX ORDER — MELOXICAM 15 MG/1
15 TABLET ORAL DAILY
Qty: 20 TABLET | Refills: 1 | Status: SHIPPED | OUTPATIENT
Start: 2021-08-26 | End: 2022-03-22

## 2021-08-26 RX ORDER — LISINOPRIL 5 MG/1
5 TABLET ORAL DAILY
Qty: 90 TABLET | Refills: 1 | Status: SHIPPED | OUTPATIENT
Start: 2021-08-26 | End: 2022-03-07 | Stop reason: SDUPTHER

## 2021-08-26 NOTE — PROGRESS NOTES
Chief Complaint   Patient presents with    Bloated    Constipation    Blood sugar problem     PM blood sugar 210     HPI:  Danni Hurtado is a 77 y.o. female with h/o poorly controlled diabetes, hypertension, hypercholesterolemia presents for follow-up accompanied by son presents with c/o Bloating, Constipation, and Blood sugar problem (PM BS= 210) Patient reports that she has not been taking her medications including antidiabetic for 4 weeks because she has been out. .    Review of Systems  As per hpi    Past Medical History:   Diagnosis Date    Arthritis     shoulders and knees.  Diabetes ([address]     insulin dependent     Diabetes ([address]     Hypercholesterolemia     Hypertension      Past Surgical History:   Procedure Laterality Date    COLONOSCOPY N/A 2019    COL[facility] ENDOSCOPY    HX COLONOSCOPY      HX GYN      hysterectomy    HX GYN      hysterectomy, total for fibroid    ME ABDOMEN SURGERY PROC UNLISTED           Social History     Socioeconomic History    Marital status:      Spouse name: Not on file    Number of children: Not on file    Years of education: Not on file    Highest education level: Not on file   Tobacco Use    Smoking status: Current Every Day Smoker     Packs/day: 2.00     Years: 41.00     Pack years: 82.00     Types: Cigarettes    Smokeless tobacco: Never Used   Vaping Use    Vaping Use: Never used   Substance and Sexual Activity    Alcohol use: No    Drug use: No    Sexual activity: Never   Social History Narrative    ** Merged History Encounter **          Social Determinants of Health     Financial Resource Strain:     Difficulty of Paying Living Expenses:    Food Insecurity:     Worried About Running Out of Food in the Last Year:     Ran Out of Food in the Last Year:    Transportation Needs:     Lack of Transportation (Medical):      Lack of Transportation (Non-Medical):    Physical Activity:  Days of Exercise per Week:     Minutes of Exercise per Session:    Stress:     Feeling of Stress :    Social Connections:     Frequency of Communication with Friends and Family:     Frequency of Social Gatherings with Friends and Family:     Attends Buddhism Services:     Active Member of Clubs or Organizations:     Attends Club or Organization Meetings:     Marital Status:      Family History   Problem Relation Age of Onset    Diabetes Mother     Diabetes Father     Diabetes Sister     Diabetes Brother      Current Outpatient Medications   Medication Sig Dispense Refill    clotrimazole (LOTRIMIN) 1 % topical cream Apply  to affected area two (2) times a day. 60 g 1    varenicline (CHANTIX STARTER SUE) 0.5 mg (11)- 1 mg (42) DsPk Take as directed 1 Dose Pack 0    insulin aspart protamine/insulin aspart (NovoLOG Mix 70-30FlexPen U-100) 100 unit/mL (70-30) inpn 40 units at noon (first meal) and 40 units at 7 pm (second meal) 10 Pen 6    insulin aspart protamine/insulin aspart (NovoLOG Mix 70-30FlexPen U-100) 100 unit/mL (70-30) inpn 40 units at noon (first meal) and 40 units at 7 pm (second meal) 10 Pen 6    cholecalciferol (VITAMIN D3) (5000 Units /125 mcg) capsule TAKE 1 CAPSULE BY MOUTH EVERY DAY 30 Cap 1    Blood-Glucose Meter (FreeStyle System Kit) monitoring kit Monitor blood sugar 3 times daily 1 Kit 0    glucose blood VI test strips (FreeStyle Test) strip Monitor blood sugar 3 times daily. E11.65, Z79.4 200 Strip 10    pravastatin (PRAVACHOL) 20 mg tablet TAKE 1 TABLET BY MOUTH EVERY DAY AT NIGHT 30 Tab 4    glucose blood VI test strips (Freestyle [employer]) strip Check sugar 3 times a day 300 Strip 4    FreeStyle Lancets 28 gauge misc TESTING 3 TIMES A DAY. E11.65 100 Lancet 2    methocarbamoL (ROBAXIN) 750 mg tablet Take 1 Tab by mouth four (4) times daily.  30 Tab 1    buPROPion SR (WELLBUTRIN SR) 150 mg SR tablet TAKE 1 TAB DAILY X 1 WEEK THEN INCREASE TO 1 TABLET TWICE DAILY- FOR DEPRESSION & SMOKING 60 Tab 5    metFORMIN (GLUCOPHAGE) 1,000 mg tablet TAKE 1 TABLET BY MOUTH TWICE A DAY WITH MEALS 60 Tab 3    budesonide-formoteroL (SYMBICORT) 80-4.5 mcg/actuation HFAA Take 2 Puffs by inhalation two (2) times a day. 1 Inhaler 2    acetaminophen (TYLENOL) 325 mg tablet TAKE 3 TABLETS BY MOUTH EVERY 8 HOURS      lidocaine (LIDODERM) 5 % APPLYT 1 PATCH TOPICALLY EVERY DAY      ibuprofen (MOTRIN) 400 mg tablet TAKE 1 TABLET BY MOUTH EVERY 4 HOURS      albuterol (PROVENTIL HFA, VENTOLIN HFA, PROAIR HFA) 90 mcg/actuation inhaler Take 1 Puff by inhalation every six (6) hours as needed for Wheezing. 1 Inhaler 1    gabapentin (NEURONTIN) 100 mg capsule Take 2 Caps by mouth three (3) times daily. 90 Cap 0    Insulin Needles, Disposable, (BD ULTRA-FINE MINI PEN NEEDLE) 31 gauge x 3/16\" ndle Use to inject insulin twice daily 200 Pen Needle 3    lisinopril (PRINIVIL, ZESTRIL) 5 mg tablet Take 1 Tab by mouth daily. 90 Tab 1    meloxicam (MOBIC) 15 mg tablet Take 1 Tab by mouth daily. 20 Tab 1    lancets 32 gauge misc 1 Each by Does Not Apply route three (3) times daily. 300 Lancet 3    Blood-Glucose Meter (FREESTYLE FLASH SYSTEM) monitoring kit Test 3 Times Daily   DX E11.40,  E11.65 1 Kit 0    Omeprazole delayed release (PRILOSEC D/R) 20 mg tablet Take 1 Tab by mouth daily as needed (acid reflux).  90 Tab 0     No Known Allergies    Objective:  Visit Vitals  /70   Pulse 83   Temp 97.1 °F (36.2 °C) (Temporal)   Resp 18   Ht 5' 4\" (1.626 m)   Wt 203 lb (92.1 kg)   LMP  (LMP Unknown)   SpO2 96%   BMI 34.84 kg/m²     Physical Exam:   General appearance - alert, well appearing in no distress  Mental status - alert, oriented to person, place, and time  Neck - supple, no significant adenopathy   Chest - clear to auscultation, no wheezes, rales or rhonchi  Heart - normal rate, regular rhythm, no murmurs  Abdomen - soft, nontender, nondistended, no organomegaly  Ext-peripheral pulses normal, no pedal edema  Neuro -alert, oriented, normal speech, no focal findings or movement disorder noted    Results for orders placed or performed in visit on 08/26/21   AMB POC HEMOGLOBIN A1C   Result Value Ref Range    Hemoglobin A1c (POC) 9.8 %   AMB POC GLUCOSE BLOOD, BY GLUCOSE MONITORING DEVICE   Result Value Ref Range    Glucose  MG/DL     Assessment/Plan:  Diagnoses and all orders for this visit:    DM type 2, uncontrolled, with neuropathy (HCC)  -     AMB POC HEMOGLOBIN A1C  -     AMB POC GLUCOSE BLOOD, BY GLUCOSE MONITORING DEVICE  -     insulin aspart protamine/insulin aspart (NovoLOG Mix 70-30FlexPen U-100) 100 unit/mL (70-30) inpn; 40 units at noon (first meal) and 40 units at 7 pm (second meal), Normal, Disp-10 Pen, R-6  -     metFORMIN (GLUCOPHAGE) 1,000 mg tablet; TAKE 1 TABLET BY MOUTH TWICE A DAY WITH MEALS, Normal, Disp-60 Tablet, R-3    Bronchitis due to tobacco use  -     albuterol (PROVENTIL HFA, VENTOLIN HFA, PROAIR HFA) 90 mcg/actuation inhaler; Take 1 Puff by inhalation every six (6) hours as needed for Wheezing., Normal, Disp-1 Inhaler, R-3  -     fluticasone propion-salmeteroL (Advair HFA) 230-21 mcg/actuation inhaler; Take 2 Puffs by inhalation two (2) times a day., Normal, Disp-1 Each, R-2    Depression, unspecified depression type  -     buPROPion SR (WELLBUTRIN SR) 150 mg SR tablet; TAKE 1 TAB DAILY X 1 WEEK THEN INCREASE TO 1 TABLET TWICE DAILY- FOR DEPRESSION & SMOKING, Normal, Disp-60 Tablet, R-5DX Code Needed  . Neck pain  -     meloxicam (MOBIC) 15 mg tablet; Take 1 Tablet by mouth daily. , Normal, Disp-20 Tablet, R-1    Chronic bilateral low back pain without sciatica  -     gabapentin (NEURONTIN) 100 mg capsule; Take 2 Capsules by mouth three (3) times daily. , Normal, Disp-90 Capsule, R-0    Hypertension, well controlled  -     lisinopriL (PRINIVIL, ZESTRIL) 5 mg tablet; Take 1 Tablet by mouth daily. , Normal, Disp-90 Tablet, R-1    Mixed hypercholesterolemia and hypertriglyceridemia  -     pravastatin (PRAVACHOL) 20 mg tablet; TAKE 1 TABLET BY MOUTH EVERY DAY AT NIGHT, Normal, Disp-30 Tablet, R-4    Encounter for smoking cessation counseling  -     buPROPion SR (WELLBUTRIN SR) 150 mg SR tablet; TAKE 1 TAB DAILY X 1 WEEK THEN INCREASE TO 1 TABLET TWICE DAILY- FOR DEPRESSION & SMOKING, Normal, Disp-60 Tablet, R-5DX Code Needed  . Candida rash of groin  -     clotrimazole (LOTRIMIN) 1 % topical cream; Apply  to affected area two (2) times a day., Normal, Disp-60 g, R-1    Postmenopausal osteoporosis  -     DEXA BONE DENSITY STUDY AXIAL; Future    Frequency of urination  -     AMB POC URINALYSIS DIP STICK AUTO W/ MICRO    Encounter for screening mammogram for breast cancer  -     [facility] MAMMO BI SCREENING INCL CAD; Future    Pain management contract agreement  -     10-DRUG SCREEN, URINE W RFLX CONFIRMATION    Diabetic eye exam ([facility] [address]  -     REFERRAL TO OPHTHALMOLOGY    Constipation, unspecified constipation type  -     polyethylene glycol (MIRALAX) 17 gram/dose powder; Take 17 g by mouth daily. , Normal, Disp-30 g, R-3    Gastroesophageal reflux disease with esophagitis without hemorrhage  -     Omeprazole delayed release (PRILOSEC D/R) 20 mg tablet; Take 1 Tablet by mouth daily as needed (acid reflux). , Normal, Disp-90 Tablet, R-1    Other orders  -     cholecalciferol (VITAMIN D3) (5000 Units /125 mcg) capsule; Take 1 Capsule by mouth daily. , Normal, Disp-30 Capsule, R-3  -     Cancel: varenicline (CHANTIX STARTER SUE) 0.5 mg (11)- 1 mg (42) DsPk; Take as directed, Normal, Disp-1 Dose Pack, R-0      Patient Instructions          Constipation: Care Instructions  Your Care Instructions     Constipation means that you have a hard time passing stools (bowel movements). People pass stools from 3 times a day to once every 3 days. What is normal for you may be different. Constipation may occur with pain in the rectum and cramping. The pain may get worse when you try to pass stools. Sometimes there are small amounts of bright red blood on toilet paper or the surface of stools. This is because of enlarged veins near the rectum (hemorrhoids). A few changes in your diet and lifestyle may help you avoid ongoing constipation. Your doctor may also prescribe medicine to help loosen your stool. Some medicines can cause constipation. These include pain medicines and antidepressants. Tell your doctor about all the medicines you take. Your doctor may want to make a medicine change to ease your symptoms. Follow-up care is a key part of your treatment and safety. Be sure to make and go to all appointments, and call your doctor if you are having problems. It's also a good idea to know your test results and keep a list of the medicines you take. How can you care for yourself at home? · Drink plenty of fluids. If you have kidney, heart, or liver disease and have to limit fluids, talk with your doctor before you increase the amount of fluids you drink. · Include high-fiber foods in your diet each day. These include fruits, vegetables, beans, and whole grains. · Get at least 30 minutes of exercise on most days of the week. Walking is a good choice. You also may want to do other activities, such as running, swimming, cycling, or playing tennis or team sports. · Take a fiber supplement, such as Citrucel or Metamucil, every day. Read and follow all instructions on the label. · Schedule time each day for a bowel movement. A daily routine may help. Take your time having your bowel movement. · Support your feet with a small step stool when you sit on the toilet. This helps flex your hips and places your pelvis in a squatting position. · Your doctor may recommend an over-the-counter laxative to relieve your constipation. Examples are Milk of Magnesia and MiraLax. Read and follow all instructions on the label. Do not use laxatives on a long-term basis. When should you call for help?    Call your doctor now or seek immediate medical care if:    · You have new or worse belly pain.     · You have new or worse nausea or vomiting.     · You have blood in your stools. Watch closely for changes in your health, and be sure to contact your doctor if:    · Your constipation is getting worse.     · You do not get better as expected. Where can you learn more? Go to http://www.carrera.com/  Enter P343 in the search box to learn more about \"Constipation: Care Instructions. \"  Current as of: February 26, 2020               Content Version: 12.8  © 2006-2021 [employer]. Care instructions adapted under license by [employer] (which disclaims liability or warranty for this information). If you have questions about a medical condition or this instruction, always ask your healthcare professional. [address] any warranty or liability for your use of this information. Follow-up and Dispositions    · Return in about 3 months (around 11/26/2021) for routine follow up.

## 2021-08-26 NOTE — PATIENT INSTRUCTIONS
Constipation: Care Instructions  Your Care Instructions     Constipation means that you have a hard time passing stools (bowel movements). People pass stools from 3 times a day to once every 3 days. What is normal for you may be different. Constipation may occur with pain in the rectum and cramping. The pain may get worse when you try to pass stools. Sometimes there are small amounts of bright red blood on toilet paper or the surface of stools. This is because of enlarged veins near the rectum (hemorrhoids). A few changes in your diet and lifestyle may help you avoid ongoing constipation. Your doctor may also prescribe medicine to help loosen your stool. Some medicines can cause constipation. These include pain medicines and antidepressants. Tell your doctor about all the medicines you take. Your doctor may want to make a medicine change to ease your symptoms. Follow-up care is a key part of your treatment and safety. Be sure to make and go to all appointments, and call your doctor if you are having problems. It's also a good idea to know your test results and keep a list of the medicines you take. How can you care for yourself at home? · Drink plenty of fluids. If you have kidney, heart, or liver disease and have to limit fluids, talk with your doctor before you increase the amount of fluids you drink. · Include high-fiber foods in your diet each day. These include fruits, vegetables, beans, and whole grains. · Get at least 30 minutes of exercise on most days of the week. Walking is a good choice. You also may want to do other activities, such as running, swimming, cycling, or playing tennis or team sports. · Take a fiber supplement, such as Citrucel or Metamucil, every day. Read and follow all instructions on the label. · Schedule time each day for a bowel movement. A daily routine may help. Take your time having your bowel movement.   · Support your feet with a small step stool when you sit on the toilet. This helps flex your hips and places your pelvis in a squatting position. · Your doctor may recommend an over-the-counter laxative to relieve your constipation. Examples are Milk of Magnesia and MiraLax. Read and follow all instructions on the label. Do not use laxatives on a long-term basis. When should you call for help? Call your doctor now or seek immediate medical care if:    · You have new or worse belly pain.     · You have new or worse nausea or vomiting.     · You have blood in your stools. Watch closely for changes in your health, and be sure to contact your doctor if:    · Your constipation is getting worse.     · You do not get better as expected. Where can you learn more? Go to http://www.carrera.com/  Enter P343 in the search box to learn more about \"Constipation: Care Instructions. \"  Current as of: February 26, 2020               Content Version: 12.8  © 0009-3822 [employer]. Care instructions adapted under license by [employer] (which disclaims liability or warranty for this information). If you have questions about a medical condition or this instruction, always ask your healthcare professional. [place] [address] any warranty or liability for your use of this information.

## 2021-08-31 LAB
ALPRAZ UR QL: NEGATIVE
AMPHETAMINES UR QL SCN: NEGATIVE NG/ML
BARBITURATES UR QL SCN: NEGATIVE NG/ML
BENZODIAZ UR QL: NEGATIVE NG/ML
BZE UR QL SCN: NEGATIVE NG/ML
CANNABINOIDS UR QL SCN: NEGATIVE NG/ML
CLONAZEPAM UR QL: NEGATIVE
CREAT UR-MCNC: 208.2 MG/DL (ref 20–300)
FLURAZEPAM UR QL: NEGATIVE
LORAZEPAM UR QL: NEGATIVE
METHADONE UR QL SCN: NEGATIVE NG/ML
MIDAZOLAM UR QL CFM: NEGATIVE
NORDIAZEPAM UR QL: NEGATIVE
OPIATES UR QL SCN: NEGATIVE NG/ML
OXAZEPAM UR QL: NEGATIVE
OXYCODONE+OXYMORPHONE UR QL SCN: NEGATIVE NG/ML
PCP UR QL: NEGATIVE NG/ML
PH UR: 5.2 [PH] (ref 4.5–8.9)
PLEASE NOTE:, 733157: NORMAL
PROPOXYPH UR QL SCN: NEGATIVE NG/ML
SP GR UR: 1.02
TEMAZEPAM UR QL CFM: NEGATIVE
TRIAZOLAM UR QL: NEGATIVE

## 2021-12-28 ENCOUNTER — NURSE TRIAGE (OUTPATIENT)
Dept: OTHER | Facility: CLINIC | Age: 66
End: 2021-12-28

## 2021-12-28 NOTE — TELEPHONE ENCOUNTER
Received call from Georgiana June at [facility] with Red Flag Complaint. Subjective: Caller states \"I am her son-in-law. When she was in [place] a month a go to see her son, she became ill and went to hospital.  Was diagnosed with kidney stones (multiple) and \"clogged arteries. They wanted to do surgery but we had her come home so we could figure it out here\"     Current Symptoms: Severe right sided flank pain, vomiting    Onset: 1 month ago; worsening    Associated Symptoms: NA    Pain Severity: 10/10; sharp and stabbing; constant    Temperature: didn't check    What has been tried: took medication given to her in [place] - helped at first but now she is much worse    LMP: NA Pregnant: NA    Recommended disposition: ED now    Care advice provided, patient verbalizes understanding; denies any other questions or concerns; instructed to call back for any new or worsening symptoms. Patient proceeding to nearest Emergency Department    Attention Provider: Thank you for allowing me to participate in the care of your patient. The patient was connected to triage in response to information provided to the [facility]. Please do not respond through this encounter as the response is not directed to a shared pool. Reason for Disposition   SEVERE pain (e.g., excruciating, scale 8-10) and present > 1 hour    Protocols used:  FLANK PAIN-ADULT-OH

## 2022-01-17 ENCOUNTER — OFFICE VISIT (OUTPATIENT)
Dept: FAMILY MEDICINE CLINIC | Age: 67
End: 2022-01-17
Payer: MEDICAID

## 2022-01-17 VITALS
WEIGHT: 195 LBS | SYSTOLIC BLOOD PRESSURE: 121 MMHG | HEIGHT: 64 IN | BODY MASS INDEX: 33.29 KG/M2 | DIASTOLIC BLOOD PRESSURE: 66 MMHG | RESPIRATION RATE: 20 BRPM | TEMPERATURE: 97.9 F | OXYGEN SATURATION: 98 % | HEART RATE: 71 BPM

## 2022-01-17 DIAGNOSIS — Z13.29 SCREENING FOR THYROID DISORDER: ICD-10-CM

## 2022-01-17 DIAGNOSIS — K81.0 ACUTE CHOLECYSTITIS: Primary | ICD-10-CM

## 2022-01-17 DIAGNOSIS — E55.9 VITAMIN D DEFICIENCY: ICD-10-CM

## 2022-01-17 DIAGNOSIS — E78.2 MIXED HYPERCHOLESTEROLEMIA AND HYPERTRIGLYCERIDEMIA: ICD-10-CM

## 2022-01-17 DIAGNOSIS — E78.5 DYSLIPIDEMIA (HIGH LDL; LOW HDL): ICD-10-CM

## 2022-01-17 DIAGNOSIS — F32.A DEPRESSION, UNSPECIFIED DEPRESSION TYPE: ICD-10-CM

## 2022-01-17 DIAGNOSIS — K21.00 GASTROESOPHAGEAL REFLUX DISEASE WITH ESOPHAGITIS WITHOUT HEMORRHAGE: ICD-10-CM

## 2022-01-17 DIAGNOSIS — F33.9 MAJOR DEPRESSIVE DISORDER, RECURRENT EPISODE WITH ANXIOUS DISTRESS (HCC): ICD-10-CM

## 2022-01-17 DIAGNOSIS — I10 HYPERTENSION, WELL CONTROLLED: ICD-10-CM

## 2022-01-17 LAB
GLUCOSE POC: 315 MG/DL
HBA1C MFR BLD HPLC: 10.4 %

## 2022-01-17 PROCEDURE — 83036 HEMOGLOBIN GLYCOSYLATED A1C: CPT | Performed by: INTERNAL MEDICINE

## 2022-01-17 PROCEDURE — 82962 GLUCOSE BLOOD TEST: CPT | Performed by: INTERNAL MEDICINE

## 2022-01-17 PROCEDURE — 99214 OFFICE O/P EST MOD 30 MIN: CPT | Performed by: INTERNAL MEDICINE

## 2022-01-17 RX ORDER — PHENOL/SODIUM PHENOLATE
20 AEROSOL, SPRAY (ML) MUCOUS MEMBRANE
Qty: 90 TABLET | Refills: 1 | Status: SHIPPED | OUTPATIENT
Start: 2022-01-17 | End: 2022-03-07 | Stop reason: SDUPTHER

## 2022-01-17 RX ORDER — INSULIN ASPART 100 [IU]/ML
INJECTION, SUSPENSION SUBCUTANEOUS
Qty: 10 PEN | Refills: 6 | Status: SHIPPED | OUTPATIENT
Start: 2022-01-17 | End: 2022-03-15

## 2022-01-17 RX ORDER — METFORMIN HYDROCHLORIDE 1000 MG/1
TABLET ORAL
Qty: 60 TABLET | Refills: 3 | Status: SHIPPED | OUTPATIENT
Start: 2022-01-17 | End: 2022-03-07 | Stop reason: SDUPTHER

## 2022-01-17 RX ORDER — PRAVASTATIN SODIUM 20 MG/1
TABLET ORAL
Qty: 30 TABLET | Refills: 4 | Status: SHIPPED | OUTPATIENT
Start: 2022-01-17 | End: 2022-03-22 | Stop reason: SDUPTHER

## 2022-01-17 NOTE — PATIENT INSTRUCTIONS
Learning About Acute Cholecystitis  What is cholecystitis? Cholecystitis (say \"koh-lih-sis-TY-tus\") is inflammation of the gallbladder. The gallbladder stores bile. Bile helps the body digest food. Normally, the bile flows from the gallbladder to the small intestine. A gallstone stuck in the cystic duct is most often the cause of sudden (acute) cholecystitis. The cystic duct is the tube that carries the bile out of the gallbladder. The gallstone blocks the bile from leaving the gallbladder. This results in an irritated and swollen gallbladder. The disease can also be caused by infection or trauma, such as an injury from a car accident. Cholecystitis has to be treated right away. You will probably have to go to the hospital. Surgery is the usual treatment. What are the symptoms? Symptoms include:  · Steady and severe pain in the upper right part of belly. This is the most common symptom. The pain can sometimes move to your back or right shoulder blade. It may last for more than 6 hours. · Nausea or vomiting. · A fever. How is it treated? The main way to treat this disease is surgery to remove the gallbladder. This surgery can often be done through small cuts (incisions) in the belly. This is called a laparoscopic cholecystectomy. In some cases, you may need a more extensive surgery. You may need surgery as soon as possible. The doctor may try to reduce swelling and irritation in the gallbladder before removing it. You may be given fluids and antibiotics through an IV. You may also be given pain medicine. Follow-up care is a key part of your treatment and safety. Be sure to make and go to all appointments, and call your doctor if you are having problems. It's also a good idea to know your test results and keep a list of the medicines you take. Where can you learn more?   Go to http://www.gray.com/  Enter T940 in the search box to learn more about \"Learning About Acute Cholecystitis. \"  Current as of: February 10, 2021               Content Version: 13.0  © 8155-9745 Healthwise, Incorporated. Care instructions adapted under license by [employer] (which disclaims liability or warranty for this information). If you have questions about a medical condition or this instruction, always ask your healthcare professional. [place] [address] any warranty or liability for your use of this information.

## 2022-01-17 NOTE — PROGRESS NOTES
Chief Complaint   Patient presents with   [facility] Follow Up     [address]     HPI:  Emma Singer is a 77 y.o. female with h/o poorly controlled diabetes, hypertension, hypercholesterolemia presents accompanied for hospital follow up. Patient was [facility] to [facility] for diagnosis of acute cholecystitis and treated with antibiotics. She was referred a general surgeon who does not take her current insurance. Mean purpose for the visit is to fine a provider that takes her insurance for management cholecystitis. I have secure an appointment with Dr. Narinder Chapa on 22 at [facility]. Review of Systems  As per hpi    Past Medical History:   Diagnosis Date    Arthritis     shoulders and knees.     Diabetes ([address]     insulin dependent     Diabetes ([address]     Hypercholesterolemia     Hypertension      Past Surgical History:   Procedure Laterality Date    COLONOSCOPY N/A 2019    COLONOS[facility] performed by [facility]ay Redmond MD at [facility] ENDOSCOPY    HX COLONOSCOPY      HX GYN      hysterectomy    HX GYN      hysterectomy, total for fibroid    [facility] ABDOMEN SURGERY PROC UNLISTED           Social History     Socioeconomic History    Marital status:    Tobacco Use    Smoking status: Current Every Day Smoker     Packs/day: 2.00     Years: 41.00     Pack years: 82.00     Types: Cigarettes    Smokeless tobacco: Never Used   Vaping Use    Vaping Use: Never used   Substance and Sexual Activity    Alcohol use: No    Drug use: No    Sexual activity: Never   Social History Narrative    ** Merged History Encounter **          Family History   Problem Relation Age of Onset    Diabetes Mother     Diabetes Father     Diabetes Sister     Diabetes Brother      Current Outpatient Medications   Medication Sig Dispense Refill    albuterol (PROVENTIL HFA, VENTOLIN HFA, PROAIR HFA) 90 mcg/actuation inhaler Take 1 Puff by inhalation every six (6) hours as needed for Wheezing. 1 Inhaler 3    fluticasone propion-salmeteroL (Advair HFA) 230-21 mcg/actuation inhaler Take 2 Puffs by inhalation two (2) times a day. 1 Each 2    buPROPion SR (WELLBUTRIN SR) 150 mg SR tablet TAKE 1 TAB DAILY X 1 WEEK THEN INCREASE TO 1 TABLET TWICE DAILY- FOR DEPRESSION & SMOKING 60 Tablet 5    cholecalciferol (VITAMIN D3) (5000 Units /125 mcg) capsule Take 1 Capsule by mouth daily. 30 Capsule 3    gabapentin (NEURONTIN) 100 mg capsule Take 2 Capsules by mouth three (3) times daily. 90 Capsule 0    insulin aspart protamine/insulin aspart (NovoLOG Mix 70-30FlexPen U-100) 100 unit/mL (70-30) inpn 40 units at noon (first meal) and 40 units at 7 pm (second meal) 10 Pen 6    lisinopriL (PRINIVIL, ZESTRIL) 5 mg tablet Take 1 Tablet by mouth daily. 90 Tablet 1    meloxicam (MOBIC) 15 mg tablet Take 1 Tablet by mouth daily. 20 Tablet 1    metFORMIN (GLUCOPHAGE) 1,000 mg tablet TAKE 1 TABLET BY MOUTH TWICE A DAY WITH MEALS 60 Tablet 3    Omeprazole delayed release (PRILOSEC D/R) 20 mg tablet Take 1 Tablet by mouth daily as needed (acid reflux). 90 Tablet 1    pravastatin (PRAVACHOL) 20 mg tablet TAKE 1 TABLET BY MOUTH EVERY DAY AT NIGHT 30 Tablet 4    clotrimazole (LOTRIMIN) 1 % topical cream Apply  to affected area two (2) times a day. 60 g 1    polyethylene glycol (MIRALAX) 17 gram/dose powder Take 17 g by mouth daily. 30 g 3    insulin aspart protamine/insulin aspart (NovoLOG Mix 70-30FlexPen U-100) 100 unit/mL (70-30) inpn 40 units at noon (first meal) and 40 units at 7 pm (second meal) 10 Pen 6    Blood-Glucose Meter (FreeStyle System Kit) monitoring kit Monitor blood sugar 3 times daily 1 Kit 0    glucose blood VI test strips (FreeStyle Test) strip Monitor blood sugar 3 times daily. E11.65, Z79.4 200 Strip 10    glucose blood VI test strips (Freestyle [employer]) strip Check sugar 3 times a day 300 Strip 4    FreeStyle Lancets 28 gauge misc TESTING 3 TIMES A DAY.  E11.65 100 Lancet 2    methocarbamoL (ROBAXIN) 750 mg tablet Take 1 Tab by mouth four (4) times daily. 30 Tab 1    acetaminophen (TYLENOL) 325 mg tablet TAKE 3 TABLETS BY MOUTH EVERY 8 HOURS      lidocaine (LIDODERM) 5 % APPLYT 1 PATCH TOPICALLY EVERY DAY      Insulin Needles, Disposable, (BD ULTRA-FINE MINI PEN NEEDLE) 31 gauge x 3/16\" ndle Use to inject insulin twice daily 200 Pen Needle 3    lancets 32 gauge misc 1 Each by Does Not Apply route three (3) times daily. 300 Lancet 3    Blood-Glucose Meter (FREESTYLE FLASH SYSTEM) monitoring kit Test 3 Times Daily   DX E11.40,  E11.65 1 Kit 0     No Known Allergies    Objective:  Visit Vitals  /66   Pulse 71   Temp 97.9 °F (36.6 °C) (Temporal)   Resp 20   Ht 5' 4\" (1.626 m)   Wt 195 lb (88.5 kg)   LMP  (LMP Unknown)   SpO2 98%   BMI 33.47 kg/m²     Physical Exam:   General appearance - alert, well appearing in no distress  Mental status - alert, oriented to person, place, and time  Neck - supple, no significant adenopathy   Chest - clear to auscultation, no wheezes, rales or rhonchi  Heart - normal rate, regular rhythm, no murmurs  Abdomen - soft, tender, nondistended, no guarding   Ext-peripheral pulses normal, no pedal edema  Neuro -no focal findings    Results for orders placed or performed in visit on 08/26/21   10-DRUG SCREEN, URINE W RFLX CONFIRMATION   Result Value Ref Range    Amphetamine Screen, urine Negative Yhneew=4674 ng/mL    Barbiturates Screen, urine Negative Mevceh=557 ng/mL    Benzodiazepines Negative Pdcuir=962 ng/mL    Nordiazepam Negative Hziswy=593    Oxazepam Negative Embluo=435    Flurazepam Negative Wcqgir=945    Lorazepam Negative Lmbncb=129    Alprazolam Negative Drkjmf=876    Clonazepam Negative Qptrvr=091    Temazepam Negative Nftfou=244    Triazolam Negative Geejek=521    Midazolam Negative Qdmdfd=353    Please Note Comment     Cannabinoid Screen, urine Negative Cutoff=20 ng/mL    Cocaine Metab.  Screen, urine Negative Lqcdyb=515 ng/mL    Opiate Screen, urine Negative Uckltz=912 ng/mL    Oxycodone/Oxymorphone, urine Negative Jzdebz=441 ng/mL    Phencyclidine Screen, urine Negative Cutoff=25 ng/mL    Methadone Screen, urine Negative Iuwcls=141 ng/mL    Propoxyphene Screen, urine Negative Zzgzrb=543 ng/mL    Creatinine, urine 208.2 20.0 - 300.0 mg/dL    Specific Gravity 1.022     pH, urine 5.2 4.5 - 8.9   AMB POC HEMOGLOBIN A1C   Result Value Ref Range    Hemoglobin A1c (POC) 9.8 %   AMB POC GLUCOSE BLOOD, BY GLUCOSE MONITORING DEVICE   Result Value Ref Range    Glucose  MG/DL   AMB POC URINALYSIS DIP STICK AUTO W/ MICRO   Result Value Ref Range    Color (UA POC) Gretchen     Clarity (UA POC) Clear     Glucose (UA POC) Trace Negative    Bilirubin (UA POC) 1+ Negative    Ketones (UA POC) Negative Negative    Specific gravity (UA POC) 1.030 1.001 - 1.035    Blood (UA POC) Trace Negative    pH (UA POC) 5.5 4.6 - 8.0    Protein (UA POC) Trace Negative    Urobilinogen (UA POC) 1 mg/dL 0.2 - 1    Nitrites (UA POC) Negative Negative    Leukocyte esterase (UA POC) Negative Negative     Assessment/Plan:  Diagnoses and all orders for this visit:    Acute cholecystitis  -     METABOLIC PANEL, COMPREHENSIVE; Future  -     CBC WITH AUTOMATED DIFF; Future  -     REFERRAL TO GENERAL SURGERY    DM type 2, uncontrolled, with neuropathy (HCC)  -     AMB POC HEMOGLOBIN A1C  -     AMB POC GLUCOSE BLOOD, BY GLUCOSE MONITORING DEVICE  -     METABOLIC PANEL, COMPREHENSIVE; Future  -     CBC WITH AUTOMATED DIFF;  Future  -     MICROALBUMIN, UR, RAND W/ MICROALB/CREAT RATIO; Future  -     metFORMIN (GLUCOPHAGE) 1,000 mg tablet; TAKE 1 TABLET BY MOUTH TWICE A DAY WITH MEALS, Normal, Disp-60 Tablet, R-3  -     insulin aspart protamine/insulin aspart (NovoLOG Mix 70-30FlexPen U-100) 100 unit/mL (70-30) inpn; 40 units at noon (first meal) and 40 units at 7 pm (second meal), Normal, Disp-10 Pen, R-6    Major depressive disorder, recurrent episode with anxious distress (HCC)  - METABOLIC PANEL, COMPREHENSIVE; Future  -     CBC WITH AUTOMATED DIFF; Future    Depression, unspecified depression type  -     METABOLIC PANEL, COMPREHENSIVE; Future  -     CBC WITH AUTOMATED DIFF; Future    Hypertension, well controlled  -     METABOLIC PANEL, COMPREHENSIVE; Future  -     CBC WITH AUTOMATED DIFF; Future    Vitamin D deficiency  -     VITAMIN D, 25 HYDROXY; Future    Dyslipidemia (high LDL; low HDL)  -     LIPID PANEL; Future    Screening for thyroid disorder  -     TSH 3RD GENERATION; Future    Mixed hypercholesterolemia and hypertriglyceridemia  -     pravastatin (PRAVACHOL) 20 mg tablet; TAKE 1 TABLET BY MOUTH EVERY DAY AT NIGHT, Normal, Disp-30 Tablet, R-4    Gastroesophageal reflux disease with esophagitis without hemorrhage  -     Omeprazole delayed release (PRILOSEC D/R) 20 mg tablet; Take 1 Tablet by mouth daily as needed (acid reflux). , Normal, Disp-90 Tablet, R-1      Patient Instructions        Learning About Acute Cholecystitis  What is cholecystitis? Cholecystitis (say \"koh-lih-sis-TY-tus\") is inflammation of the gallbladder. The gallbladder stores bile. Bile helps the body digest food. Normally, the bile flows from the gallbladder to the small intestine. A gallstone stuck in the cystic duct is most often the cause of sudden (acute) cholecystitis. The cystic duct is the tube that carries the bile out of the gallbladder. The gallstone blocks the bile from leaving the gallbladder. This results in an irritated and swollen gallbladder. The disease can also be caused by infection or trauma, such as an injury from a car accident. Cholecystitis has to be treated right away. You will probably have to go to the hospital. Surgery is the usual treatment. What are the symptoms? Symptoms include:  · Steady and severe pain in the upper right part of belly. This is the most common symptom. The pain can sometimes move to your back or right shoulder blade.  It may last for more than 6 hours.  · Nausea or vomiting. · A fever. How is it treated? The main way to treat this disease is surgery to remove the gallbladder. This surgery can often be done through small cuts (incisions) in the belly. This is called a laparoscopic cholecystectomy. In some cases, you may need a more extensive surgery. You may need surgery as soon as possible. The doctor may try to reduce swelling and irritation in the gallbladder before removing it. You may be given fluids and antibiotics through an IV. You may also be given pain medicine. Follow-up care is a key part of your treatment and safety. Be sure to make and go to all appointments, and call your doctor if you are having problems. It's also a good idea to know your test results and keep a list of the medicines you take. Where can you learn more? Go to http://www.gray.com/  Enter T940 in the search box to learn more about \"Learning About Acute Cholecystitis. \"  Current as of: February 10, 2021               Content Version: 13.0  © 2006-2021 Healthwise, [facility]. Care instructions adapted under license by [employer] (which disclaims liability or warranty for this information). If you have questions about a medical condition or this instruction, always ask your healthcare professional. [place] [address] any warranty or liability for your use of this information. Follow-up and Dispositions    · Return in about 4 weeks (around 2/14/2022), or if symptoms worsen or fail to improve, for f/u results.

## 2022-01-18 ENCOUNTER — OFFICE VISIT (OUTPATIENT)
Dept: SURGERY | Age: 67
End: 2022-01-18
Payer: MEDICAID

## 2022-01-18 VITALS
TEMPERATURE: 98 F | DIASTOLIC BLOOD PRESSURE: 87 MMHG | HEART RATE: 84 BPM | SYSTOLIC BLOOD PRESSURE: 136 MMHG | OXYGEN SATURATION: 97 % | WEIGHT: 196 LBS | HEIGHT: 64 IN | RESPIRATION RATE: 18 BRPM | BODY MASS INDEX: 33.46 KG/M2

## 2022-01-18 DIAGNOSIS — K81.9 CHOLECYSTITIS: Primary | ICD-10-CM

## 2022-01-18 PROCEDURE — 99212 OFFICE O/P EST SF 10 MIN: CPT | Performed by: SURGERY

## 2022-01-18 NOTE — PROGRESS NOTES
Belkys Young is a 77 y.o. female who is referred by Dr. Smitha Hernandez for further evaluation of acute cholecystitis. Information obtained from patient via daughter via blue phone  and review of outside records. Ms. Flex Ashford tells me that she has been experiencing intermittent RUQ and epigastric abdominal pain for some time now. The episodes of pain are becoming more frequent and more severe. Subjectively febrile. Associated abdominal bloating, nausea and emesis. Ms. Flex Ashford reports diarrhea but denies marcia colored stool. However, she does give a vague h/o tea colored urine. Urinary frequency but no dysuria. No shortness of breath or chest pain. Recently seen in ER where she was found to have cholelithiasis and possibly acute cholecystitis on CT scan of the abdomen/pelvis. She has otherwise been in her usual state of health. CT scan abdomen/pelvis without po/IV contrast - 2021 - Gallbladder is mildly distended with at least one stone, mild wall thickening and mild stranding of the adjacent fat. The findings are most suggestive of acute cholecystitis. (By report. Films not available.)     Past Medical History:   Diagnosis Date    Arthritis     shoulders and knees.     Cholecystitis 2022    Diabetes ([address]     insulin dependent     Diabetes ([address]     Hypercholesterolemia     Hypertension      Past Surgical History:   Procedure Laterality Date    COLONOSCOPY N/A 2019    COLONOSCOPY performed by Milagros Narvaez MD at [address] [place] [address] ENDOSCOPY    HX COLONOSCOPY      HX GYN      hysterectomy    HX GYN      hysterectomy, total for fibroid    [place] ABDOMEN SURGERY PROC UNLISTED           Family History   Problem Relation Age of Onset    Diabetes Mother     Diabetes Father     Diabetes Sister     Diabetes Brother      Social History     Socioeconomic History    Marital status:    Tobacco Use    Smoking status: Current Every Day Smoker     Packs/day: 2.00     Years: 41.00 Pack years: 82.00     Types: Cigarettes    Smokeless tobacco: Never Used   Vaping Use    Vaping Use: Never used   Substance and Sexual Activity    Alcohol use: No    Drug use: No    Sexual activity: Never   Social History Narrative    ** Merged History Encounter **          Review of systems negative except as noted. Review of Systems   Constitutional: Positive for fever (Subjective). Negative for chills. Respiratory: Negative for shortness of breath. Cardiovascular: Negative for chest pain. Gastrointestinal: Positive for abdominal pain, diarrhea, nausea and vomiting. Abdominal bloating. No clear h/o macria colored stool. Genitourinary: Positive for dysuria and frequency. Negative for hematuria. Vague h/o tea colored urine. Physical Exam  Vitals reviewed. Constitutional:       General: She is not in acute distress. Appearance: Normal appearance. HENT:      Head: Normocephalic and atraumatic. Cardiovascular:      Rate and Rhythm: Normal rate and regular rhythm. Pulmonary:      Effort: Pulmonary effort is normal.      Breath sounds: Normal breath sounds. Abdominal:      General: There is no distension. Palpations: Abdomen is soft. Tenderness: There is abdominal tenderness (RUQ.). There is no guarding or rebound. Musculoskeletal:         General: Normal range of motion. Neurological:      General: No focal deficit present. Mental Status: She is alert. ASSESSMENT and PLAN  Ms. Medardo Geiger is a 77 y.o. female with cholecystitis. In view of the findings on History and Physical examination as well as the imaging studies, she should benefit from cholecystectomy. I discussed laparoscopic cholecystectomy and intra-operative cholangiogram with her via her daughter via the blue phone  today including the potential risks of bleeding, infection, injury to the common bile duct and conversion to an open procedure. She understands and wishes to proceed.  I have tentatively scheduled Ms. Dia for laparoscopic cholecystectomy and intra-operative cholangiogram on February 4, 2022 at [address]. I will keep her overnight for observation and see her back in the office post-operatively. Discussed plan with Ms. Dia via her daughter via the blue phone  and she is agreeable.        CC: Jeremie Day MD

## 2022-01-19 RX ORDER — BUPIVACAINE HYDROCHLORIDE 2.5 MG/ML
30 INJECTION, SOLUTION EPIDURAL; INFILTRATION; INTRACAUDAL ONCE
Status: CANCELLED | OUTPATIENT
Start: 2022-01-19 | End: 2022-01-19

## 2022-01-19 RX ORDER — ACETAMINOPHEN 325 MG/1
1000 TABLET ORAL ONCE
Status: CANCELLED | OUTPATIENT
Start: 2022-01-19 | End: 2022-01-20

## 2022-01-27 ENCOUNTER — TRANSCRIBE ORDER (OUTPATIENT)
Dept: REGISTRATION | Age: 67
End: 2022-01-27

## 2022-01-27 DIAGNOSIS — Z01.812 PRE-PROCEDURE LAB EXAM: Primary | ICD-10-CM

## 2022-02-01 ENCOUNTER — HOSPITAL ENCOUNTER (OUTPATIENT)
Dept: PREADMISSION TESTING | Age: 67
Discharge: HOME | End: 2022-02-01
Attending: SURGERY
Payer: MEDICAID

## 2022-02-01 DIAGNOSIS — Z01.812 PRE-PROCEDURE LAB EXAM: ICD-10-CM

## 2022-02-01 PROCEDURE — U0005 INFEC AGEN DETEC AMPLI PROBE: HCPCS

## 2022-02-02 LAB
SARS-COV-2, XPLCVT: NOT DETECTED
SOURCE, COVRS: NORMAL

## 2022-02-03 ENCOUNTER — ANESTHESIA EVENT (OUTPATIENT)
Dept: SURGERY | Age: 67
End: 2022-02-03
Payer: MEDICAID

## 2022-02-04 ENCOUNTER — ANESTHESIA (OUTPATIENT)
Dept: SURGERY | Age: 67
End: 2022-02-04
Payer: MEDICAID

## 2022-02-04 ENCOUNTER — APPOINTMENT (OUTPATIENT)
Dept: GENERAL RADIOLOGY | Age: 67
End: 2022-02-04
Attending: SURGERY
Payer: MEDICAID

## 2022-02-04 ENCOUNTER — HOSPITAL ENCOUNTER (OUTPATIENT)
Age: 67
Setting detail: OUTPATIENT SURGERY
Discharge: HOME | End: 2022-02-04
Attending: SURGERY | Admitting: SURGERY
Payer: MEDICAID

## 2022-02-04 VITALS
OXYGEN SATURATION: 95 % | WEIGHT: 201.06 LBS | DIASTOLIC BLOOD PRESSURE: 67 MMHG | SYSTOLIC BLOOD PRESSURE: 127 MMHG | HEIGHT: 64 IN | HEART RATE: 94 BPM | TEMPERATURE: 97.9 F | RESPIRATION RATE: 10 BRPM | BODY MASS INDEX: 34.33 KG/M2

## 2022-02-04 DIAGNOSIS — K81.9 CHOLECYSTITIS: Primary | ICD-10-CM

## 2022-02-04 PROCEDURE — 74011000250 HC RX REV CODE- 250: Performed by: ANESTHESIOLOGY

## 2022-02-04 PROCEDURE — 76210000016 HC OR PH I REC 1 TO 1.5 HR: Performed by: SURGERY

## 2022-02-04 PROCEDURE — 74011250636 HC RX REV CODE- 250/636: Performed by: ANESTHESIOLOGY

## 2022-02-04 PROCEDURE — 88304 TISSUE EXAM BY PATHOLOGIST: CPT

## 2022-02-04 PROCEDURE — 77030008608 HC TRCR ENDOSC SMTH AMR -B: Performed by: SURGERY

## 2022-02-04 PROCEDURE — 77030007955 HC PCH ENDOSC SPEC J&J -B: Performed by: SURGERY

## 2022-02-04 PROCEDURE — 77030008756 HC TU IRR SUC STRY -B: Performed by: SURGERY

## 2022-02-04 PROCEDURE — 74011000636 HC RX REV CODE- 636: Performed by: SURGERY

## 2022-02-04 PROCEDURE — 76010000132 HC OR TIME 2.5 TO 3 HR: Performed by: SURGERY

## 2022-02-04 PROCEDURE — 77030038093 HC CATH CHOLGM OP PMI PRGV-C: Performed by: SURGERY

## 2022-02-04 PROCEDURE — 74011000250 HC RX REV CODE- 250: Performed by: SURGERY

## 2022-02-04 PROCEDURE — 77030020704 HC DISECT ENDOSC BLNT J&J -B: Performed by: SURGERY

## 2022-02-04 PROCEDURE — 77030012770 HC TRCR OPT FX AMR -B: Performed by: SURGERY

## 2022-02-04 PROCEDURE — 77030026438 HC STYL ET INTUB CARD -A: Performed by: ANESTHESIOLOGY

## 2022-02-04 PROCEDURE — 77030031139 HC SUT VCRL2 J&J -A: Performed by: SURGERY

## 2022-02-04 PROCEDURE — 77030020053 HC ELECTRD LAPSCP COVD -B: Performed by: SURGERY

## 2022-02-04 PROCEDURE — 74011250637 HC RX REV CODE- 250/637: Performed by: SURGERY

## 2022-02-04 PROCEDURE — 77030005244 HC CATH INSRT PRT RANF -B: Performed by: SURGERY

## 2022-02-04 PROCEDURE — 77030018875 HC APPL CLP LIG4 J&J -B: Performed by: SURGERY

## 2022-02-04 PROCEDURE — 76060000036 HC ANESTHESIA 2.5 TO 3 HR: Performed by: SURGERY

## 2022-02-04 PROCEDURE — 74300 X-RAY BILE DUCTS/PANCREAS: CPT

## 2022-02-04 PROCEDURE — 77030038079 HC RELD STPLR ENDOSC J&J -G: Performed by: SURGERY

## 2022-02-04 PROCEDURE — 47563 LAPARO CHOLECYSTECTOMY/GRAPH: CPT | Performed by: SURGERY

## 2022-02-04 PROCEDURE — 77030037032 HC INSRT SCIS CLICKLLINE DISP STOR -B: Performed by: SURGERY

## 2022-02-04 PROCEDURE — 77030002933 HC SUT MCRYL J&J -A: Performed by: SURGERY

## 2022-02-04 PROCEDURE — 77030027743 HC APPL F/HEMSTAT BARD -B: Performed by: SURGERY

## 2022-02-04 PROCEDURE — 77030010507 HC ADH SKN DERMBND J&J -B: Performed by: SURGERY

## 2022-02-04 PROCEDURE — 77030027876 HC STPLR ENDOSC FLX PWR J&J -G1: Performed by: SURGERY

## 2022-02-04 PROCEDURE — 2709999900 HC NON-CHARGEABLE SUPPLY: Performed by: SURGERY

## 2022-02-04 PROCEDURE — 77030040361 HC SLV COMPR DVT MDII -B: Performed by: SURGERY

## 2022-02-04 PROCEDURE — 74011250636 HC RX REV CODE- 250/636

## 2022-02-04 PROCEDURE — 77030008684 HC TU ET CUF COVD -B: Performed by: ANESTHESIOLOGY

## 2022-02-04 PROCEDURE — 76210000021 HC REC RM PH II 0.5 TO 1 HR: Performed by: SURGERY

## 2022-02-04 PROCEDURE — 77030032060 HC PWDR HEMSTAT ARISTA ASRB 3GM BARD -C: Performed by: SURGERY

## 2022-02-04 PROCEDURE — 77030039895 HC SYST SMK EVAC LAP COVD -B: Performed by: SURGERY

## 2022-02-04 PROCEDURE — 74011250636 HC RX REV CODE- 250/636: Performed by: SURGERY

## 2022-02-04 PROCEDURE — 77030034154 HC SHR COAG HARM ACE J&J -F: Performed by: SURGERY

## 2022-02-04 PROCEDURE — 77030013079 HC BLNKT BAIR HGGR 3M -A: Performed by: ANESTHESIOLOGY

## 2022-02-04 RX ORDER — ONDANSETRON 2 MG/ML
INJECTION INTRAMUSCULAR; INTRAVENOUS
Status: COMPLETED
Start: 2022-02-04 | End: 2022-02-04

## 2022-02-04 RX ORDER — OXYCODONE HYDROCHLORIDE 5 MG/1
TABLET ORAL
Status: DISCONTINUED
Start: 2022-02-04 | End: 2022-02-04 | Stop reason: HOSPADM

## 2022-02-04 RX ORDER — MIDAZOLAM HYDROCHLORIDE 1 MG/ML
INJECTION, SOLUTION INTRAMUSCULAR; INTRAVENOUS AS NEEDED
Status: DISCONTINUED | OUTPATIENT
Start: 2022-02-04 | End: 2022-02-04 | Stop reason: HOSPADM

## 2022-02-04 RX ORDER — METRONIDAZOLE 500 MG/100ML
500 INJECTION, SOLUTION INTRAVENOUS ONCE
Status: COMPLETED | OUTPATIENT
Start: 2022-02-04 | End: 2022-02-04

## 2022-02-04 RX ORDER — SODIUM CHLORIDE, SODIUM LACTATE, POTASSIUM CHLORIDE, CALCIUM CHLORIDE 600; 310; 30; 20 MG/100ML; MG/100ML; MG/100ML; MG/100ML
INJECTION, SOLUTION INTRAVENOUS
Status: DISCONTINUED | OUTPATIENT
Start: 2022-02-04 | End: 2022-02-04 | Stop reason: HOSPADM

## 2022-02-04 RX ORDER — FENTANYL CITRATE 50 UG/ML
INJECTION, SOLUTION INTRAMUSCULAR; INTRAVENOUS AS NEEDED
Status: DISCONTINUED | OUTPATIENT
Start: 2022-02-04 | End: 2022-02-04 | Stop reason: HOSPADM

## 2022-02-04 RX ORDER — BUPIVACAINE HYDROCHLORIDE 2.5 MG/ML
30 INJECTION, SOLUTION EPIDURAL; INFILTRATION; INTRACAUDAL ONCE
Status: DISCONTINUED | OUTPATIENT
Start: 2022-02-04 | End: 2022-02-04 | Stop reason: HOSPADM

## 2022-02-04 RX ORDER — SODIUM CHLORIDE 0.9 % (FLUSH) 0.9 %
5-40 SYRINGE (ML) INJECTION EVERY 8 HOURS
Status: DISCONTINUED | OUTPATIENT
Start: 2022-02-04 | End: 2022-02-04 | Stop reason: HOSPADM

## 2022-02-04 RX ORDER — GLYCOPYRROLATE 0.2 MG/ML
INJECTION INTRAMUSCULAR; INTRAVENOUS AS NEEDED
Status: DISCONTINUED | OUTPATIENT
Start: 2022-02-04 | End: 2022-02-04 | Stop reason: HOSPADM

## 2022-02-04 RX ORDER — PHENYLEPHRINE HCL IN 0.9% NACL 0.4MG/10ML
SYRINGE (ML) INTRAVENOUS AS NEEDED
Status: DISCONTINUED | OUTPATIENT
Start: 2022-02-04 | End: 2022-02-04 | Stop reason: HOSPADM

## 2022-02-04 RX ORDER — OXYCODONE HYDROCHLORIDE 5 MG/1
5 TABLET ORAL
Qty: 8 TABLET | Refills: 0 | Status: SHIPPED | OUTPATIENT
Start: 2022-02-04 | End: 2022-02-07

## 2022-02-04 RX ORDER — PROPOFOL 10 MG/ML
INJECTION, EMULSION INTRAVENOUS AS NEEDED
Status: DISCONTINUED | OUTPATIENT
Start: 2022-02-04 | End: 2022-02-04 | Stop reason: HOSPADM

## 2022-02-04 RX ORDER — FENTANYL CITRATE 50 UG/ML
INJECTION, SOLUTION INTRAMUSCULAR; INTRAVENOUS
Status: COMPLETED
Start: 2022-02-04 | End: 2022-02-04

## 2022-02-04 RX ORDER — HYDROMORPHONE HYDROCHLORIDE 2 MG/ML
INJECTION, SOLUTION INTRAMUSCULAR; INTRAVENOUS; SUBCUTANEOUS AS NEEDED
Status: DISCONTINUED | OUTPATIENT
Start: 2022-02-04 | End: 2022-02-04 | Stop reason: HOSPADM

## 2022-02-04 RX ORDER — ACETAMINOPHEN 500 MG
1000 TABLET ORAL ONCE
Status: DISCONTINUED | OUTPATIENT
Start: 2022-02-04 | End: 2022-02-04 | Stop reason: HOSPADM

## 2022-02-04 RX ORDER — DEXAMETHASONE SODIUM PHOSPHATE 4 MG/ML
INJECTION, SOLUTION INTRA-ARTICULAR; INTRALESIONAL; INTRAMUSCULAR; INTRAVENOUS; SOFT TISSUE AS NEEDED
Status: DISCONTINUED | OUTPATIENT
Start: 2022-02-04 | End: 2022-02-04 | Stop reason: HOSPADM

## 2022-02-04 RX ORDER — OXYCODONE HYDROCHLORIDE 5 MG/1
5 TABLET ORAL
Status: COMPLETED | OUTPATIENT
Start: 2022-02-04 | End: 2022-02-04

## 2022-02-04 RX ORDER — SUCCINYLCHOLINE CHLORIDE 20 MG/ML
INJECTION INTRAMUSCULAR; INTRAVENOUS AS NEEDED
Status: DISCONTINUED | OUTPATIENT
Start: 2022-02-04 | End: 2022-02-04 | Stop reason: HOSPADM

## 2022-02-04 RX ORDER — LIDOCAINE HYDROCHLORIDE 20 MG/ML
INJECTION, SOLUTION EPIDURAL; INFILTRATION; INTRACAUDAL; PERINEURAL AS NEEDED
Status: DISCONTINUED | OUTPATIENT
Start: 2022-02-04 | End: 2022-02-04 | Stop reason: HOSPADM

## 2022-02-04 RX ORDER — BUPIVACAINE HYDROCHLORIDE 2.5 MG/ML
INJECTION, SOLUTION EPIDURAL; INFILTRATION; INTRACAUDAL AS NEEDED
Status: DISCONTINUED | OUTPATIENT
Start: 2022-02-04 | End: 2022-02-04 | Stop reason: HOSPADM

## 2022-02-04 RX ORDER — SODIUM CHLORIDE 0.9 % (FLUSH) 0.9 %
5-40 SYRINGE (ML) INJECTION AS NEEDED
Status: DISCONTINUED | OUTPATIENT
Start: 2022-02-04 | End: 2022-02-04 | Stop reason: HOSPADM

## 2022-02-04 RX ORDER — MORPHINE SULFATE 2 MG/ML
2 INJECTION, SOLUTION INTRAMUSCULAR; INTRAVENOUS
Status: DISCONTINUED | OUTPATIENT
Start: 2022-02-04 | End: 2022-02-04 | Stop reason: HOSPADM

## 2022-02-04 RX ORDER — HYDROMORPHONE HYDROCHLORIDE 1 MG/ML
0.2 INJECTION, SOLUTION INTRAMUSCULAR; INTRAVENOUS; SUBCUTANEOUS
Status: DISCONTINUED | OUTPATIENT
Start: 2022-02-04 | End: 2022-02-04 | Stop reason: HOSPADM

## 2022-02-04 RX ORDER — SODIUM CHLORIDE 0.9 % (FLUSH) 0.9 %
5-40 SYRINGE (ML) INJECTION EVERY 8 HOURS
Status: CANCELLED | OUTPATIENT
Start: 2022-02-04

## 2022-02-04 RX ORDER — SODIUM CHLORIDE 0.9 % (FLUSH) 0.9 %
5-40 SYRINGE (ML) INJECTION AS NEEDED
Status: CANCELLED | OUTPATIENT
Start: 2022-02-04

## 2022-02-04 RX ORDER — ACETAMINOPHEN 500 MG
1000 TABLET ORAL
Qty: 20 TABLET | Refills: 2 | Status: SHIPPED | OUTPATIENT
Start: 2022-02-04

## 2022-02-04 RX ORDER — LIDOCAINE HYDROCHLORIDE 10 MG/ML
0.1 INJECTION, SOLUTION EPIDURAL; INFILTRATION; INTRACAUDAL; PERINEURAL AS NEEDED
Status: CANCELLED | OUTPATIENT
Start: 2022-02-04

## 2022-02-04 RX ORDER — SODIUM CHLORIDE, SODIUM LACTATE, POTASSIUM CHLORIDE, CALCIUM CHLORIDE 600; 310; 30; 20 MG/100ML; MG/100ML; MG/100ML; MG/100ML
50 INJECTION, SOLUTION INTRAVENOUS CONTINUOUS
Status: CANCELLED | OUTPATIENT
Start: 2022-02-04 | End: 2022-02-05

## 2022-02-04 RX ORDER — FENTANYL CITRATE 50 UG/ML
25 INJECTION, SOLUTION INTRAMUSCULAR; INTRAVENOUS
Status: COMPLETED | OUTPATIENT
Start: 2022-02-04 | End: 2022-02-04

## 2022-02-04 RX ORDER — ROCURONIUM BROMIDE 10 MG/ML
INJECTION, SOLUTION INTRAVENOUS AS NEEDED
Status: DISCONTINUED | OUTPATIENT
Start: 2022-02-04 | End: 2022-02-04 | Stop reason: HOSPADM

## 2022-02-04 RX ORDER — ONDANSETRON 2 MG/ML
INJECTION INTRAMUSCULAR; INTRAVENOUS AS NEEDED
Status: DISCONTINUED | OUTPATIENT
Start: 2022-02-04 | End: 2022-02-04 | Stop reason: HOSPADM

## 2022-02-04 RX ORDER — ONDANSETRON 2 MG/ML
4 INJECTION INTRAMUSCULAR; INTRAVENOUS AS NEEDED
Status: DISCONTINUED | OUTPATIENT
Start: 2022-02-04 | End: 2022-02-04 | Stop reason: HOSPADM

## 2022-02-04 RX ADMIN — FENTANYL CITRATE 50 MCG: 50 INJECTION, SOLUTION INTRAMUSCULAR; INTRAVENOUS at 07:40

## 2022-02-04 RX ADMIN — FENTANYL CITRATE 25 MCG: 50 INJECTION, SOLUTION INTRAMUSCULAR; INTRAVENOUS at 10:32

## 2022-02-04 RX ADMIN — PHENYLEPHRINE HYDROCHLORIDE 20 MCG/MIN: 10 INJECTION INTRAVENOUS at 07:48

## 2022-02-04 RX ADMIN — PROPOFOL 150 MG: 10 INJECTION, EMULSION INTRAVENOUS at 07:40

## 2022-02-04 RX ADMIN — PROPOFOL 50 MG: 10 INJECTION, EMULSION INTRAVENOUS at 07:49

## 2022-02-04 RX ADMIN — FENTANYL CITRATE 50 MCG: 50 INJECTION, SOLUTION INTRAMUSCULAR; INTRAVENOUS at 07:47

## 2022-02-04 RX ADMIN — FENTANYL CITRATE 25 MCG: 50 INJECTION, SOLUTION INTRAMUSCULAR; INTRAVENOUS at 10:37

## 2022-02-04 RX ADMIN — SUCCINYLCHOLINE CHLORIDE 140 MG: 20 INJECTION, SOLUTION INTRAMUSCULAR; INTRAVENOUS at 07:41

## 2022-02-04 RX ADMIN — SODIUM CHLORIDE, POTASSIUM CHLORIDE, SODIUM LACTATE AND CALCIUM CHLORIDE: 600; 310; 30; 20 INJECTION, SOLUTION INTRAVENOUS at 07:30

## 2022-02-04 RX ADMIN — ONDANSETRON HYDROCHLORIDE 4 MG: 2 SOLUTION INTRAMUSCULAR; INTRAVENOUS at 10:35

## 2022-02-04 RX ADMIN — ONDANSETRON HYDROCHLORIDE 4 MG: 2 INJECTION, SOLUTION INTRAMUSCULAR; INTRAVENOUS at 07:50

## 2022-02-04 RX ADMIN — FENTANYL CITRATE 25 MCG: 50 INJECTION, SOLUTION INTRAMUSCULAR; INTRAVENOUS at 10:54

## 2022-02-04 RX ADMIN — ROCURONIUM BROMIDE 10 MG: 10 SOLUTION INTRAVENOUS at 09:28

## 2022-02-04 RX ADMIN — PROPOFOL 50 MG: 10 INJECTION, EMULSION INTRAVENOUS at 07:44

## 2022-02-04 RX ADMIN — Medication 120 MCG: at 07:50

## 2022-02-04 RX ADMIN — WATER 2 G: 1 INJECTION INTRAMUSCULAR; INTRAVENOUS; SUBCUTANEOUS at 08:00

## 2022-02-04 RX ADMIN — FENTANYL CITRATE 25 MCG: 50 INJECTION, SOLUTION INTRAMUSCULAR; INTRAVENOUS at 10:42

## 2022-02-04 RX ADMIN — OXYCODONE 5 MG: 5 TABLET ORAL at 11:30

## 2022-02-04 RX ADMIN — ONDANSETRON 4 MG: 2 INJECTION INTRAMUSCULAR; INTRAVENOUS at 10:35

## 2022-02-04 RX ADMIN — DEXAMETHASONE SODIUM PHOSPHATE 4 MG: 4 INJECTION, SOLUTION INTRAMUSCULAR; INTRAVENOUS at 07:50

## 2022-02-04 RX ADMIN — MIDAZOLAM 2 MG: 1 INJECTION INTRAMUSCULAR; INTRAVENOUS at 07:30

## 2022-02-04 RX ADMIN — ROCURONIUM BROMIDE 20 MG: 10 SOLUTION INTRAVENOUS at 07:40

## 2022-02-04 RX ADMIN — ROCURONIUM BROMIDE 10 MG: 10 SOLUTION INTRAVENOUS at 08:05

## 2022-02-04 RX ADMIN — HYDROMORPHONE HYDROCHLORIDE 0.3 MG: 2 INJECTION, SOLUTION INTRAMUSCULAR; INTRAVENOUS; SUBCUTANEOUS at 09:31

## 2022-02-04 RX ADMIN — METRONIDAZOLE 500 MG: 500 SOLUTION INTRAVENOUS at 08:00

## 2022-02-04 RX ADMIN — GLYCOPYRROLATE 0.2 MG: 0.2 INJECTION, SOLUTION INTRAMUSCULAR; INTRAVENOUS at 08:07

## 2022-02-04 RX ADMIN — LIDOCAINE HYDROCHLORIDE 80 MG: 20 INJECTION, SOLUTION EPIDURAL; INFILTRATION; INTRACAUDAL; PERINEURAL at 07:40

## 2022-02-04 RX ADMIN — HYDROMORPHONE HYDROCHLORIDE 0.5 MG: 2 INJECTION, SOLUTION INTRAMUSCULAR; INTRAVENOUS; SUBCUTANEOUS at 08:25

## 2022-02-04 RX ADMIN — ROCURONIUM BROMIDE 20 MG: 10 SOLUTION INTRAVENOUS at 07:54

## 2022-02-04 NOTE — PERIOP NOTES
Patient does not speak any [place]. She is here with her son. Interpreters used. ..[address], [id number], [id number].

## 2022-02-04 NOTE — ANESTHESIA PREPROCEDURE EVALUATION
Anesthetic History   No history of anesthetic complications            Review of Systems / Medical History  Patient summary reviewed, nursing notes reviewed and pertinent labs reviewed    Pulmonary        Sleep apnea  Smoker         Neuro/Psych         Psychiatric history     Cardiovascular    Hypertension                   GI/Hepatic/Renal  Within defined limits              Endo/Other    Diabetes    Obesity and arthritis     Other Findings              Physical Exam    Airway  Mallampati: II  TM Distance: > 6 cm  Neck ROM: normal range of motion   Mouth opening: Normal     Cardiovascular  Regular rate and rhythm,  S1 and S2 normal,  no murmur, click, rub, or gallop             Dental  No notable dental hx       Pulmonary  Breath sounds clear to auscultation               Abdominal  GI exam deferred       Other Findings            Anesthetic Plan    ASA: 3  Anesthesia type: general            Anesthetic plan and risks discussed with: Patient

## 2022-02-04 NOTE — H&P
Date of Surgery Update: Dank Mills was seen and examined. History and physical has been reviewed. The patient has been examined. There have been no significant clinical changes since the completion of the originally dated History and Physical.    Signed By: Zoey Bray MD     2022 7:22 AM         Please note from the office and include the additional information below:    Past Medical History  Past Medical History:   Diagnosis Date    Arthritis     shoulders and knees.  Cholecystitis 2022    Diabetes ([address]     insulin dependent     Diabetes ([address]     Hypercholesterolemia     Hypertension         Past Surgical History  Past Surgical History:   Procedure Laterality Date    COLONOSCOPY N/A 2019    [facility] at [facility] ENDOSCOPY    HX COLONOSCOPY      HX GYN      hysterectomy    HX GYN      hysterectomy, total for fibroid    [place] ABDOMEN SURGERY PROC UNLISTED              Social History  The patient Dank Mills  reports that she has been smoking cigarettes. She has a 82.00 pack-year smoking history. She has never used smokeless tobacco. She reports that she does not drink alcohol and does not use drugs.      Family History  Family History   Problem Relation Age of Onset    Diabetes Mother     Diabetes Father     Diabetes Sister     Diabetes Brother

## 2022-02-04 NOTE — OP NOTES
[address]   OPERATIVE REPORT    Name:  Julisa Salguero  MR#:  [id number]  :  1955  ACCOUNT #:  [de-identified]  DATE OF SERVICE:  2022    PREOPERATIVE DIAGNOSIS:  Symptomatic cholelithiasis. POSTOPERATIVE DIAGNOSIS:  Symptomatic cholelithiasis. PROCEDURES PERFORMED:  1. Laparoscopic cholecystectomy. 2.  Intraoperative cholangiogram.    SURGEON:  Franko Frias MD    ASSISTANT:  Kimmie Floyd SA    ANESTHESIA:  General endotracheal.    COMPLICATIONS:  None. SPECIMENS REMOVED:  Gallbladder to Pathology. IMPLANTS:  None. ESTIMATED BLOOD LOSS:  Approximately 20 mL. IV FLUIDS:  Crystalloids 800 mL. DRAINS:  None. INDICATIONS FOR SURGERY:  The patient is a 72-year-old female with symptomatic cholelithiasis. Ms. Mahendra Rosario is brought to the operating room at this time for laparoscopic cholecystectomy and intraoperative cholangiogram.  The risks of the procedure, including but not limited to, bleeding, infection, injury to the common bile duct and conversion to an open procedure were discussed in detail with the patient via a . Ms. Mahendra Rosario understood and wished to proceed. PROCEDURE:  After consent was obtained, the patient was brought to the operating room where she was placed in the supine position on the operating room table. Following the induction of an adequate level of general anesthesia via the endotracheal tube, compression devices were placed on both lower extremities. The abdomen was prepped with ChloraPrep and draped as a sterile field. Local anesthetic was infiltrated, and a small transverse incision below the umbilicus was opened sharply. Using the 5 mm non-bladed dilating trocar, access to the peritoneal cavity was achieved under direct vision. After an adequate level of carbon dioxide pneumoperitoneum had been achieved, the laparoscope was inserted. Inspection of the peritoneal contents revealed no focal abnormalities.   Local anesthetic was infiltrated again, and a 12 mm trocar and two 5 mm trocars were placed in the usual locations under direct vision. The operating room table was placed in the reverse Trendelenburg position and attention directed towards the gallbladder. Adhesions between the omentum and gallbladder were encountered, and these were taken down with the Harmonic scalpel. The gallbladder appeared thick-walled consistent with chronic cholecystitis. The gallbladder was markedly distended, and so it was decompressed with a needle aspirator. The gallbladder was then readily grasped and attention directed towards the cystic duct. This structure was identified, dissected free circumferentially and followed into the gallbladder. The cystic artery was subsequently identified, dissected free circumferentially and followed into the gallbladder as well. The gallbladder was mobilized, and the critical view was obtained. It was then decided to perform an intraoperative cholangiogram.  The Bland clamp was brought on the field and placed across the inferior aspect of the gallbladder. The catheter was inserted, and the cholangiogram was performed. Contrast was noted in the cystic duct and the common bile duct. Initially, no contrast was noted in the duodenum. Contrast was also noted in the common hepatic duct, the right and left hepatic ducts and the intrahepatic biliary radicles. The common bile duct was flushed with saline and the cholangiogram repeated. Again, contrast was noted in the cystic duct and the common bile duct. There was now contrast in the duodenum. There did not appear to be a filling defect in the distal common bile duct. Contrast was noted in the common hepatic duct, the right and left hepatic ducts and the intrahepatic biliary radicles. The catheter was removed, and the Bland clamp was removed as well.   The cystic duct was thick and so was divided with a single firing of the Endo PHONG stapler with a white cartridge. The staple line was inspected and found to be intact and hemostatic. The cystic artery was divided between three clips proximally and one distally. Using both the hook electrocautery and the Harmonic scalpel, the gallbladder was taken off of the liver and placed in an Endo Catch bag. The specimen was placed over the liver. The gallbladder fossa was inspected and several bleeders cauterized. The clips on the cystic artery stump were identified and found to be in place. The staple line on the cystic duct stump was identified and found to be intact and hemostatic. The gallbladder fossa was irrigated with saline, and 3 g of Mt were placed. The specimen was removed from the peritoneal cavity, passed off the field and submitted for histopathologic evaluation. The peritoneal cavity was inspected and found to be hemostatic. No other abnormalities were noted and so the trocars were all removed under direct vision. The pneumoperitoneum was evacuated and the wounds were irrigated with saline. The fascial defect at the 12 mm trocar site was closed with a running 0 Vicryl suture. The wound was closed with running 0 Vicryl suture followed by 4-0 Monocryl subcuticular suture to the skin. The three 5 mm trocar sites were closed with 4-0 Monocryl subcuticular suture to the skin. Additional local anesthetic was infiltrated and the four incisions were dressed with Dermabond. The patient was awakened from her general anesthetic and extubated in the operating room. She was transferred to the stretcher and brought to the recovery room in stable condition having tolerated the procedure well. At the conclusion of the procedure, all sponge counts, instrument counts and needle counts were reported as correct x2.       Teresita Reynaga MD [id number]  D:  02/04/2022 10:06  T:  02/04/2022 15:47  JOB #:  [id number]  CC:  MD Zeus Hernandez MD

## 2022-02-04 NOTE — ANESTHESIA POSTPROCEDURE EVALUATION
Post-Anesthesia Evaluation and Assessment    Patient: Yumiko Phillips MRN: [id number]  SSN: [id number]    YOB: 1955  Age: 79 y.o. Sex: female      I have evaluated the patient and they are stable and ready for discharge from the PACU. Cardiovascular Function/Vital Signs  Visit Vitals  /69   Pulse 91   Temp 36.1 °C (96.9 °F)   Resp 15   Ht 5' 4\" (1.626 m)   Wt 91.2 kg (201 lb 1 oz)   SpO2 (!) 85%   BMI 34.51 kg/m²       Patient is status post General anesthesia for Procedure(s):  LAPAROSCOPIC CHOLECYSTECTOMY, INTRAOPERATIVE CHOLANGIOGRAMS. Nausea/Vomiting: None    Postoperative hydration reviewed and adequate. Pain:  Pain Scale 1: Adult Nonverbal Pain Scale (02/04/22 1054)  Pain Intensity 1: 0 (02/04/22 0722)   Managed    Neurological Status:   Neuro (WDL): (P) Within Defined Limits (02/04/22 1054)   At baseline    Mental Status, Level of Consciousness: Alert and  oriented to person, place, and time    Pulmonary Status:   O2 Device: Nasal cannula (02/04/22 1045)   Adequate oxygenation and airway patent    Complications related to anesthesia: None    Post-anesthesia assessment completed.  No concerns    Signed By: Mely Alva MD     February 4, 2022

## 2022-02-04 NOTE — DISCHARGE INSTRUCTIONS
Patient Discharge Instructions    Thomas Reese / [id number] : 1955    Admitted 2022 Discharged: 2022       · It is important that you take the medication exactly as they are prescribed. · Keep your medication in the bottles provided by the pharmacist and keep a list of the medication names, dosages, and times to be taken in your wallet. · Do not take other medications without consulting your doctor. What to do at Home    Recommended diet: As Directed. Recommended activity: No Heavy Lifting (Less Than 10-15 Pounds). No Driving While Taking Oxycodone. Tylenol 1000 mg every 6 hours as needed for pain. Ice pack to abdomen as needed. Oxycodone as needed for severe pain. May Take Shower or Storden Roxo. If you experience any of the following symptoms Fevers, Chills, Nausea, Vomitting, Redness or Drainage at Surgical Site(s) or Any Other Questions or Concerns Please Call -  [phone]. Follow-up with Dr. Argenis Carrillo in 10-14 days. Information obtained by :  I understand that if any problems occur once I am at home I am to contact my physician. I understand and acknowledge receipt of the instructions indicated above.                                                                                                                                            Physician's or R.N.'s Signature                                                                  Date/Time                                                                                                                                              Patient or Representative Signature                                                          Date/Time          DISCHARGE SUMMARY from Nurse    PATIENT INSTRUCTIONS:    After general anesthesia or intravenous sedation, for 24 hours or while taking prescription Narcotics:  · Limit your activities  · Do not drive and operate hazardous machinery  · Do not make important personal or business decisions  · Do  not drink alcoholic beverages  · If you have not urinated within 8 hours after discharge, please contact your surgeon on call.     Report the following to your surgeon:  · Excessive pain, swelling, redness or odor of or around the surgical area  · Temperature over 100.5  · Nausea and vomiting lasting longer than 4 hours or if unable to take medications  · Any signs of decreased circulation or nerve impairment to extremity: change in color, persistent  numbness, tingling, coldness or increase pain  · Any questions

## 2022-02-04 NOTE — BRIEF OP NOTE
Brief Postoperative Note    Patient: Yvonne Woodall  YOB: 1955  MRN: [id number]    Date of Procedure: 2/4/2022     Pre-Op Diagnosis:  Symptomatic Cholelithiasis. Post-Op Diagnosis:  Same. Procedure(s):   Laparoscopic Cholecystectomy. Intra Operative Cholangiogram.    Surgeon(s):  Cynthia Velez MD    Surgical Assistant:  Margarita Sanchez SA. Anesthesia: General     Estimated Blood Loss (mL): Approximately 20 ml. Complications: None    Specimens:   ID Type Source Tests Collected by Time Destination   1 : gallbladder and gallstones Fresh Gallbladder  Cynthia Velez MD 2/4/2022 1435 Pathology        Implants: * No implants in log *    Drains: * No LDAs found *    Findings: Chronic cholecystitis. Critical view obtained.  No apparent retained CBD stone on cholangiogram.    Electronically Signed by Mireille Wise MD on 2/4/2022 at 9:57 AM

## 2022-02-21 ENCOUNTER — OFFICE VISIT (OUTPATIENT)
Dept: SURGERY | Age: 67
End: 2022-02-21
Payer: MEDICAID

## 2022-02-21 VITALS
SYSTOLIC BLOOD PRESSURE: 131 MMHG | BODY MASS INDEX: 33.03 KG/M2 | TEMPERATURE: 98 F | DIASTOLIC BLOOD PRESSURE: 83 MMHG | OXYGEN SATURATION: 96 % | WEIGHT: 193.5 LBS | RESPIRATION RATE: 20 BRPM | HEART RATE: 88 BPM | HEIGHT: 64 IN

## 2022-02-21 DIAGNOSIS — Z90.49 S/P LAPAROSCOPIC CHOLECYSTECTOMY: ICD-10-CM

## 2022-02-21 DIAGNOSIS — Z09 FOLLOW-UP EXAMINATION AFTER ABDOMINAL SURGERY: Primary | ICD-10-CM

## 2022-02-21 PROCEDURE — 99024 POSTOP FOLLOW-UP VISIT: CPT

## 2022-02-21 NOTE — PROGRESS NOTES
1. Have you been to the ER, urgent care clinic since your last visit? Hospitalized since your last visit? No    2. Have you seen or consulted any other health care providers outside of the [address] [place] [address] since your last visit? Include any pap smears or colon screening.  No       No bm for 10 days, has not taken a laxative, passing some watery stool now, no appetite

## 2022-02-21 NOTE — PROGRESS NOTES
Subjective: Oral Sadler is a 79 y.o. female presents for postop care 3 weeks following Laparoscopic Cholecystectomy with Intra Operative Cholangiogram.   Information obtained from patient and patient's son via blue phone . Patient denies fever, chest pain, or shortness of breath. Appetite is fair. Pt reports that she never has much of an appetite, but that she has been this way for a long time preoperatively. Denies nausea/vomiting. She reports feeling a little bloated and has been having liquid brown stools, but feels like this may be improving. The patient is voiding without difficulty. The patient is not having any pain to her abdomen, but she feels like her knee pain has worsened making it harder for her to get around. Ms. Migue Nicholson has a reminder for a \"due or due soon\" health maintenance. I have asked that she contact her primary care provider for follow-up on this health maintenance. Objective:     Visit Vitals  /83   Pulse 88   Temp 98 °F (36.7 °C)   Resp 20   Ht 5' 4\" (1.626 m)   Wt 193 lb 8 oz (87.8 kg)   LMP  (LMP Unknown)   SpO2 96%   BMI 33.21 kg/m²       General: alert, cooperative, no distress, appears stated age  CV: Regular rate and rhythm  Pulmonary: Lungs clear to auscultation   Abdomen:soft, bowel sounds active, non-tender  Incision:  Lap sites C/D/I, healing well, no drainage, no erythema, no dehiscence, incisions well approximated    Assessment:     s/p laparoscopic cholecystectomy with Intra operative cholangiogram     Plan:     Reviewed pathology with patient     Gallbladder, cholecystectomy:         Benign gallbladder with dense chronic and histiocytic inflammation         and wall fibrosis; Cholelithiasis  Wound care discussed. May get incisions wet. Moisturize as needed   Avoid heavy lifting and or straining anything > about 15 lbs for another 2 weeks. Increase activities as tolerated.   Cautioned pt regarding eating fatty / fried foods as can cause some stomach upset and diarrhea. We discussed the importance of proper diet post op. Pt instructed to follow up with her primary care provider regarding refilling all her regular medications (including medications for diabetes and cholesterol) and questions regarding her arthritic knee pains. RTC PRN    All questions were personally answered. Thank you for allowing us to participate in the care of your patient.     > 45 minutes were spent with patient with greater than 50% of that time spent face to face counseling    Dulce Machuca NP  [facility]   2/21/2022

## 2022-02-21 NOTE — PATIENT INSTRUCTIONS
Recommendations after abdominal surgery:    -  Avoid heavy lifting and or straining anything > about 15 lbs for another 2 weeks     -  Avoid abdominal exercises for another 2 weeks     -  Caution with fatty / fried foods as can cause some stomach upset and diarrhea     -  Ok to bath as normal     -  Ok to moisturize the incisions as desired     -  Please follow up with your primary care provider regarding refilling all your home medications and   questions regarding your arthritic knee pains    -  Follow up only as needed        ÊæÕíÇÊ ÈÚÏ ÌÑÇÍÉ ÇáÈØä:     - ÊÌäÈ ÑÝÚ ÇáÃÔíÇÁ ÇáËÞíáÉ æ / Ãæ ÅÌåÇÏ Ãí ÔíÁ> ÍæÇáí 15 ÑØáÇð áãÏÉ ÃÓÈæÚíä ÂÎÑíä    - ÊÌäÈ ÊãÇÑíä ÇáÈØä áãÏÉ ÃÓÈæÚíä ÅÖÇÝííä    - ÇáÍÐÑ ãä ÇáÃØÚãÉ ÇáÏÓãÉ / ÇáãÞáíÉ Javon Meetch íãßä Ãä ÊÓÈÈ ÈÚÖ ÇÖØÑÇÈÇÊ ÇáãÚÏÉ [id number]    - ØíÈ [id number] ßÇáãÚÊÇÏ    - ØíÈ áÊÑØíÈ ÇáÌÑæÍ ÍÓÈ ÇáÑÛÈÉ    - ÇáãÊÇÈÚÉ ÝÞØ ÍÓÈ IDPISO

## 2022-03-07 ENCOUNTER — OFFICE VISIT (OUTPATIENT)
Dept: FAMILY MEDICINE CLINIC | Age: 67
End: 2022-03-07
Payer: MEDICAID

## 2022-03-07 VITALS
TEMPERATURE: 97.5 F | HEART RATE: 94 BPM | RESPIRATION RATE: 18 BRPM | DIASTOLIC BLOOD PRESSURE: 65 MMHG | OXYGEN SATURATION: 97 % | SYSTOLIC BLOOD PRESSURE: 116 MMHG | BODY MASS INDEX: 33.12 KG/M2 | HEIGHT: 64 IN | WEIGHT: 194 LBS

## 2022-03-07 DIAGNOSIS — E55.9 VITAMIN D DEFICIENCY: ICD-10-CM

## 2022-03-07 DIAGNOSIS — Z71.6 ENCOUNTER FOR SMOKING CESSATION COUNSELING: ICD-10-CM

## 2022-03-07 DIAGNOSIS — R35.0 FREQUENCY OF URINATION: ICD-10-CM

## 2022-03-07 DIAGNOSIS — K21.00 GASTROESOPHAGEAL REFLUX DISEASE WITH ESOPHAGITIS WITHOUT HEMORRHAGE: ICD-10-CM

## 2022-03-07 DIAGNOSIS — E78.5 DYSLIPIDEMIA (HIGH LDL; LOW HDL): ICD-10-CM

## 2022-03-07 DIAGNOSIS — Z13.29 SCREENING FOR THYROID DISORDER: ICD-10-CM

## 2022-03-07 DIAGNOSIS — E11.9 DIABETIC EYE EXAM (HCC): ICD-10-CM

## 2022-03-07 DIAGNOSIS — F33.9 MAJOR DEPRESSIVE DISORDER, RECURRENT EPISODE WITH ANXIOUS DISTRESS (HCC): ICD-10-CM

## 2022-03-07 DIAGNOSIS — B37.89 CANDIDA RASH OF GROIN: ICD-10-CM

## 2022-03-07 DIAGNOSIS — F32.A DEPRESSION, UNSPECIFIED DEPRESSION TYPE: ICD-10-CM

## 2022-03-07 DIAGNOSIS — I10 HYPERTENSION, WELL CONTROLLED: ICD-10-CM

## 2022-03-07 DIAGNOSIS — Z01.00 DIABETIC EYE EXAM (HCC): ICD-10-CM

## 2022-03-07 LAB — GLUCOSE POC: 375 MG/DL

## 2022-03-07 PROCEDURE — 99214 OFFICE O/P EST MOD 30 MIN: CPT | Performed by: INTERNAL MEDICINE

## 2022-03-07 PROCEDURE — 82962 GLUCOSE BLOOD TEST: CPT | Performed by: INTERNAL MEDICINE

## 2022-03-07 RX ORDER — CHLORPHENIRAMINE MALEATE 4 MG
TABLET ORAL 2 TIMES DAILY
Qty: 60 G | Refills: 1 | Status: SHIPPED | OUTPATIENT
Start: 2022-03-07

## 2022-03-07 RX ORDER — BUPROPION HYDROCHLORIDE 150 MG/1
TABLET, EXTENDED RELEASE ORAL
Qty: 60 TABLET | Refills: 5 | Status: SHIPPED | OUTPATIENT
Start: 2022-03-07

## 2022-03-07 RX ORDER — METFORMIN HYDROCHLORIDE 1000 MG/1
TABLET ORAL
Qty: 60 TABLET | Refills: 3 | Status: SHIPPED | OUTPATIENT
Start: 2022-03-07 | End: 2022-03-24 | Stop reason: SDUPTHER

## 2022-03-07 RX ORDER — PHENOL/SODIUM PHENOLATE
20 AEROSOL, SPRAY (ML) MUCOUS MEMBRANE
Qty: 90 TABLET | Refills: 1 | Status: SHIPPED | OUTPATIENT
Start: 2022-03-07 | End: 2022-08-29

## 2022-03-07 RX ORDER — LISINOPRIL 5 MG/1
5 TABLET ORAL DAILY
Qty: 90 TABLET | Refills: 1 | Status: SHIPPED | OUTPATIENT
Start: 2022-03-07

## 2022-03-07 NOTE — PROGRESS NOTES
Chief Complaint   Patient presents with    Follow-up     routine check up     Ear Fullness     itchy    Memory Loss    Diabetes     HPI:  Joselin Wilkinson is a 79 y.o. middle [place] female with medical history below presents for routine follow-up. Blood pressure is at goal. Last A1C in  was 10.4% indicating poorly controlled diabetes. Blood sugar today is 375 mg/dl  Patient has c/o ear fullness and itching. Also, she has additional complaints of memory impairment. Review of Systems  As per hpi    Past Medical History:   Diagnosis Date    Arthritis     shoulders and knees.  Cholecystitis 2022    Diabetes ([address]     insulin dependent     Diabetes ([address]     Hypercholesterolemia     Hypertension      Past Surgical History:   Procedure Laterality Date    COLONOSCOPY N/A 2019    COLONOSCOPY performed by [facility] at [facility] ENDOSCOPY    HX CHOLECYSTECTOMY  2022    by Dr Jamison Luna      hysterectomy    HX GYN      hysterectomy, total for fibroid    NE ABDOMEN SURGERY [address] UNLISTED           Social History     Socioeconomic History    Marital status:    Tobacco Use    Smoking status: Current Every Day Smoker     Packs/day: 2.00     Years: 41.00     Pack years: 82.00     Types: Cigarettes    Smokeless tobacco: Never Used   Vaping Use    Vaping Use: Never used   Substance and Sexual Activity    Alcohol use: No    Drug use: No    Sexual activity: Never   Social History Narrative    ** Merged History Encounter **          Family History   Problem Relation Age of Onset    Diabetes Mother     Diabetes Father     Diabetes Sister     Diabetes Brother      Current Outpatient Medications   Medication Sig Dispense Refill    acetaminophen (Tylenol Extra Strength) 500 mg tablet Take 2 Tablets by mouth every six (6) hours as needed for Pain.  (Patient not taking: Reported on 2022) 20 Tablet 2    metFORMIN (GLUCOPHAGE) 1,000 mg tablet TAKE 1 TABLET BY MOUTH TWICE A DAY WITH MEALS 60 Tablet 3    pravastatin (PRAVACHOL) 20 mg tablet TAKE 1 TABLET BY MOUTH EVERY DAY AT NIGHT (Patient not taking: Reported on 2/21/2022) 30 Tablet 4    insulin aspart protamine/insulin aspart (NovoLOG Mix 70-30FlexPen U-100) 100 unit/mL (70-30) inpn 40 units at noon (first meal) and 40 units at 7 pm (second meal) 10 Pen 6    Omeprazole delayed release (PRILOSEC D/R) 20 mg tablet Take 1 Tablet by mouth daily as needed (acid reflux). 90 Tablet 1    albuterol (PROVENTIL HFA, VENTOLIN HFA, PROAIR HFA) 90 mcg/actuation inhaler Take 1 Puff by inhalation every six (6) hours as needed for Wheezing. 1 Inhaler 3    fluticasone propion-salmeteroL (Advair HFA) 230-21 mcg/actuation inhaler Take 2 Puffs by inhalation two (2) times a day. 1 Each 2    buPROPion SR (WELLBUTRIN SR) 150 mg SR tablet TAKE 1 TAB DAILY X 1 WEEK THEN INCREASE TO 1 TABLET TWICE DAILY- FOR DEPRESSION & SMOKING (Patient not taking: Reported on 2/4/2022) 60 Tablet 5    cholecalciferol (VITAMIN D3) (5000 Units /125 mcg) capsule Take 1 Capsule by mouth daily. (Patient not taking: Reported on 2/21/2022) 30 Capsule 3    gabapentin (NEURONTIN) 100 mg capsule Take 2 Capsules by mouth three (3) times daily. (Patient not taking: Reported on 2/21/2022) 90 Capsule 0    lisinopriL (PRINIVIL, ZESTRIL) 5 mg tablet Take 1 Tablet by mouth daily. (Patient not taking: Reported on 2/21/2022) 90 Tablet 1    meloxicam (MOBIC) 15 mg tablet Take 1 Tablet by mouth daily. (Patient not taking: Reported on 2/21/2022) 20 Tablet 1    clotrimazole (LOTRIMIN) 1 % topical cream Apply  to affected area two (2) times a day. 60 g 1    polyethylene glycol (MIRALAX) 17 gram/dose powder Take 17 g by mouth daily.  (Patient not taking: Reported on 2/21/2022) 30 g 3    Blood-Glucose Meter (FreeStyle System Kit) monitoring kit Monitor blood sugar 3 times daily 1 Kit 0    glucose blood VI test strips (FreeStyle Test) strip Monitor blood sugar 3 times daily. E11.65, Z79.4 200 Strip 10    glucose blood VI test strips (Freestyle InsuLinx) strip Check sugar 3 times a day 300 Strip 4    FreeStyle Lancets 28 gauge misc TESTING 3 TIMES A DAY. E11.65 100 Lancet 2    methocarbamoL (ROBAXIN) 750 mg tablet Take 1 Tab by mouth four (4) times daily. (Patient not taking: Reported on 2/21/2022) 30 Tab 1    acetaminophen (TYLENOL) 325 mg tablet TAKE 3 TABLETS BY MOUTH EVERY 8 HOURS (Patient not taking: Reported on 2/4/2022)      lidocaine (LIDODERM) 5 % APPLYT 1 PATCH TOPICALLY EVERY DAY      Insulin Needles, Disposable, (BD ULTRA-FINE MINI PEN NEEDLE) 31 gauge x 3/16\" ndle Use to inject insulin twice daily 200 Pen Needle 3    lancets 32 gauge misc 1 Each by Does Not Apply route three (3) times daily.  300 Lancet 3    Blood-Glucose Meter (FREESTYLE FLASH SYSTEM) monitoring kit Test 3 Times Daily   DX E11.40,  E11.65 1 Kit 0     No Known Allergies    Objective:  Visit Vitals  /65   Pulse 94   Temp 97.5 °F (36.4 °C) (Temporal)   Resp 18   Ht 5' 4\" (1.626 m)   Wt 194 lb (88 kg)   LMP  (LMP Unknown)   SpO2 97%   BMI 33.30 kg/m²     Physical Exam:   General appearance - alert, well appearing in no distress  Mental status - alert, oriented to person, place, and time  EYE-PERRL, EOMI  Chest - clear to auscultation, no wheezes, rales or rhonchi  Heart - normal rate, regular rhythm, no murmurs  Abdomen - soft, nontender, nondistended, no organomegaly  Ext-peripheral pulses normal, no pedal edema  Neuro - no focal findings  Back-full range of motion, no tenderness, palpable spasm or pain on motion     Results for orders placed or performed in visit on 03/07/22   AMB POC GLUCOSE BLOOD, BY GLUCOSE MONITORING DEVICE   Result Value Ref Range    Glucose  MG/DL     Assessment/Plan:  Diagnoses and all orders for this visit:    DM type 2, uncontrolled, with neuropathy (HCC)  -     AMB POC GLUCOSE BLOOD, BY GLUCOSE MONITORING DEVICE  -     metFORMIN (GLUCOPHAGE) 1,000 mg tablet; TAKE 1 TABLET BY MOUTH TWICE A DAY WITH MEALS, Print, Disp-60 Tablet, R-3  -     METABOLIC PANEL, COMPREHENSIVE; Future    Hypertension, well controlled  -     lisinopriL (PRINIVIL, ZESTRIL) 5 mg tablet; Take 1 Tablet by mouth daily. , Print, Disp-90 Tablet, R-1    Candida rash of groin  -     clotrimazole (LOTRIMIN) 1 % topical cream; Apply  to affected area two (2) times a day., Print, Disp-60 g, R-1    Gastroesophageal reflux disease with esophagitis without hemorrhage  -     Omeprazole delayed release (PRILOSEC D/R) 20 mg tablet; Take 1 Tablet by mouth daily as needed (acid reflux). , Print, Disp-90 Tablet, R-1    Depression, unspecified depression type  -     buPROPion SR (WELLBUTRIN SR) 150 mg SR tablet; TAKE 1 TAB DAILY X 1 WEEK THEN INCREASE TO 1 TABLET TWICE DAILY- FOR DEPRESSION & SMOKING, Print, Disp-60 Tablet, R-5DX Code Needed  . Encounter for smoking cessation counseling  -     buPROPion SR (WELLBUTRIN SR) 150 mg SR tablet; TAKE 1 TAB DAILY X 1 WEEK THEN INCREASE TO 1 TABLET TWICE DAILY- FOR DEPRESSION & SMOKING, Print, Disp-60 Tablet, R-5DX Code Needed  . Dyslipidemia (high LDL; low HDL)  -     LIPID PANEL; Future    Major depressive disorder, recurrent episode with anxious distress (HCC)  -     buPROPion SR (WELLBUTRIN SR) 150 mg SR tablet; TAKE 1 TAB DAILY X 1 WEEK THEN INCREASE TO 1 TABLET TWICE DAILY- FOR DEPRESSION & SMOKING, Print, Disp-60 Tablet, R-5DX Code Needed  . -     METABOLIC PANEL, COMPREHENSIVE; Future    Vitamin D deficiency  -     VITAMIN D, 25 HYDROXY; Future    Screening for thyroid disorder  -     TSH 3RD GENERATION; Future    Frequency of urination  -     URINALYSIS W/ RFLX MICROSCOPIC; Future    Diabetic eye exam ([facility] [address]  -     REFERRAL TO OPHTHALMOLOGY      Patient Instructions       Miriam Galdamez:  ÅÑÔÇÏÇÊ ÇáÑÚÇíÉ  Type 2 Diabetes: Care Instructions  ÅÑÔÇÏÇÊ ÇáÑÚÇíÉ Keenan Èß  Miriam Galdamez åæ Miriam ParsonsæYAYO [facility] ÊÊãßä ÃäÓÌÉ ÇáÌÓã ãä ÇÓÊÎÏÇã ÇáÃäÓæáíä ÈÔßá ÕÍíÍ. æãÚ ãÑæÑ ÇáæÞÊ¡ áÇ íÓÊØíÚ ÇáÈäßÑíÇÓ ÅäÊÇÌ ÇáÃäÓæáíä ÈÔßáò ßÇÝò. æÇáÃäÓæáíä åæ åÑãæä íÓÇÚÏ ÎáÇíÇ ÇáÌÓã Úáì ÇÓÊÎÏÇã ÇáÓßÑ (XOKITZPS) ãä ÃÌá ÅäÊÇÌ ÇáØÇÞÉ. ßãÇ Ãäå íÓÇÚÏ ÇáÌÓã Úáì ÊÎÒíä ÇáÓßÑ ÇáÒÇÆÏ Ýí ÎáÇíÇ ÇáßÈÏ¡ WUSNFLJI¡ æÇáÏåæä. æãä Ïæä BOPZPLWQL¡ áÇ íãßä Ãä ÊÍÕá ÇáÎáÇíÇ Úáì äÓÈÉ ÇáÓßÑ ÇááÇÒãÉ ááÞíÇã ÈÚãáåÇ. æÈÏáÇð ãä Ðáß¡ íÙá ÇáÓßÑ Ýí ÇáÏã. æåæ ãÇ íãßä Ãä íÓÈÈ ÇÑÊÝÇÚ ãÓÊæíÇÊ ÇáÓßÑ Ýí ÇáÏã. æíÚÏ ÇáÔÎÕ ãÕÇÈðÇ ÈãÑÖ ÇáÓßÑí ÚäÏãÇ íÈÞì ÇáÓßÑ Ýí ÇáÏã ãÑÊÝÚðÇ ááÛÇíÉ ãÚÙã ÇáæÞÊ. æãÚ ãÑæÑ ÇáæÞÊ¡ íãßä Ãä íÄÏí ãÑÖ ÇáÓßÑí Åáì ÇáÅÕÇÈÉ ÈÃãÑÇÖ ÇáÞáÈ¡ æÇáÃæÚíÉ ÇáÏãæíÉ¡ OIQZHXYH¡ æÇáßáì¡ æÇáÚíäíä. ÞÏ íãßäß ÇáÓíØÑÉ Úáì äÓÈÉ ÇáÓßÑ Ýí ÇáÏã Úä ØÑíÞ ÎÓÑÇä ÇáæÒä¡ æÇÊÈÇÚ äÙÇã ÛÐÇÆí ÕÍí¡ æããÇÑÓÉ ÇáÊãÇÑíä ÇáÑíÇÖíÉ ÇáíæãíÉ. ßãÇ ÞÏ íáÒãß ÊäÇæá ÇáÃäÓæáíä Ãæ ÃÏæíÉ ÃÎÑì áÚáÇÌ ãÑÖ ÇáÓßÑí. ÊõÚÏ ÑÚÇíÉ ÇáãÊÇÈÚÉ ÌÒÁðÇ ÃÓÇÓíðÇ Ýí ÚáÇÌß æÓáÇãÊß. ÝÇÍÑÕ Úáì ÊÑÊíÈ ÌãíÚ ãæÇÚíÏ ÒíÇÑÉ ÇáØÈíÈ HUJWSKVKH ÈåÇ¡ æÇÊÕá ÈØÈíÈß ÅÐÇ ßäÊ ÊÚÇäí ãä ÃíÉ ãÔßáÇÊ. æãä ÇáÌíÏ ÃíÖðÇ Ãä ÊÚÑÝ äÊÇÆÌ KXANTGFY æßÐáß KJBPGQHU ÈÞÇÆãÉ ÇáÃÏæíÉ ÇáÊí PMGDBXMA. ßíÝ íãßäß ÇáÇÚÊäÇÁ ÈäÝÓß Ýí JLRALJ¿   ÍÇÝÙ Úáì ãÚÏá ÇáÓßÑ Ýí ÇáÏã ÚäÏ ÇáãÓÊæì ÇáãÓÊåÏÝ (ÇáÐí ÊÍÏÏå ãÚ ÇáØÈíÈ). o ÇÊÈÚ äÙÇãðÇ ÛÐÇÆíðÇ ÌíÏðÇ ÈÍíË íæÒÚ ÊäÇæá ÇáãæÇÏ ÇáÛäíÉ ÈÇáßÑÈæåíÏÑÇÊ Úáì ãÏÇÑ Çáíæã. ÊÄËÑ ÇáßÑÈæåíÏÑÇÊ¡ ÇáÊí ÊÚÏ ÇáãÕÏÑ ÇáÑÆíÓ ááØÇÞÉ Ýí ÇáÌÓã¡ Úáì äÓÈÉ ÇáÓßÑ Ýí ÇáÏã ÃßËÑ ãä ÇáÚäÇÕÑ ÇáÛÐÇÆíÉ ÇáÃÎÑì. æÊæÌÏ ÇáßÑÈæåíÏÑÇÊ Ýí ÇáÝæÇßå¡ EAATQEHUQ¡ æÇáÍáíÈ¡ æÇáÒÈÇÏí. ßãÇ ÊæÌÏ ÃíÖðÇ Ýí ÇáÎÈÒ¡ GAXIXFV¡ TQKOPFWJG¡ ãËá ÇáÈØÇØÓ¡ æÇáÐÑÉ¡ æÇáÃØÚãÉ ÇáÓßÑíÉ ãËá ÇáÍáæì æÇáßÚß. o ÍÇæá ÌÇåÏðÇ ããÇÑÓÉ ÇáÊãÇÑíä ÇáÑíÇÖíÉ áãÏÉ 30 ÏÞíÞÉð Ýí ãÚÙã ÃíÇã ÇáÃÓÈæÚ¡ æíõÝÖøá Ýí ÌãíÚ ÃíÇã ÇáÃÓÈæÚ. æíõÚÏ ÇáãÔí ÎíÇÑðÇ ÌíÏðÇ. æÞÏ ÊÑÛÈ ÃíÖðÇ Ýí ÇáÞíÇã ÈÃäÔØÉ ÃÎÑì¡ ãËá ÇáÌÑí Ãæ DRJMSYN Ãæ ÑßæÈ AIJJTBGX Ãæ áÚÈ ÇáÊäÓ Ãæ ÇáÑíÇÖÇÊ ÇáÌãÇÚíÉ. ÅÐÇ ÞÇá ÇáØÈíÈ Åäå ãä ÇáãäÇÓÈ ÃÏÇÁ ÊãÇÑíä ÅØÇáÉ VUDEXLQ¡ ÝÞã ÈåÇ ãÑÊíä ÃÓÈæÚíðÇ Úáì ÇáÃÞá. o ÊäÇæá ÇáÃÏæíÉ ÊãÇãðÇ ÍÓÈ ÊæÌíåÇÊ ÇáØÈíÈ.  ÇÊÕá ÈÇáØÈíÈ ÅÐÇ ßäÊ ÊÚÊÞÏ Ãäß ÊæÇÌå ãÔßáÉ ÊÊÚáÞ ÈÇáÏæÇÁ. ÓÊÍÕá Úáì ãÒíÏ ãä ÇáÊÝÇÕíá Íæá ÇáÃÏæíÉ ÇáãÍÏÏÉ ÇáÊí íÕÝåÇ ÇáØÈíÈ.  ÇÝÍÕ äÓÈÉ ÇáÓßÑ Ýí ÇáÏã ÈÔßá ãÊßÑÑ ØÇáãÇ íæÕí ÇáØÈíÈ ÈÐáß. æãä Çáãåã Ãä ÊæÇÕá ÊÊÈÚ Ãí ÃÚÑÇÖ ÊÚÇäí ãäåÇ¡ ãËá ÇäÎÝÇÖ ÇáÓßÑ Ýí ÇáÏã¡ æÃí ÊÛííÑÇÊ Ýí ÇáÃäÔØÉ ÇáÊí ÊÞæã ÈåÇ¡ Ãæ ÇáäÙÇã ÇáÛÐÇÆí¡ Ãæ ÇÓÊÎÏÇã ÇáÃäÓæáíä.  ÇÓÊÔÑ ØÈíÈß ÞÈá ÇáÈÏÁ Ýí ÊäÇæá ÇáÃÓÈíÑíä íæãíðÇ. íãßä Ãä íÓÇÚÏ ÇáÃÓÈíÑíä ÈÚÖ ÇáÃÔÎÇÕ Ýí ÎÝÖ ÎØÑ ÅÕÇÈÊåã ÈÓßÊÉ ÏãÇÛíÉ Ãæ ÃÒãÉ ÞáÈíÉ. æáßä ÊäÇæá ÇáÃÓÈíÑíä áíÓ ãäÇÓÈðÇ áÌãíÚ JAHEUTH¡ æÐáß áÃäå íãßä Ãä íÓÈÈ äÒíÝðÇ ÍÇÏðÇ.  ÃÞáÚ Úä ÇáÊÏÎíä. ÅÐÇ ßäÊ ÈÍÇÌÉ Åáì ÇáãÓÇÚÏÉ ááÅÞáÇÚ Úä ÇáÊÏÎíä¡ ÝÊÍÏË ãÚ ÇáØÈíÈ Íæá ÇáÈÑÇãÌ æÇáÃÏæíÉ ÇáÎÇÕÉ ÈÇáÊæÞÝ Úä ÇáÊÏÎíä. íãßä Ãä íÒíÏ åÐÇ ãä ÝÑÕ ÇáÅÞáÇÚ Úä ÇáÊÏÎíä äåÇÆíðÇ.  ÍÇÝÙ Úáì äÓÈÉ [id number] æÖÛØ ÇáÏã ÚäÏ ÇáãÓÊæíÇÊ ÇáØÈíÚíÉ. ÞÏ ÊÍÊÇÌ Åáì ÊäÇæá ÏæÇÁ Ãæ ÃßËÑ KAUUWH Åáì ÇáÃåÏÇÝ ÇáãÑÌæÉ. ÊäÇæá ÇáÃÏæíÉ æÝÞðÇ áÊæÌíåÇÊ ÇáØÈíÈ ÈÇáÖÈØ. áÇ ÊæÞÝ ÊäÇæá ÇáÏæÇÁ Ãæ ÊÛíÑå Ïæä ÇÓÊÔÇÑÉ ÇáØÈíÈ ÃæáÇð. ãÊì íäÈÛí Úáíß SUXRPFB áØáÈ ÇáãÓÇÚÏÉ¿  ÇáÇÊÕÇá ÈÑÞã 911 ÇÊÕá ÈÇáÑÞã 911 Ýí Ãí æÞÊ ÊÚÊÞÏ Ãäß ÈÍÇÌÉ Åáì ÑÚÇíÉ ØÇÑÆÉ. Úáì ÓÈíá PQHENT¡ ÇÊÕá Ýí QWWSRWJ EAHRKWF:   HKGHOVM NCISFCQY (ÝÞÏÇä ÇáæÚí)¡ Ãæ Ãäß ÃÕÈÍÊ ÝÌÃÉ ßËíÑ Çáäæã Ãæ ãÖØÑÈðÇ ááÛÇíÉ. (ÞÏ Êßæä äÓÈÉ ÇáÓßÑ ãäÎÝÖÉ ÌÏðÇ Ýí ÇáÏã.)  ÇÊÕá ÈÇáØÈíÈ ÇáÂä Ãæ ÇáÊãÓ ÇáÑÚÇíÉ ÇáØÈíÉ ÇáÝæÑíÉ Ýí ÇáÍÇáÇÊ ÇáÊÇáíÉ:   ÅÐÇ ßÇä ãÓÊæì ÇáÓßÑ Ýí ÇáÏã íÓÇæí 300 ãÌã/ÏíÓíáÊÑ Ãæ ÃÚáì ãä ÇáãÓÊæì ÇáÐí ÍÏÏå áß ÇáØÈíÈ.  ÅÐÇ ßÇäÊ áÏíß ÃÚÑÇÖ ÇäÎÝÇÖ ÇáÓßÑ Ýí ÇáÏã¡ ãËá:   o ÇáÊÚÑÞ. o ÇáÔÚæÑ ÈÇáÊæÊÑ¡ æÇáÊæÚß¡ æÇáÖÚÝ. o ÇáÌæÚ ÇáÔÏíÏ æÇáÛËíÇä ÇáØÝíÝ. o ÇáÏæÎÉ æÇáÕÏÇÚ. o ÇáÑÄíÉ ÛíÑ ÇáæÇÖÍÉ. o ÇáÊÔæÔ. ÑÇÞÈ ÌíÏðÇ Ãí ÊÛíÑÇÊ ÊØÑÃ Úáì ÕÍÊß¡ æÇÍÑÕ Úáì BSDQISQ ÈÇáØÈíÈ Ýí JQXSJDH ÇáÊÇáíÉ:   ÅÐÇ ßäÊ ÊÚÇäí ÛÇáÈðÇ ãä ãÔßáÇÊ Ýí ÇáÓíØÑÉ Úáì äÓÈÉ ÇáÓßÑ Ýí ÇáÏã.  ÅÐÇ ßäÊ ÊÚÇäí ãä ÇáÃÚÑÇÖ ÇáÊí ÊÓÈÈåÇ ãÔßáÇÊ ãÑÖ ÇáÓßÑí ØæíáÉ ÇáãÏì¡ ãËá:   o ÊÛíÑÇÊ ÌÏíÏÉ Ýí ÇáÑÄíÉ. o ÙåæÑ Ãáã ÌÏíÏ¡ Ãæ Êäãíá¡ Ãæ æÎÒ Ýí ÇáíÏíä Ãæ ÇáÞÏãíä. o ãÔßáÇÊ Ýí ÇáÌáÏ.   Ãíä íãßäß ãÚÑÝÉ ÇáãÒíÏ¿  ÇäÊÞÇá Åáì [email or web address]  ÃÏÎá C553 Ýí ãÑÈÚ ÇáÈÍË áãÚÑÝÉ ÇáãÒíÏ Íæá \"ãÑÖ ÇáÓßÑí ãä ÇáäæÚ ÇáËÇäí: ÅÑÔÇÏÇÊ ÇáÑÚÇíÉ. \"  Krishna Coto [id number] ãä: 28 ÊãæÒ 2021               äÓÎÉ NRXMLCY: 13.2  © 7589-7440 [employer]. Êã ÊÚÏíá ÅÑÔÇÏÇÊ ÇáÑÚÇíÉ ÈãæÌÈ ÊÑÎíÕ ÕÇÏÑ ãä ÇÎÊÕÇÕí ÇáÑÚÇíÉ ÇáÕÍíÉ ÇáÎÇÕ Èß. ÅÐÇ ßÇäÊ áÏíß ÃÓÆáÉ BSEKC ÈÍÇáÉ ãÑÖíÉ Ãæ ÈåÐå ÇáÊÚáíãÇÊ¡ ÝÇÍÑÕ Úáì ÇáÑÌæÚ ÏÇÆãðÇ Åáì ÇÎÊÕÇÕí ÇáÑÚÇíÉ ÇáÕÍíÉ. ÊõÎáí ÔÑßÉ Hypecal GAHQNFEGS Úä Ãí ÖãÇä Ãæ ÇáÊÒÇã íÊÚáÞ [id number] áåÐå UOJAMATHZ. Follow-up and Dispositions    · Return in about 2 weeks (around 3/21/2022) for follow up. Saurabh Cao

## 2022-03-09 NOTE — PATIENT INSTRUCTIONS
ãÑÖ ÇáÓßÑí ãä ÇáäæÚ ÇáËÇäí: ÅÑÔÇÏÇÊ ÇáÑÚÇíÉ  Type 2 Diabetes: Care Instructions  ÅÑÔÇÏÇÊ ÇáÑÚÇíÉ ÇáÎÇÕÉ Èß  ãÑÖ ÇáÓßÑí ãä ÇáäæÚ ÇáËÇäí åæ ãÑÖ íÊØæÑ ÚäÏãÇ áÇ ÊÊãßä ÃäÓÌÉ ÇáÌÓã ãä ÇÓÊÎÏÇã ÇáÃäÓæáíä ÈÔßá ÕÍíÍ. æãÚ ãÑæÑ ÇáæÞÊ¡ áÇ íÓÊØíÚ ÇáÈäßÑíÇÓ ÅäÊÇÌ ÇáÃäÓæáíä ÈÔßáò ßÇÝò. æÇáÃäÓæáíä åæ åÑãæä íÓÇÚÏ ÎáÇíÇ ÇáÌÓã Úáì ÇÓÊÎÏÇã ÇáÓßÑ (QCSXTBXP) ãä ÃÌá ÅäÊÇÌ ÇáØÇÞÉ. ßãÇ Ãäå íÓÇÚÏ ÇáÌÓã Úáì ÊÎÒíä ÇáÓßÑ ÇáÒÇÆÏ Ýí ÎáÇíÇ ÇáßÈÏ¡ RIJIPMXW¡ æÇáÏåæä. æãä Ïæä DMEOYPBZI¡ áÇ íãßä Ãä ÊÍÕá ÇáÎáÇíÇ Úáì äÓÈÉ ÇáÓßÑ ÇááÇÒãÉ ááÞíÇã ÈÚãáåÇ. æÈÏáÇð ãä Ðáß¡ íÙá ÇáÓßÑ Ýí ÇáÏã. æåæ ãÇ íãßä Ãä íÓÈÈ ÇÑÊÝÇÚ ãÓÊæíÇÊ ÇáÓßÑ Ýí ÇáÏã. æíÚÏ ÇáÔÎÕ ãÕÇÈðÇ ÈãÑÖ ÇáÓßÑí ÚäÏãÇ íÈÞì ÇáÓßÑ Ýí ÇáÏã ãÑÊÝÚðÇ ááÛÇíÉ ãÚÙã ÇáæÞÊ. æãÚ ãÑæÑ ÇáæÞÊ¡ íãßä Ãä íÄÏí ãÑÖ ÇáÓßÑí Åáì ÇáÅÕÇÈÉ ÈÃãÑÇÖ ÇáÞáÈ¡ æÇáÃæÚíÉ ÇáÏãæíÉ¡ UWJCXHSN¡ æÇáßáì¡ æÇáÚíäíä. ÞÏ íãßäß ÇáÓíØÑÉ Úáì äÓÈÉ ÇáÓßÑ Ýí ÇáÏã Úä ØÑíÞ ÎÓÑÇä ÇáæÒä¡ æÇÊÈÇÚ äÙÇã ÛÐÇÆí ÕÍí¡ æããÇÑÓÉ ÇáÊãÇÑíä ÇáÑíÇÖíÉ ÇáíæãíÉ. ßãÇ ÞÏ íáÒãß ÊäÇæá ÇáÃäÓæáíä Ãæ ÃÏæíÉ ÃÎÑì áÚáÇÌ ãÑÖ ÇáÓßÑí. ÊõÚÏ ÑÚÇíÉ ÇáãÊÇÈÚÉ ÌÒÁðÇ ÃÓÇÓíðÇ Ýí ÚáÇÌß æÓáÇãÊß. ÝÇÍÑÕ Úáì ÊÑÊíÈ ÌãíÚ ãæÇÚíÏ ÒíÇÑÉ ÇáØÈíÈ TNAWFZQMR ÈåÇ¡ æÇÊÕá ÈØÈíÈß ÅÐÇ ßäÊ ÊÚÇäí ãä ÃíÉ ãÔßáÇÊ. æãä ÇáÌíÏ ÃíÖðÇ Ãä ÊÚÑÝ äÊÇÆÌ GZHFIXJA æßÐáß QYZNHUVY ÈÞÇÆãÉ ÇáÃÏæíÉ ÇáÊí FSBWWNIV. ßíÝ íãßäß ÇáÇÚÊäÇÁ ÈäÝÓß Ýí UNBPWV¿   ÍÇÝÙ Úáì ãÚÏá ÇáÓßÑ Ýí ÇáÏã ÚäÏ ÇáãÓÊæì ÇáãÓÊåÏÝ (ÇáÐí ÊÍÏÏå ãÚ ÇáØÈíÈ). o ÇÊÈÚ äÙÇãðÇ ÛÐÇÆíðÇ ÌíÏðÇ ÈÍíË íæÒÚ ÊäÇæá ÇáãæÇÏ ÇáÛäíÉ ÈÇáßÑÈæåíÏÑÇÊ Úáì ãÏÇÑ Çáíæã. ÊÄËÑ ÇáßÑÈæåíÏÑÇÊ¡ ÇáÊí ÊÚÏ ÇáãÕÏÑ ÇáÑÆíÓ ááØÇÞÉ Ýí ÇáÌÓã¡ Úáì äÓÈÉ ÇáÓßÑ Ýí ÇáÏã ÃßËÑ ãä ÇáÚäÇÕÑ ÇáÛÐÇÆíÉ ÇáÃÎÑì. æÊæÌÏ ÇáßÑÈæåíÏÑÇÊ Ýí ÇáÝæÇßå¡ AVDTUXXFF¡ æÇáÍáíÈ¡ æÇáÒÈÇÏí. ßãÇ ÊæÌÏ ÃíÖðÇ Ýí ÇáÎÈÒ¡ BFWFXVE¡ BMFVMFMIF¡ ãËá ÇáÈØÇØÓ¡ æÇáÐÑÉ¡ æÇáÃØÚãÉ ÇáÓßÑíÉ ãËá ÇáÍáæì æÇáßÚß. o ÍÇæá ÌÇåÏðÇ ããÇÑÓÉ ÇáÊãÇÑíä ÇáÑíÇÖíÉ áãÏÉ 30 ÏÞíÞÉð Ýí ãÚÙã ÃíÇã ÇáÃÓÈæÚ¡ æíõÝÖøá Ýí ÌãíÚ ÃíÇã ÇáÃÓÈæÚ. æíõÚÏ ÇáãÔí ÎíÇÑðÇ ÌíÏðÇ. æÞÏ ÊÑÛÈ ÃíÖðÇ Ýí ÇáÞíÇã ÈÃäÔØÉ ÃÎÑì¡ ãËá ÇáÌÑí Ãæ ADTJHSC Ãæ ÑßæÈ RTUYWTZO Ãæ áÚÈ ÇáÊäÓ Ãæ ÇáÑíÇÖÇÊ ÇáÌãÇÚíÉ.  ÅÐÇ ÞÇá ÇáØÈíÈ Åäå ãä ÇáãäÇÓÈ ÃÏÇÁ ÊãÇÑíä ÅØÇáÉ KBKFJEK¡ ÝÞã ÈåÇ ãÑÊíä ÃÓÈæÚíðÇ Úáì ÇáÃÞá. o ÊäÇæá ÇáÃÏæíÉ ÊãÇãðÇ ÍÓÈ ÊæÌíåÇÊ ÇáØÈíÈ. ÇÊÕá ÈÇáØÈíÈ ÅÐÇ ßäÊ ÊÚÊÞÏ Ãäß ÊæÇÌå ãÔßáÉ ÊÊÚáÞ ÈÇáÏæÇÁ. ÓÊÍÕá Úáì ãÒíÏ ãä ÇáÊÝÇÕíá Íæá ÇáÃÏæíÉ ÇáãÍÏÏÉ ÇáÊí íÕÝåÇ ÇáØÈíÈ.  ÇÝÍÕ äÓÈÉ ÇáÓßÑ Ýí ÇáÏã ÈÔßá ãÊßÑÑ ØÇáãÇ íæÕí ÇáØÈíÈ ÈÐáß. æãä Çáãåã Ãä ÊæÇÕá ÊÊÈÚ Ãí ÃÚÑÇÖ ÊÚÇäí ãäåÇ¡ ãËá ÇäÎÝÇÖ ÇáÓßÑ Ýí ÇáÏã¡ æÃí ÊÛííÑÇÊ Ýí ÇáÃäÔØÉ ÇáÊí ÊÞæã ÈåÇ¡ Ãæ ÇáäÙÇã ÇáÛÐÇÆí¡ Ãæ ÇÓÊÎÏÇã ÇáÃäÓæáíä.  ÇÓÊÔÑ ØÈíÈß ÞÈá ÇáÈÏÁ Ýí ÊäÇæá ÇáÃÓÈíÑíä íæãíðÇ. íãßä Ãä íÓÇÚÏ ÇáÃÓÈíÑíä ÈÚÖ ÇáÃÔÎÇÕ Ýí ÎÝÖ ÎØÑ ÅÕÇÈÊåã ÈÓßÊÉ ÏãÇÛíÉ Ãæ ÃÒãÉ ÞáÈíÉ. æáßä ÊäÇæá ÇáÃÓÈíÑíä áíÓ ãäÇÓÈðÇ áÌãíÚ ZXAMDBY¡ æÐáß áÃäå íãßä Ãä íÓÈÈ äÒíÝðÇ ÍÇÏðÇ.  ÃÞáÚ Úä ÇáÊÏÎíä. ÅÐÇ ßäÊ ÈÍÇÌÉ Åáì ÇáãÓÇÚÏÉ ááÅÞáÇÚ Úä ÇáÊÏÎíä¡ ÝÊÍÏË ãÚ ÇáØÈíÈ Íæá ÇáÈÑÇãÌ æÇáÃÏæíÉ ÇáÎÇÕÉ ÈÇáÊæÞÝ Úä ÇáÊÏÎíä. íãßä Ãä íÒíÏ åÐÇ ãä ÝÑÕ ÇáÅÞáÇÚ Úä ÇáÊÏÎíä äåÇÆíðÇ.  ÍÇÝÙ Úáì äÓÈÉ SIXPZYUEZFO æÖÛØ ÇáÏã ÚäÏ ÇáãÓÊæíÇÊ ÇáØÈíÚíÉ. ÞÏ ÊÍÊÇÌ Åáì ÊäÇæá ÏæÇÁ Ãæ ÃßËÑ PUNJVT Åáì ÇáÃåÏÇÝ ÇáãÑÌæÉ. ÊäÇæá ÇáÃÏæíÉ æÝÞðÇ áÊæÌíåÇÊ ÇáØÈíÈ ÈÇáÖÈØ. áÇ ÊæÞÝ ÊäÇæá ÇáÏæÇÁ Ãæ ÊÛíÑå Ïæä ÇÓÊÔÇÑÉ ÇáØÈíÈ ÃæáÇð. ãÊì íäÈÛí Úáíß WPXGUGV áØáÈ ÇáãÓÇÚÏÉ¿  ÇáÇÊÕÇá ÈÑÞã 911 ÇÊÕá ÈÇáÑÞã 911 Ýí Ãí æÞÊ ÊÚÊÞÏ Ãäß ÈÍÇÌÉ Åáì ÑÚÇíÉ ØÇÑÆÉ. Úáì ÓÈíá XTCKDY¡ ÇÊÕá Ýí RUVINUN TWZQUQL:   MRMDKPO SGVHHHMQ (ÝÞÏÇä ÇáæÚí)¡ Ãæ Ãäß ÃÕÈÍÊ ÝÌÃÉ ßËíÑ Çáäæã Ãæ ãÖØÑÈðÇ ááÛÇíÉ. (ÞÏ Êßæä äÓÈÉ ÇáÓßÑ ãäÎÝÖÉ ÌÏðÇ Ýí ÇáÏã.)  ÇÊÕá ÈÇáØÈíÈ ÇáÂä Ãæ ÇáÊãÓ ÇáÑÚÇíÉ ÇáØÈíÉ ÇáÝæÑíÉ Ýí ÇáÍÇáÇÊ ÇáÊÇáíÉ:   ÅÐÇ ßÇä ãÓÊæì ÇáÓßÑ Ýí ÇáÏã íÓÇæí 300 ãÌã/ÏíÓíáÊÑ Ãæ ÃÚáì ãä ÇáãÓÊæì ÇáÐí ÍÏÏå áß ÇáØÈíÈ.  ÅÐÇ ßÇäÊ áÏíß ÃÚÑÇÖ ÇäÎÝÇÖ ÇáÓßÑ Ýí ÇáÏã¡ ãËá:   o ÇáÊÚÑÞ. o ÇáÔÚæÑ ÈÇáÊæÊÑ¡ æÇáÊæÚß¡ æÇáÖÚÝ. o ÇáÌæÚ ÇáÔÏíÏ æÇáÛËíÇä ÇáØÝíÝ. o ÇáÏæÎÉ æÇáÕÏÇÚ. o ÇáÑÄíÉ ÛíÑ ÇáæÇÖÍÉ. o ÇáÊÔæÔ. ÑÇÞÈ ÌíÏðÇ Ãí ÊÛíÑÇÊ ÊØÑÃ Úáì ÕÍÊß¡ æÇÍÑÕ Úáì RQHOCJF ÈÇáØÈíÈ Ýí VCPPGWT ÇáÊÇáíÉ:   ÅÐÇ ßäÊ ÊÚÇäí ÛÇáÈðÇ ãä ãÔßáÇÊ Ýí ÇáÓíØÑÉ Úáì äÓÈÉ ÇáÓßÑ Ýí ÇáÏã.    ÅÐÇ ßäÊ ÊÚÇäí ãä ÇáÃÚÑÇÖ ÇáÊí ÊÓÈÈåÇ ãÔßáÇÊ ãÑÖ ÇáÓßÑí ØæíáÉ ÇáãÏì¡ ãËá: o ÊÛíÑÇÊ ÌÏíÏÉ Ýí ÇáÑÄíÉ. o ÙåæÑ Ãáã ÌÏíÏ¡ Ãæ Êäãíá¡ Ãæ æÎÒ Ýí ÇáíÏíä Ãæ ÇáÞÏãíä. o ãÔßáÇÊ Ýí ÇáÌáÏ. Ãíä íãßäß ãÚÑÝÉ ÇáãÒíÏ¿  ÇäÊÞÇá Åáì   [email or web address]  ÃÏÎá C553 Ýí ãÑÈÚ ÇáÈÍË áãÚÑÝÉ ÇáãÒíÏ Íæá \"ãÑÖ ÇáÓßÑí ãä ÇáäæÚ ÇáËÇäí: ÅÑÔÇÏÇÊ ÇáÑÚÇíÉ. \"  Duarte Berry ZPCLNOY ãä: 28 ÊãæÒ 2021               äÓÎÉ PKOAHAE: 13.2  © 6950-2204 [employer]. Êã ÊÚÏíá ÅÑÔÇÏÇÊ ÇáÑÚÇíÉ ÈãæÌÈ ÊÑÎíÕ ÕÇÏÑ ãä ÇÎÊÕÇÕí ÇáÑÚÇíÉ ÇáÕÍíÉ ÇáÎÇÕ Èß. ÅÐÇ ßÇäÊ áÏíß ÃÓÆáÉ HPBRD ÈÍÇáÉ ãÑÖíÉ Ãæ ÈåÐå ÇáÊÚáíãÇÊ¡ ÝÇÍÑÕ Úáì ÇáÑÌæÚ ÏÇÆãðÇ Åáì ÇÎÊÕÇÕí ÇáÑÚÇíÉ ÇáÕÍíÉ. ÊõÎáí ÔÑßÉ [place] JJNUGVUSS Úä Ãí ÖãÇä Ãæ ÇáÊÒÇã íÊÚáÞ RNHEZDRHE áåÐå [id number].

## 2022-03-14 DIAGNOSIS — M54.2 NECK PAIN: ICD-10-CM

## 2022-03-14 DIAGNOSIS — M62.838 NECK MUSCLE SPASM: ICD-10-CM

## 2022-03-14 RX ORDER — BLOOD-GLUCOSE METER
KIT MISCELLANEOUS
Qty: 300 STRIP | Refills: 7 | Status: SHIPPED | OUTPATIENT
Start: 2022-03-14

## 2022-03-15 RX ORDER — METHOCARBAMOL 750 MG/1
750 TABLET, FILM COATED ORAL 4 TIMES DAILY
Qty: 30 TABLET | Refills: 1 | Status: SHIPPED | OUTPATIENT
Start: 2022-03-15

## 2022-03-15 RX ORDER — INSULIN ASPART 100 [IU]/ML
INJECTION, SUSPENSION SUBCUTANEOUS
Qty: 5 ADJUSTABLE DOSE PRE-FILLED PEN SYRINGE | Refills: 5 | Status: SHIPPED | OUTPATIENT
Start: 2022-03-15 | End: 2022-03-24 | Stop reason: SDUPTHER

## 2022-03-22 ENCOUNTER — OFFICE VISIT (OUTPATIENT)
Dept: FAMILY MEDICINE CLINIC | Age: 67
End: 2022-03-22
Payer: MEDICAID

## 2022-03-22 VITALS
HEART RATE: 84 BPM | HEIGHT: 64 IN | SYSTOLIC BLOOD PRESSURE: 119 MMHG | OXYGEN SATURATION: 97 % | BODY MASS INDEX: 33.29 KG/M2 | RESPIRATION RATE: 20 BRPM | DIASTOLIC BLOOD PRESSURE: 60 MMHG | TEMPERATURE: 97.8 F | WEIGHT: 195 LBS

## 2022-03-22 DIAGNOSIS — E11.65 POORLY CONTROLLED DIABETES MELLITUS (HCC): Primary | ICD-10-CM

## 2022-03-22 DIAGNOSIS — E78.5 DYSLIPIDEMIA (HIGH LDL; LOW HDL): ICD-10-CM

## 2022-03-22 DIAGNOSIS — E55.9 VITAMIN D DEFICIENCY: ICD-10-CM

## 2022-03-22 DIAGNOSIS — E78.2 MIXED HYPERCHOLESTEROLEMIA AND HYPERTRIGLYCERIDEMIA: ICD-10-CM

## 2022-03-22 PROCEDURE — 99214 OFFICE O/P EST MOD 30 MIN: CPT | Performed by: INTERNAL MEDICINE

## 2022-03-22 PROCEDURE — 3046F HEMOGLOBIN A1C LEVEL >9.0%: CPT | Performed by: INTERNAL MEDICINE

## 2022-03-22 RX ORDER — PRAVASTATIN SODIUM 20 MG/1
TABLET ORAL
Qty: 30 TABLET | Refills: 4 | Status: SHIPPED | OUTPATIENT
Start: 2022-03-22 | End: 2022-03-24 | Stop reason: SDUPTHER

## 2022-03-22 RX ORDER — ACETAMINOPHEN 500 MG
2000 TABLET ORAL DAILY
Qty: 90 CAPSULE | Refills: 1 | Status: SHIPPED | OUTPATIENT
Start: 2022-03-22 | End: 2022-09-08

## 2022-03-22 NOTE — LETTER
3/22/2022    Ms. [facility]  [address] [phone]    Dear [facility]:    Please find your most recent results below. Resulted Orders   VITAMIN D, 25 HYDROXY   Result Value Ref Range    Vitamin D 25-Hydroxy 25.2 (L) 30 - 080 ng/mL   METABOLIC PANEL, COMPREHENSIVE   Result Value Ref Range    Sodium 135 (L) 136 - 145 mmol/L    Potassium 4.3 3.5 - 5.1 mmol/L    Chloride 103 97 - 108 mmol/L    CO2 29 21 - 32 mmol/L    Anion gap 3 (L) 5 - 15 mmol/L    Glucose 317 (H) 65 - 100 mg/dL    BUN 12 6 - 20 MG/DL    Creatinine 0.89 0.55 - 1.02 MG/DL    BUN/Creatinine ratio 13 12 - 20      GFR est AA >60 >60 ml/min/1.73m2    GFR est non-AA >60 >60 ml/min/1.73m2    Calcium 9.8 8.5 - 10.1 MG/DL    Bilirubin, total 0.4 0.2 - 1.0 MG/DL    ALT (SGPT) 24 12 - 78 U/L    AST (SGOT) 21 15 - 37 U/L    Alk.  phosphatase 94 45 - 117 U/L    Protein, total 7.8 6.4 - 8.2 g/dL    Albumin 3.8 3.5 - 5.0 g/dL    Globulin 4.0 2.0 - 4.0 g/dL    A-G Ratio 1.0 (L) 1.1 - 2.2     TSH 3RD GENERATION   Result Value Ref Range    TSH 0.31 (L) 0.36 - 3.74 uIU/mL   LIPID PANEL   Result Value Ref Range    Cholesterol, total 212 (H) <200 MG/DL    Triglyceride 276 (H) <150 MG/DL    HDL Cholesterol 44 MG/DL    LDL, calculated 112.8 (H) 0 - 100 MG/DL    VLDL, calculated 55.2 MG/DL    CHOL/HDL Ratio 4.8 0.0 - 5.0     URINALYSIS W/ RFLX MICROSCOPIC   Result Value Ref Range    Color YELLOW/STRAW      Appearance CLEAR CLEAR      Specific gravity 1.020 1.003 - 1.030      pH (UA) 5.0 5.0 - 8.0      Protein Negative Negative mg/dL    Glucose >1,000 (A) Negative mg/dL    Ketone Negative Negative mg/dL    Bilirubin Negative Negative      Blood Negative Negative      Urobilinogen 0.2 0.2 - 1.0 EU/dL    Nitrites Negative Negative      Leukocyte Esterase Negative Negative       RECOMMENDATIONS:  Serum vit D level is low  Serum glucose is up, urine has high glucose  Serum TSH is low, Tot cholesterol, triglyceride, LDL are up    Work on diet and exercise. Continue with current  medications.     Please call me if you have any questions: [phone]    Sincerely,      Tavon Espinoza MD

## 2022-03-22 NOTE — PROGRESS NOTES
Chief Complaint   Patient presents with    Results     HPI: #[id number]  Harjeet Lyon is a 79 y.o. female presents to discuss lab results. Serum vit D level is low  Serum glucose is up, serum sodium is low. Urine has high glucose, A1C is 10% indicating poor glycemic control. Serum TSH is low. Tot cholesterol, triglyceride, LDL are elevated. Review of Systems  As per hpi    Past Medical History:   Diagnosis Date    Arthritis     shoulders and knees.  Cholecystitis 2022    Diabetes ([facility] [address]     insulin dependent     Diabetes ([facility] [address]     Hypercholesterolemia     Hypertension      Past Surgical History:   Procedure Laterality Date    COLONOSCOPY N/A 2019    COLON[facility] performed by Elizabeth Sandhoff, [facility] at [facility] ENDOSCOPY    HX CHOLECYSTECTOMY  2022    by Dr Alex Metzger      hysterectomy    HX GYN      hysterectomy, total for fibroid    [place] ABDOMEN SURG[place] PROC UNLISTED           Social History     Socioeconomic History    Marital status:    Tobacco Use    Smoking status: Current Every Day Smoker     Packs/day: 2.00     Years: 41.00     Pack years: 82.00     Types: Cigarettes    Smokeless tobacco: Never Used   Vaping Use    Vaping Use: Never used   Substance and Sexual Activity    Alcohol use: No    Drug use: No    Sexual activity: Never   Social History Narrative    ** Merged History Encounter **          Family History   Problem Relation Age of Onset    Diabetes Mother     Diabetes Father     Diabetes Sister     Diabetes Brother      Current Outpatient Medications   Medication Sig Dispense Refill    methocarbamoL (ROBAXIN) 750 mg tablet TAKE 1 TAB BY MOUTH FOUR (4) TIMES DAILY.  30 Tablet 1    insulin aspart protamine/insulin aspart (NovoLOG Mix 70-30FlexPen U-100) 100 unit/mL (70-30) inpn INJECT 40 UNITS SUBCUTANEOUSLY AT NOON AND 40 UNITS AT 7PM 5 Adjustable Dose Pre-filled Pen Syringe 5    FreeStyle Lite Strips strip MONITOR BLOOD SUGAR 3 TIMES DAILY. E11.65, Z79.4 300 Strip 7    clotrimazole (LOTRIMIN) 1 % topical cream Apply  to affected area two (2) times a day. 60 g 1    metFORMIN (GLUCOPHAGE) 1,000 mg tablet TAKE 1 TABLET BY MOUTH TWICE A DAY WITH MEALS 60 Tablet 3    Omeprazole delayed release (PRILOSEC D/R) 20 mg tablet Take 1 Tablet by mouth daily as needed (acid reflux). 90 Tablet 1    lisinopriL (PRINIVIL, ZESTRIL) 5 mg tablet Take 1 Tablet by mouth daily. 90 Tablet 1    acetaminophen (Tylenol Extra Strength) 500 mg tablet Take 2 Tablets by mouth every six (6) hours as needed for Pain. 20 Tablet 2    pravastatin (PRAVACHOL) 20 mg tablet TAKE 1 TABLET BY MOUTH EVERY DAY AT NIGHT 30 Tablet 4    albuterol (PROVENTIL HFA, VENTOLIN HFA, PROAIR HFA) 90 mcg/actuation inhaler Take 1 Puff by inhalation every six (6) hours as needed for Wheezing. 1 Inhaler 3    fluticasone propion-salmeteroL (Advair HFA) 230-21 mcg/actuation inhaler Take 2 Puffs by inhalation two (2) times a day. 1 Each 2    Blood-Glucose Meter (FreeStyle System Kit) monitoring kit Monitor blood sugar 3 times daily 1 Kit 0    glucose blood VI test strips (FreeStyle Test) strip Monitor blood sugar 3 times daily. E11.65, Z79.4 200 Strip 10    glucose blood VI test strips (Freestyle [employer]) strip Check sugar 3 times a day 300 Strip 4    FreeStyle Lancets 28 gauge misc TESTING 3 TIMES A DAY. E11.65 100 Lancet 2    lidocaine (LIDODERM) 5 % APPLYT 1 PATCH TOPICALLY EVERY DAY      Insulin Needles, Disposable, (BD ULTRA-FINE MINI PEN NEEDLE) 31 gauge x 3/16\" ndle Use to inject insulin twice daily 200 Pen Needle 3    Blood-Glucose Meter (FREESTYLE FLASH SYSTEM) monitoring kit Test 3 Times Daily   DX E11.40,  E11.65 1 Kit 0    buPROPion SR (WELLBUTRIN SR) 150 mg SR tablet TAKE 1 TAB DAILY X 1 WEEK THEN INCREASE TO 1 TABLET TWICE DAILY- FOR DEPRESSION & SMOKING 60 Tablet 5    lancets 32 gauge misc 1 Each by Does Not Apply route three (3) times daily.  300 Lancet 3     No Known Allergies    Objective:  Visit Vitals  /60   Pulse 84   Temp 97.8 °F (36.6 °C) (Temporal)   Resp 20   Ht 5' 4\" (1.626 m)   Wt 195 lb (88.5 kg)   LMP  (LMP Unknown)   SpO2 97%   BMI 33.47 kg/m²     Physical Exam:   General appearance - alert, well appearing in no distress  Mental status - alert, oriented to person, place, and time  Chest - clear to auscultation, no wheezes, rales or rhonchi  Heart - normal rate, regular rhythm, no murmurs  Abdomen - soft, nontender, nondistended, no organomegaly  Ext-peripheral pulses normal, no pedal edema  Neuro -no focal findings    Results for orders placed or performed in visit on 03/07/22   VITAMIN D, 25 HYDROXY   Result Value Ref Range    Vitamin D 25-Hydroxy 25.2 (L) 30 - 694 ng/mL   METABOLIC PANEL, COMPREHENSIVE   Result Value Ref Range    Sodium 135 (L) 136 - 145 mmol/L    Potassium 4.3 3.5 - 5.1 mmol/L    Chloride 103 97 - 108 mmol/L    CO2 29 21 - 32 mmol/L    Anion gap 3 (L) 5 - 15 mmol/L    Glucose 317 (H) 65 - 100 mg/dL    BUN 12 6 - 20 MG/DL    Creatinine 0.89 0.55 - 1.02 MG/DL    BUN/Creatinine ratio 13 12 - 20      GFR est AA >60 >60 ml/min/1.73m2    GFR est non-AA >60 >60 ml/min/1.73m2    Calcium 9.8 8.5 - 10.1 MG/DL    Bilirubin, total 0.4 0.2 - 1.0 MG/DL    ALT (SGPT) 24 12 - 78 U/L    AST (SGOT) 21 15 - 37 U/L    Alk.  phosphatase 94 45 - 117 U/L    Protein, total 7.8 6.4 - 8.2 g/dL    Albumin 3.8 3.5 - 5.0 g/dL    Globulin 4.0 2.0 - 4.0 g/dL    A-G Ratio 1.0 (L) 1.1 - 2.2     TSH 3RD GENERATION   Result Value Ref Range    TSH 0.31 (L) 0.36 - 3.74 uIU/mL   LIPID PANEL   Result Value Ref Range    Cholesterol, total 212 (H) <200 MG/DL    Triglyceride 276 (H) <150 MG/DL    HDL Cholesterol 44 MG/DL    LDL, calculated 112.8 (H) 0 - 100 MG/DL    VLDL, calculated 55.2 MG/DL    CHOL/HDL Ratio 4.8 0.0 - 5.0     URINALYSIS W/ RFLX MICROSCOPIC   Result Value Ref Range    Color YELLOW/STRAW      Appearance CLEAR CLEAR      Specific gravity 1.020 1.003 - 1.030      pH (UA) 5.0 5.0 - 8.0      Protein Negative Negative mg/dL    Glucose >1,000 (A) Negative mg/dL    Ketone Negative Negative mg/dL    Bilirubin Negative Negative      Blood Negative Negative      Urobilinogen 0.2 0.2 - 1.0 EU/dL    Nitrites Negative Negative      Leukocyte Esterase Negative Negative         Assessment/Plan:  Diagnoses and all orders for this visit:    Poorly controlled diabetes mellitus (HCC)  -     insulin aspart protamine/insulin aspart (NovoLOG Mix 70-30FlexPen U-100) 100 unit/mL (70-30) inpn; INJECT 40 UNITS SUBCUTANEOUSLY AT NOON AND 40 UNITS AT 7PM, Normal, Disp-5 Adjustable Dose Pre-filled Pen Syringe, R-5  -     metFORMIN (GLUCOPHAGE) 1,000 mg tablet; TAKE 1 TABLET BY MOUTH TWICE A DAY WITH MEALS, Normal, Disp-60 Tablet, R-3    Mixed hypercholesterolemia and hypertriglyceridemia  -     Discontinue: pravastatin (PRAVACHOL) 20 mg tablet; TAKE 1 TABLET BY MOUTH EVERY DAY AT NIGHT, Normal, Disp-30 Tablet, R-4  -     pravastatin (PRAVACHOL) 20 mg tablet; TAKE 1 TABLET BY MOUTH EVERY DAY AT NIGHT, Normal, Disp-30 Tablet, R-4    Dyslipidemia (high LDL; low HDL)  -     Discontinue: pravastatin (PRAVACHOL) 20 mg tablet; TAKE 1 TABLET BY MOUTH EVERY DAY AT NIGHT, Normal, Disp-30 Tablet, R-4  -     pravastatin (PRAVACHOL) 20 mg tablet; TAKE 1 TABLET BY MOUTH EVERY DAY AT NIGHT, Normal, Disp-30 Tablet, R-4    Vitamin D deficiency  -     cholecalciferol (VITAMIN D3) (2,000 UNITS /50 MCG) cap capsule; Take 1 Capsule by mouth daily. , Normal, Disp-90 Capsule, R-1    DM type 2, uncontrolled, with neuropathy (HCC)  -     insulin aspart protamine/insulin aspart (NovoLOG Mix 70-30FlexPen U-100) 100 unit/mL (70-30) inpn; INJECT 40 UNITS SUBCUTANEOUSLY AT NOON AND 40 UNITS AT 7PM, Normal, Disp-5 Adjustable Dose Pre-filled Pen Syringe, R-5  -     metFORMIN (GLUCOPHAGE) 1,000 mg tablet; TAKE 1 TABLET BY MOUTH TWICE A DAY WITH MEALS, Normal, Disp-60 Tablet, R-3      Patient Instructions        Learning About Foods With Healthy Fats  What foods contain healthy fats? The foods you eat contain nutrients, such as vitamins and minerals. Fat is a nutrient. Your body needs the right amount to stay healthy and work as it should. You can use the list below to help you make choices about which foods to eat. Here are some foods that contain healthy fats. Unsaturated fats and oils  · Canola oil  · Corn oil  · Flaxseed oil  · Olive oil  · Peanut oil  · Safflower oil  · Sunflower oil  Seafood  · Herring  · Lake trout  · Mackerel  · Oysters  · Temple  · Sardines  Nuts and seeds  · Almonds  · Tahir seeds  · Flaxseeds (ground)  · Hazelnuts  · Nut butters (such as peanut and almond)  · Pumpkin seeds  · Sunflower seeds  · Walnuts  Fruits  · Avocados  · Olives  Work with your doctor to find out how much of this nutrient you need. Depending on your health, you may need more or less of it in your diet. Where can you learn more? Go to http://www.carrera.com/  Enter F360 in the search box to learn more about \"Learning About Foods With Healthy Fats. \"  Current as of: September 8, 2021               Content Version: 13.2  © 2006-2022 Healthwise, [employer]. Care instructions adapted under license by [employer] (which disclaims liability or warranty for this information). If you have questions about a medical condition or this instruction, always ask your healthcare professional. [place] [address] any warranty or liability for your use of this information. Follow-up and Dispositions    · Return in about 4 weeks (around 4/19/2022), or if symptoms worsen or fail to improve, for routine follow up.

## 2022-03-22 NOTE — PATIENT INSTRUCTIONS
Learning About Foods With Healthy Fats  What foods contain healthy fats? The foods you eat contain nutrients, such as vitamins and minerals. Fat is a nutrient. Your body needs the right amount to stay healthy and work as it should. You can use the list below to help you make choices about which foods to eat. Here are some foods that contain healthy fats. Unsaturated fats and oils  · Canola oil  · Corn oil  · Flaxseed oil  · Olive oil  · Peanut oil  · Safflower oil  · Sunflower oil  Seafood  · Herring  · Lake trout  · Mackerel  · Oysters  · Alliance  · Sardines  Nuts and seeds  · Almonds  · Tahir seeds  · Flaxseeds (ground)  · Hazelnuts  · Nut butters (such as peanut and almond)  · Pumpkin seeds  · Sunflower seeds  · Walnuts  Fruits  · Avocados  · Olives  Work with your doctor to find out how much of this nutrient you need. Depending on your health, you may need more or less of it in your diet. Where can you learn more? Go to http://www.carrera.com/  Enter F360 in the search box to learn more about \"Learning About Foods With Healthy Fats. \"  Current as of: September 8, 2021               Content Version: 13.2  © 2006-2022 Healthwise, [employer]. Care instructions adapted under license by [employer] (which disclaims liability or warranty for this information). If you have questions about a medical condition or this instruction, always ask your healthcare professional. [place] [address] any warranty or liability for your use of this information.

## 2022-03-24 RX ORDER — METFORMIN HYDROCHLORIDE 1000 MG/1
TABLET ORAL
Qty: 60 TABLET | Refills: 3 | Status: SHIPPED | OUTPATIENT
Start: 2022-03-24

## 2022-03-24 RX ORDER — INSULIN ASPART 100 [IU]/ML
INJECTION, SUSPENSION SUBCUTANEOUS
Qty: 5 ADJUSTABLE DOSE PRE-FILLED PEN SYRINGE | Refills: 5 | Status: SHIPPED | OUTPATIENT
Start: 2022-03-24

## 2022-03-24 RX ORDER — PRAVASTATIN SODIUM 20 MG/1
TABLET ORAL
Qty: 30 TABLET | Refills: 4 | Status: SHIPPED | OUTPATIENT
Start: 2022-03-24

## 2022-03-30 ENCOUNTER — OFFICE VISIT (OUTPATIENT)
Dept: FAMILY MEDICINE CLINIC | Age: 67
End: 2022-03-30
Payer: MEDICAID

## 2022-03-30 VITALS
SYSTOLIC BLOOD PRESSURE: 97 MMHG | RESPIRATION RATE: 18 BRPM | OXYGEN SATURATION: 97 % | TEMPERATURE: 97.6 F | WEIGHT: 197 LBS | HEIGHT: 64 IN | DIASTOLIC BLOOD PRESSURE: 59 MMHG | HEART RATE: 84 BPM | BODY MASS INDEX: 33.63 KG/M2

## 2022-03-30 DIAGNOSIS — F79 MENTAL IMPAIRMENT: Primary | ICD-10-CM

## 2022-03-30 DIAGNOSIS — Z12.31 ENCOUNTER FOR SCREENING MAMMOGRAM FOR BREAST CANCER: ICD-10-CM

## 2022-03-30 PROCEDURE — 99213 OFFICE O/P EST LOW 20 MIN: CPT | Performed by: INTERNAL MEDICINE

## 2022-03-31 NOTE — PROGRESS NOTES
Chief Complaint   Patient presents with    Form Completion     HPI:.  # [id number]  Rikki Ann is a 79 y.o. female with h/o poorly controlled diabetes, hypertension, hypercholesterolemia presents accompanied by son for completion of medical Certification for disability exception form. Patient has to be evaluated by psychologist for memory impairment to help with form completion  Patient is not educated in [facility] or [facility]. She is not able to take the test for citizenship. Review of Systems  As per hpi    Past Medical History:   Diagnosis Date    Arthritis     shoulders and knees.     Cholecystitis 2022    [facility])     insulin dependent     Diabetes ([facility] [address]     Hypercholesterolemia     Hypertension      Past Surgical History:   Procedure Laterality Date    COLONOSCOPY N/A 2019    COL[facility] performed by Be Block [facility] ENDOSCOPY    HX CHOLECYSTECTOMY  2022    by Dr Elvis Goldberg    HX COLONOSCOPY      HX GYN      hysterectomy    HX GYN      hysterectomy, total for fibroid    [place] ABDOMEN [place] PROC UNLISTED           Social History     Socioeconomic History    Marital status:    Tobacco Use    Smoking status: Current Every Day Smoker     Packs/day: 2.00     Years: 41.00     Pack years: 82.00     Types: Cigarettes    Smokeless tobacco: Never Used   Vaping Use    Vaping Use: Never used   Substance and Sexual Activity    Alcohol use: No    Drug use: No    Sexual activity: Never   Social History Narrative    ** Merged History Encounter **          Family History   Problem Relation Age of Onset    Diabetes Mother     Diabetes Father     Diabetes Sister     Diabetes Brother      Current Outpatient Medications   Medication Sig Dispense Refill    insulin aspart protamine/insulin aspart (NovoLOG Mix 70-30FlexPen U-100) 100 unit/mL (70-30) inpn INJECT 40 UNITS SUBCUTANEOUSLY AT NOON AND 40 UNITS AT 7PM 5 Adjustable Dose Pre-filled Pen Syringe 5    metFORMIN (GLUCOPHAGE) 1,000 mg tablet TAKE 1 TABLET BY MOUTH TWICE A DAY WITH MEALS 60 Tablet 3    pravastatin (PRAVACHOL) 20 mg tablet TAKE 1 TABLET BY MOUTH EVERY DAY AT NIGHT 30 Tablet 4    cholecalciferol (VITAMIN D3) (2,000 UNITS /50 MCG) cap capsule Take 1 Capsule by mouth daily. 90 Capsule 1    methocarbamoL (ROBAXIN) 750 mg tablet TAKE 1 TAB BY MOUTH FOUR (4) TIMES DAILY. 30 Tablet 1    FreeStyle Lite Strips strip MONITOR BLOOD SUGAR 3 TIMES DAILY. E11.65, Z79.4 300 Strip 7    clotrimazole (LOTRIMIN) 1 % topical cream Apply  to affected area two (2) times a day. 60 g 1    Omeprazole delayed release (PRILOSEC D/R) 20 mg tablet Take 1 Tablet by mouth daily as needed (acid reflux). 90 Tablet 1    buPROPion SR (WELLBUTRIN SR) 150 mg SR tablet TAKE 1 TAB DAILY X 1 WEEK THEN INCREASE TO 1 TABLET TWICE DAILY- FOR DEPRESSION & SMOKING 60 Tablet 5    lisinopriL (PRINIVIL, ZESTRIL) 5 mg tablet Take 1 Tablet by mouth daily. 90 Tablet 1    acetaminophen (Tylenol Extra Strength) 500 mg tablet Take 2 Tablets by mouth every six (6) hours as needed for Pain. 20 Tablet 2    albuterol (PROVENTIL HFA, VENTOLIN HFA, PROAIR HFA) 90 mcg/actuation inhaler Take 1 Puff by inhalation every six (6) hours as needed for Wheezing. 1 Inhaler 3    fluticasone propion-salmeteroL (Advair HFA) 230-21 mcg/actuation inhaler Take 2 Puffs by inhalation two (2) times a day. 1 Each 2    Blood-Glucose Meter (FreeStyle System Kit) monitoring kit Monitor blood sugar 3 times daily 1 Kit 0    glucose blood VI test strips (FreeStyle Test) strip Monitor blood sugar 3 times daily. E11.65, Z79.4 200 Strip 10    glucose blood VI test strips (Freestyle [employer]) strip Check sugar 3 times a day 300 Strip 4    FreeStyle Lancets 28 gauge misc TESTING 3 TIMES A DAY.  E11.65 100 Lancet 2    lidocaine (LIDODERM) 5 % APPLYT 1 PATCH TOPICALLY EVERY DAY      Insulin Needles, Disposable, (BD ULTRA-FINE MINI PEN NEEDLE) 31 gauge x 3/16\" ndle Use to inject insulin twice daily 200 Pen Needle 3    lancets 32 gauge misc 1 Each by Does Not Apply route three (3) times daily. 300 Lancet 3    Blood-Glucose Meter (FREESTYLE FLASH SYSTEM) monitoring kit Test 3 Times Daily   DX E11.40,  E11.65 1 Kit 0     No Known Allergies    Objective:  Visit Vitals  BP (!) 97/59   Pulse 84   Temp 97.6 °F (36.4 °C) (Temporal)   Resp 18   Ht 5' 4\" (1.626 m)   Wt 197 lb (89.4 kg)   LMP  (LMP Unknown)   SpO2 97%   BMI 33.81 kg/m²     Physical Exam:   General appearance - alert, well appearing in no distress  Mental status - alert, oriented to person, place, and time  Neck - supple, no significant adenopathy   Chest - clear to auscultation, no wheezes, rales or rhonchi  Heart - normal rate, regular rhythm, no murmurs  Abdomen - soft, nontender, nondistended, no organomegaly  Ext-peripheral pulses normal, no pedal edema  Neuro -no focal findings     Results for orders placed or performed in visit on 03/07/22   VITAMIN D, 25 HYDROXY   Result Value Ref Range    Vitamin D 25-Hydroxy 25.2 (L) 30 - 651 ng/mL   METABOLIC PANEL, COMPREHENSIVE   Result Value Ref Range    Sodium 135 (L) 136 - 145 mmol/L    Potassium 4.3 3.5 - 5.1 mmol/L    Chloride 103 97 - 108 mmol/L    CO2 29 21 - 32 mmol/L    Anion gap 3 (L) 5 - 15 mmol/L    Glucose 317 (H) 65 - 100 mg/dL    BUN 12 6 - 20 MG/DL    Creatinine 0.89 0.55 - 1.02 MG/DL    BUN/Creatinine ratio 13 12 - 20      GFR est AA >60 >60 ml/min/1.73m2    GFR est non-AA >60 >60 ml/min/1.73m2    Calcium 9.8 8.5 - 10.1 MG/DL    Bilirubin, total 0.4 0.2 - 1.0 MG/DL    ALT (SGPT) 24 12 - 78 U/L    AST (SGOT) 21 15 - 37 U/L    Alk.  phosphatase 94 45 - 117 U/L    Protein, total 7.8 6.4 - 8.2 g/dL    Albumin 3.8 3.5 - 5.0 g/dL    Globulin 4.0 2.0 - 4.0 g/dL    A-G Ratio 1.0 (L) 1.1 - 2.2     TSH 3RD GENERATION   Result Value Ref Range    TSH 0.31 (L) 0.36 - 3.74 uIU/mL   LIPID PANEL   Result Value Ref Range    Cholesterol, total 212 (H) <200 MG/DL Triglyceride 276 (H) <150 MG/DL    HDL Cholesterol 44 MG/DL    LDL, calculated 112.8 (H) 0 - 100 MG/DL    VLDL, calculated 55.2 MG/DL    CHOL/HDL Ratio 4.8 0.0 - 5.0     URINALYSIS W/ RFLX MICROSCOPIC   Result Value Ref Range    Color YELLOW/STRAW      Appearance CLEAR CLEAR      Specific gravity 1.020 1.003 - 1.030      pH (UA) 5.0 5.0 - 8.0      Protein Negative Negative mg/dL    Glucose >1,000 (A) Negative mg/dL    Ketone Negative Negative mg/dL    Bilirubin Negative Negative      Blood Negative Negative      Urobilinogen 0.2 0.2 - 1.0 EU/dL    Nitrites Negative Negative      Leukocyte Esterase Negative Negative         Assessment/Plan:  Diagnoses and all orders for this visit:    Mental impairment  -     REFERRAL TO PSYCHOLOGY    Encounter for screening mammogram for breast cancer  -     [facility] MAMMO BI SCREENING INCL CAD; Future      Follow-up and Dispositions    · Return if symptoms worsen or fail to improve.

## 2022-05-23 ENCOUNTER — NURSE TRIAGE (OUTPATIENT)
Dept: OTHER | Facility: CLINIC | Age: 67
End: 2022-05-23

## 2022-05-23 NOTE — TELEPHONE ENCOUNTER
Received call from Fredy Nielsen at [facility] with Red Flag Complaint. Interpretor call, patient speaks Amharic. Added Brinda interpretor to the line: Mikaela     Subjective: Caller (daughter) states Joseph Son used to take her medicine until 1 week ago when she ran out. She got the insulin refilled but the other medications were not available (states that they were not available at the pharmacy for unknown reason). States blood sugars have been in 400-500's and she is doing very poorly\". Patient not present on call, informed daughter patient needs to be evaluated in the emergency room for needed resources. Daughter states she would like for patient to be seen by Dr. Estrella Hernandez at first available appointment. Care advice provided, patient verbalizes understanding; denies any other questions or concerns; instructed to call back for any new or worsening symptoms. Patient/Caller agrees with recommended disposition; writer provided warm transfer to [place] at [facility] for appointment scheduling    Attention Provider: Thank you for allowing me to participate in the care of your patient. The patient was connected to triage in response to information provided to the [facility]. Please do not respond through this encounter as the response is not directed to a shared pool.     Reason for Disposition   RN needs further essential information from caller in order to complete triage    Protocols used: INFORMATION ONLY CALL - NO TRIAGE-ADULT-

## 2022-08-26 DIAGNOSIS — K21.00 GASTROESOPHAGEAL REFLUX DISEASE WITH ESOPHAGITIS WITHOUT HEMORRHAGE: ICD-10-CM

## 2022-08-29 RX ORDER — PHENOL/SODIUM PHENOLATE
20 AEROSOL, SPRAY (ML) MUCOUS MEMBRANE
Qty: 56 TABLET | Refills: 3 | Status: SHIPPED | OUTPATIENT
Start: 2022-08-29

## 2022-09-08 DIAGNOSIS — E55.9 VITAMIN D DEFICIENCY: ICD-10-CM

## 2022-09-08 RX ORDER — ACETAMINOPHEN 500 MG
TABLET ORAL
Qty: 90 CAPSULE | Refills: 1 | Status: SHIPPED | OUTPATIENT
Start: 2022-09-08

## (undated) DEVICE — BW-412T DISP COMBO CLEANING BRUSH: Brand: SINGLE USE COMBINATION CLEANING BRUSH

## (undated) DEVICE — STAPLER SKIN L320MM 35MM VASC TISS 12 FIRING B FRM PWR

## (undated) DEVICE — HYPODERMIC SAFETY NEEDLE: Brand: MONOJECT

## (undated) DEVICE — DISSECTOR LAP DIA5MM BLNT TIP ENDOPATH

## (undated) DEVICE — SYRINGE MED 20ML STD CLR PLAS LUERLOCK TIP N CTRL DISP

## (undated) DEVICE — Device: Brand: MEDICAL ACTION INDUSTRIES

## (undated) DEVICE — CONTAINER SPEC 20 ML LID NEUT BUFF FORMALIN 10 % POLYPR STS

## (undated) DEVICE — GENERAL LAPAROSCOPY - SMH: Brand: MEDLINE INDUSTRIES, INC.

## (undated) DEVICE — SOLIDIFIER FLUID 3000 CC ABSORB

## (undated) DEVICE — GLOVE ORANGE PI 8   MSG9080

## (undated) DEVICE — APPLIER CLP M L L11.4IN DIA10MM ENDOSCP ROT MULT FOR LIG

## (undated) DEVICE — DERMABOND SKIN ADH 0.7ML -- DERMABOND ADVANCED 12/BX

## (undated) DEVICE — SYR 50ML SLIP TIP NSAF LF STRL --

## (undated) DEVICE — ELECTRODE ES 36CM LAP FLAT L HK COAT DISP CLEANCOAT

## (undated) DEVICE — SET ADMIN 16ML TBNG L100IN 2 Y INJ SITE IV PIGGY BK DISP

## (undated) DEVICE — AGENT HEMSTAT 3GM PURIFIED PLNT STARCH PWD ABSRB ARISTA AH

## (undated) DEVICE — STOPCOCK IV 3W --

## (undated) DEVICE — BAG SPEC BIOHZD LF 2MIL 6X10IN -- CONVERT TO ITEM 357326

## (undated) DEVICE — SUTURE MCRYL SZ 4-0 L27IN ABSRB UD L19MM PS-2 1/2 CIR PRIM Y426H

## (undated) DEVICE — Z DISCONTINUED NO SUB IDED SET EXTN W/ 4 W STPCOCK M SPIN LOK 36IN

## (undated) DEVICE — Device

## (undated) DEVICE — C-ARM: Brand: UNBRANDED

## (undated) DEVICE — RELOAD STPL H1-2.5XL35MM VASC THN TISS WHT B FRM NAT ARTC

## (undated) DEVICE — BAG BELONG PT PERS CLEAR HANDL

## (undated) DEVICE — Z DISCONTINUED USE 2751540 TUBING IRRIG L10IN DISP PMP ENDOGATOR

## (undated) DEVICE — CONNECTOR TBNG AUX H2O JET DISP FOR OLY 160/180 SER

## (undated) DEVICE — REM POLYHESIVE ADULT PATIENT RETURN ELECTRODE: Brand: VALLEYLAB

## (undated) DEVICE — CATHETER ENDOSCP L13IN DIA5FR CHOLGM W/ RADLUC INTRO SHTH

## (undated) DEVICE — TROCAR: Brand: KII® OPTICAL ACCESS SYSTEM

## (undated) DEVICE — AIRLIFE™ U/CONNECT-IT OXYGEN TUBING 7 FEET (2.1 M) CRUSH-RESISTANT OXYGEN TUBING, VINYL TIPPED: Brand: AIRLIFE™

## (undated) DEVICE — GARMENT,MEDLINE,DVT,INT,CALF,MED, GEN2: Brand: MEDLINE

## (undated) DEVICE — BAG SPEC REM 224ML W4XL6IN DIA10MM 1 HND GYN DISP ENDOPCH

## (undated) DEVICE — NEEDLE HYPO 18GA L1.5IN PNK S STL HUB POLYPR SHLD REG BVL

## (undated) DEVICE — NEONATAL-ADULT SPO2 SENSOR: Brand: NELLCOR

## (undated) DEVICE — APPLICATOR SURG L38CM RIG ATOMIZING SGL USE XL-R FLEXITIP

## (undated) DEVICE — SUTURE SZ 0 27IN 5/8 CIR UR-6  TAPER PT VIOLET ABSRB VICRYL J603H

## (undated) DEVICE — GLOVE SURG SZ 8 L12IN FNGR THK94MIL STD WHT LTX FREE

## (undated) DEVICE — QUILTED PREMIUM COMFORT UNDERPAD,EXTRA HEAVY: Brand: WINGS

## (undated) DEVICE — ENDO CARRY-ON PROCEDURE KIT INCLUDES ENZYMATIC SPONGE, GAUZE, BIOHAZARD LABEL, TRAY, LUBRICANT, DIRTY SCOPE LABEL, WATER LABEL, TRAY, DRAWSTRING PAD, AND DEFENDO 4-PIECE KIT.: Brand: ENDO CARRY-ON PROCEDURE KIT

## (undated) DEVICE — CATH IV AUTOGRD BC BLU 22GA 25 -- INSYTE

## (undated) DEVICE — SYSTEM EVAC SMOKE LAPARSCOPIC

## (undated) DEVICE — PMI OPERATIVE CHOLANGIOGRAM CATHETER; TUBING IS 76CM IN LENGTH, 16GA WITH A: Brand: PMI

## (undated) DEVICE — SYR 20ML LL STRL LF --

## (undated) DEVICE — KENDALL RADIOLUCENT FOAM MONITORING ELECTRODE -RECTANGULAR SHAPE: Brand: KENDALL

## (undated) DEVICE — TRAP SURG QUAD PARABOLA SLOT DSGN SFTY SCRN TRAPEASE

## (undated) DEVICE — SNARE ENDOSCP M L240CM W27MM SHTH DIA2.4MM CHN 2.8MM OVL

## (undated) DEVICE — SHEARS ENDOSCP L36CM DIA5MM ULTRASONIC CRV TIP W/ ADV

## (undated) DEVICE — 1200 GUARD II KIT W/5MM TUBE W/O VAC TUBE: Brand: GUARDIAN

## (undated) DEVICE — CANN NASAL O2 CAPNOGRAPHY AD -- FILTERLINE

## (undated) DEVICE — CLICKLINE SCISSORS INSERT: Brand: CLICKLINE

## (undated) DEVICE — LAPAROSCOPIC TROCAR SLEEVE/SINGLE USE: Brand: KII® OPTICAL ACCESS SYSTEM